# Patient Record
Sex: MALE | Race: WHITE | NOT HISPANIC OR LATINO | Employment: OTHER | URBAN - METROPOLITAN AREA
[De-identification: names, ages, dates, MRNs, and addresses within clinical notes are randomized per-mention and may not be internally consistent; named-entity substitution may affect disease eponyms.]

---

## 2017-02-22 ENCOUNTER — GENERIC CONVERSION - ENCOUNTER (OUTPATIENT)
Dept: OTHER | Facility: OTHER | Age: 67
End: 2017-02-22

## 2017-03-28 ENCOUNTER — HOSPITAL ENCOUNTER (OUTPATIENT)
Dept: RADIOLOGY | Facility: CLINIC | Age: 67
Discharge: HOME | End: 2017-03-28
Payer: COMMERCIAL

## 2017-03-28 ENCOUNTER — TRANSCRIBE ORDERS (OUTPATIENT)
Dept: RADIOLOGY | Facility: CLINIC | Age: 67
End: 2017-03-28

## 2017-03-28 DIAGNOSIS — C43.9 MALIGNANT MELANOMA OF SKIN (HCC): ICD-10-CM

## 2017-03-28 PROCEDURE — 71020 HB CHEST X-RAY 2VW FRONTAL&LATL: CPT

## 2017-04-06 ENCOUNTER — ALLSCRIPTS OFFICE VISIT (OUTPATIENT)
Dept: OTHER | Facility: OTHER | Age: 67
End: 2017-04-06

## 2017-08-02 ENCOUNTER — GENERIC CONVERSION - ENCOUNTER (OUTPATIENT)
Dept: OTHER | Facility: OTHER | Age: 67
End: 2017-08-02

## 2017-08-10 LAB
A/G RATIO (HISTORICAL): 1.7 (ref 1.2–2.2)
ALBUMIN SERPL BCP-MCNC: 4.2 G/DL (ref 3.6–4.8)
ALP SERPL-CCNC: 67 IU/L (ref 39–117)
ALT SERPL W P-5'-P-CCNC: 25 IU/L (ref 0–44)
AST SERPL W P-5'-P-CCNC: 22 IU/L (ref 0–40)
BILIRUB SERPL-MCNC: 0.8 MG/DL (ref 0–1.2)
BUN SERPL-MCNC: 20 MG/DL (ref 8–27)
BUN/CREA RATIO (HISTORICAL): 21 (ref 10–24)
CALCIUM SERPL-MCNC: 9.2 MG/DL (ref 8.6–10.2)
CHLORIDE SERPL-SCNC: 100 MMOL/L (ref 96–106)
CHOLEST SERPL-MCNC: 126 MG/DL (ref 100–199)
CO2 SERPL-SCNC: 24 MMOL/L (ref 18–29)
CREAT SERPL-MCNC: 0.97 MG/DL (ref 0.76–1.27)
EGFR AFRICAN AMERICAN (HISTORICAL): 92 ML/MIN/1.73
EGFR-AMERICAN CALC (HISTORICAL): 79 ML/MIN/1.73
GLUCOSE SERPL-MCNC: 89 MG/DL (ref 65–99)
HDLC SERPL-MCNC: 39 MG/DL
LDLC SERPL CALC-MCNC: 54 MG/DL (ref 0–99)
POTASSIUM SERPL-SCNC: 4.5 MMOL/L (ref 3.5–5.2)
PROSTATE SPECIFIC ANTIGEN (HISTORICAL): 1.9 NG/ML (ref 0–4)
SODIUM SERPL-SCNC: 140 MMOL/L (ref 134–144)
TOT. GLOBULIN, SERUM (HISTORICAL): 2.5 G/DL (ref 1.5–4.5)
TOTAL PROTEIN (HISTORICAL): 6.7 G/DL (ref 6–8.5)
TRIGL SERPL-MCNC: 163 MG/DL (ref 0–149)

## 2017-08-11 ENCOUNTER — GENERIC CONVERSION - ENCOUNTER (OUTPATIENT)
Dept: OTHER | Facility: OTHER | Age: 67
End: 2017-08-11

## 2017-08-16 ENCOUNTER — ALLSCRIPTS OFFICE VISIT (OUTPATIENT)
Dept: OTHER | Facility: OTHER | Age: 67
End: 2017-08-16

## 2017-09-06 ENCOUNTER — ALLSCRIPTS OFFICE VISIT (OUTPATIENT)
Dept: OTHER | Facility: OTHER | Age: 67
End: 2017-09-06

## 2017-10-13 NOTE — H&P
Assessment  1  Basal cell carcinoma of skin of face (173 31) (C44 310)     Discussion/Summary  Discussion Summary:   Please see HPI  We discussed excision of the basal cell carcinoma, frozen section, and reconstruction  We discussed the procedure, how it is performed, as well as potential risks, complications and limitations  His questions have been answered to his satisfaction  Consent obtained, and we will work on scheduling a date for the procedure  The final pathology will need to be forwarded to Dr Esteban Ramos  Counseling Documentation With Imm: The patient was counseled regarding diagnostic results,-prognosis,-patient and family education,-impressions,-risks and benefits of treatment options  total time of encounter was 25 [id number] minutes was spent counseling  Chief Complaint  Chief Complaint Free Text Note Form: Basal cell carcinoma left cheek      History of Present Illness  HPI: Alyssa Blair has been referred back by Dr Esteban Ramos, recent left cheek biopsy revealed basal cell carcinoma  Review of Systems  Complete-Male:   Constitutional: no fever-and-no chills  Eyes: eyesight problems-and-Wears reading glasses  ENT: no hearing loss  Cardiovascular: no chest pain-and-no extremity edema  Respiratory: no shortness of breath  Gastrointestinal: no nausea,-no vomiting,-no constipation,-no diarrhea-and-no blood in stools  Genitourinary: no dysuria-and-no incontinence  Musculoskeletal: no limb swelling  Integumentary: no rashes-and-no skin wound  Neurological: no headache,-no confusion-and-no convulsions  Psychiatric: no anxiety-and-no depression  Endocrine: no proptosis  Hematologic/Lymphatic: no tendency for easy bleeding-and-no tendency for easy bruising  Active Problems  1  Acute UTI (599 0) (N39 0)   2  Afib (427 31) (I48 91)   3  Benign neoplasm of skin of right lower extremity (216 7) (D23 71)   4  Encounter for prostate cancer screening (V76 44) (Z12 5)   5   History of malignant melanoma of skin (V10 82) (Z85 820)   6  Hypertension (401 9) (I10)   7  Insomnia (780 52) (G47 00)   8  Malignant melanoma of skin (172 9) (C43 9)   9  Mixed hyperlipidemia (272 2) (E78 2)   10  Subcutaneous mass (782 2) (R22 9)     Past Medical History  1  History of Cataract of right eye (366 9) (H26 9)   2  History of colonic polyps (V12 72) (Z86 010)   3  History of hypertension (V12 59) (Z86 79)   4  History of malignant melanoma of skin (V10 82) (Z85 820)   5  History of mitral valve disorder (V12 59) (Z86 79)   6  History of retinal detachment (V12 49) (Z86 69)   7  History of Reported A Previous Heart Murmur     Surgical History  1  History of Appendectomy   2  History of Biopsy Lymph Node   3  History of Cataract Extraction   4  History of Dermal Autograft Scalp   5  History of Excision Of Lesion Scalp Malignant Over 4cm   6  History of Mitral Valve Repair   7  History of Repair Of Retinal Detachment   8  History of Tonsillectomy  Surgical History Reviewed: The surgical history was reviewed and updated today  Family History  Mother    1  Family history of Basal Cell Carcinoma Of The Skin   2  Family history of Benign Polyps Of The Large Intestine (V18 51)   3  Family history of Colon Cancer (V16 0)   4  Family history of Hypertension (V17 49)  Family History    5  Family history of Colon carcinoma  Family History Reviewed: The family history was reviewed and updated today  Social History   · Being A Social Drinker   · Marital History - Currently    · Never A Smoker   · Retired From Work   · Denied: History of Tobacco Use  Social History Reviewed: The social history was reviewed and updated today  The social history was reviewed and is unchanged  Current Meds   1  Aspirin  MG Oral Tablet Delayed Release; Therapy: (Recorded:18Jun2012) to Recorded   2  Atenolol 50 MG Oral Tablet; Therapy: [id number] to Recorded   3   Chlorthalidone 25 MG Oral Tablet; TAKE 1/2 TABLET DAILY; Therapy: 85DXB4347 to Recorded   4  Multivital Platinum Silver CHEW;   Therapy: [id number] to (Last Rx:12Mar2012)  Requested for: 12Mar2012 Ordered   5  Pravastatin Sodium 40 MG Oral Tablet; Take 1 tablet daily; Therapy: 96GFF1447 to  Requested for: 16Aug2017 Recorded   6  Zolpidem Tartrate 5 MG Oral Tablet; TAKE 1 TABLET AT BEDTIME AS NEEDED FOR   SLEEP; Therapy: 11Aug2016 to (Evaluate:03Pnr7187); Last Rx:[id number] Ordered  Medication List Reviewed: The medication list was reviewed and updated today  Allergies  1  No Known Drug Allergies     Vitals  Vital Signs     Recorded: 08NGL3264 10:16AM   BP Comments unobtainable   Height 5 ft 11 5 in   Weight 211 lb 6 oz   BMI Calculated 29 07   BSA Calculated 2 17      Physical Exam     Constitutional   General appearance: No acute distress, well appearing and well nourished  Eyes   Conjunctiva and lids: No swelling, erythema, or discharge  Pupils and irises: Equal, round and reactive to light  Ears, Nose, Mouth, and Throat   External inspection of ears and nose: Normal     Pulmonary   Respiratory effort: No increased work of breathing or signs of respiratory distress  Auscultation of lungs: Clear to auscultation, equal breath sounds bilaterally, no wheezes, no rales, no rhonci  Cardiovascular   Palpation of heart: Normal PMI, no thrills  Auscultation of heart: Normal rate and rhythm, normal S1 and S2, without murmurs  Musculoskeletal   Gait and station: Normal     Skin   Skin and subcutaneous tissue: Abnormal  -Ulcerated lesion left cheek, just lateral to the midportion nasolabial fold, consistent with basal cell carcinoma     Psychiatric   Orientation to person, place and time: Normal     Mood and affect: Normal

## 2017-10-17 ENCOUNTER — HOSPITAL ENCOUNTER (OUTPATIENT)
Dept: NON INVASIVE DIAGNOSTICS | Facility: HOSPITAL | Age: 67
Discharge: HOME | End: 2017-10-17
Attending: SURGERY
Payer: COMMERCIAL

## 2017-10-17 ENCOUNTER — TRANSCRIBE ORDERS (OUTPATIENT)
Dept: ADMINISTRATIVE | Facility: HOSPITAL | Age: 67
End: 2017-10-17

## 2017-10-17 DIAGNOSIS — I10 ESSENTIAL HYPERTENSION, MALIGNANT: Primary | ICD-10-CM

## 2017-10-17 DIAGNOSIS — I10 ESSENTIAL HYPERTENSION, MALIGNANT: ICD-10-CM

## 2017-10-17 LAB
ATRIAL RATE: 61 BPM
P AXIS: 63 DEGREES
PR INTERVAL: 164 MS
QRS AXIS: -19 DEGREES
QRSD INTERVAL: 90 MS
QT INTERVAL: 418 MS
QTC INTERVAL: 420 MS
T WAVE AXIS: 36 DEGREES
VENTRICULAR RATE: 61 BPM

## 2017-10-17 PROCEDURE — 93005 ELECTROCARDIOGRAM TRACING: CPT

## 2017-10-31 RX ORDER — HYDROCHLOROTHIAZIDE 12.5 MG/1
12.5 TABLET ORAL DAILY
COMMUNITY
End: 2020-12-03 | Stop reason: SDUPTHER

## 2017-10-31 RX ORDER — ATENOLOL 50 MG/1
50 TABLET ORAL DAILY
Status: ON HOLD | COMMUNITY
End: 2017-11-02 | Stop reason: ALTCHOICE

## 2017-10-31 NOTE — PRE-PROCEDURE INSTRUCTIONS
Pre-Surgery Instructions:   Medication Instructions    aspirin (ECOTRIN) 325 mg EC tablet Patient was instructed by Physician and understands   atenolol (TENORMIN) 50 mg tablet Patient was instructed by Physician and understands   hydrochlorothiazide (HYDRODIURIL) 12 5 mg tablet Patient was instructed by Physician and understands   Multiple Vitamin (MULTIVITAMIN) tablet Patient was instructed by Physician and understands   Multiple Vitamins-Minerals (OCUVITE PO) Patient was instructed by Physician and understands   Omega-3 Fatty Acids (FISH OIL) 1,000 mg Patient was instructed by Physician and understands   pravastatin (PRAVACHOL) 40 mg tablet Patient was instructed by Physician and understands   zolpidem (AMBIEN) 5 mg tablet Patient was instructed by Physician and understands  Before your operation, you play an important role in decreasing your risk for infection by washing with special antiseptic soap  This is an effective way to reduce bacteria on the skin which may help to prevent infections at the surgical site  Please read the following directions in advance  1  In the week before your operation purchase a 4 ounce bottle of antiseptic soap containing chlorhexidine gluconate 4%  Some brand names include: Aplicare, Endure, and Hibiclens  The cost is usually less than $5 00  · For your convenience, the [address] [place] [address] carries the soap  · It may also be available at your doctor's office or pre-admission testing center, and at most retail pharmacies  · If you are allergic or sensitive to soaps containing chlorhexidine gluconate (CHG), please let your doctor know so another antiseptic soap can be suggested  · CHG antiseptic soap is for external use only  2      The day before your operation follow these directions carefully to get ready  · Place clean lines (sheets) on your bed; you should sleep on clean sheets after your evening shower    · Get clean towels and washcloths ready - you need enough for 2 showers  · Set aside clean underwear, pajamas, and clothing to wear after the shower  Reminders:  · DO NOT use any other soap or body rinse on your skin during or after the antiseptic showers  · DO NOT use lotion , powder, deodorant, or perfume/aftershave of any kind on your skin after your antiseptic shower  · DO NOT shave any body parts in the 24 hours/the day before your operation  · DO NOT get the antiseptic soap in your eyes, ears, nose, mouth, or vaginal area  3      You will need to shower the night before AND the morning of your Surgery  Shower 1:  · The evening before your operation, take the fist shower  · First, shampoo your hair with regular shampoo and rinse it completely before you use the anitseptic soap  After washing and rinsing your hair, rinse your body  · Next, use a clean wash cloth to apply the antiseptic soap and wash your body from the neck down to your toes using 1/2 bottle of the antiseptic soap  You will use the other 1/2 bottle for the second shower  · Clean the area where your incision will be; later this area well for about 2 minutes  · If you ar having head or neck surgery, wash areas with the antiseptic soap  · Rinse yourself completely with running water  · Use a clean towel to dry off  · Wear clean underwear and clothing/pajamas  Shower 2:  · The Morning of your operation, take the second shower following the same steps as Shower 1 using the second 1/2 of the bottle of antiseptic soap  · Use clean cloths and towels to was and dry yourself off  · Wear clean underwear and clothing

## 2017-11-02 ENCOUNTER — ANESTHESIA EVENT (OUTPATIENT)
Dept: PERIOP | Facility: HOSPITAL | Age: 67
End: 2017-11-02
Payer: COMMERCIAL

## 2017-11-02 ENCOUNTER — ANESTHESIA (OUTPATIENT)
Dept: PERIOP | Facility: HOSPITAL | Age: 67
End: 2017-11-02
Payer: COMMERCIAL

## 2017-11-02 ENCOUNTER — HOSPITAL ENCOUNTER (OUTPATIENT)
Facility: HOSPITAL | Age: 67
Setting detail: OUTPATIENT SURGERY
Discharge: HOME | End: 2017-11-02
Attending: SURGERY | Admitting: SURGERY
Payer: COMMERCIAL

## 2017-11-02 VITALS
TEMPERATURE: 98.6 F | SYSTOLIC BLOOD PRESSURE: 121 MMHG | OXYGEN SATURATION: 94 % | HEIGHT: 73 IN | RESPIRATION RATE: 16 BRPM | DIASTOLIC BLOOD PRESSURE: 72 MMHG | BODY MASS INDEX: 27.17 KG/M2 | HEART RATE: 60 BPM | WEIGHT: 205 LBS

## 2017-11-02 DIAGNOSIS — C44.310 BASAL CELL CARCINOMA OF SKIN OF FACE: ICD-10-CM

## 2017-11-02 PROCEDURE — 88331 PATH CONSLTJ SURG 1 BLK 1SPC: CPT | Performed by: SURGERY

## 2017-11-02 PROCEDURE — 88305 TISSUE EXAM BY PATHOLOGIST: CPT | Performed by: SURGERY

## 2017-11-02 PROCEDURE — 88332 PATH CONSLTJ SURG EA ADD BLK: CPT | Performed by: SURGERY

## 2017-11-02 RX ORDER — METOCLOPRAMIDE HYDROCHLORIDE 5 MG/ML
10 INJECTION INTRAMUSCULAR; INTRAVENOUS ONCE AS NEEDED
Status: DISCONTINUED | OUTPATIENT
Start: 2017-11-02 | End: 2017-11-02 | Stop reason: HOSPADM

## 2017-11-02 RX ORDER — SODIUM CHLORIDE, SODIUM LACTATE, POTASSIUM CHLORIDE, CALCIUM CHLORIDE 600; 310; 30; 20 MG/100ML; MG/100ML; MG/100ML; MG/100ML
100 INJECTION, SOLUTION INTRAVENOUS CONTINUOUS
Status: DISCONTINUED | OUTPATIENT
Start: 2017-11-02 | End: 2017-11-02 | Stop reason: HOSPADM

## 2017-11-02 RX ORDER — ONDANSETRON 2 MG/ML
INJECTION INTRAMUSCULAR; INTRAVENOUS AS NEEDED
Status: DISCONTINUED | OUTPATIENT
Start: 2017-11-02 | End: 2017-11-02 | Stop reason: SURG

## 2017-11-02 RX ORDER — MIDAZOLAM HYDROCHLORIDE 1 MG/ML
INJECTION INTRAMUSCULAR; INTRAVENOUS AS NEEDED
Status: DISCONTINUED | OUTPATIENT
Start: 2017-11-02 | End: 2017-11-02 | Stop reason: SURG

## 2017-11-02 RX ORDER — PROPOFOL 10 MG/ML
INJECTION, EMULSION INTRAVENOUS AS NEEDED
Status: DISCONTINUED | OUTPATIENT
Start: 2017-11-02 | End: 2017-11-02 | Stop reason: SURG

## 2017-11-02 RX ORDER — FENTANYL CITRATE 50 UG/ML
INJECTION, SOLUTION INTRAMUSCULAR; INTRAVENOUS AS NEEDED
Status: DISCONTINUED | OUTPATIENT
Start: 2017-11-02 | End: 2017-11-02 | Stop reason: SURG

## 2017-11-02 RX ORDER — FENTANYL CITRATE/PF 50 MCG/ML
50 SYRINGE (ML) INJECTION
Status: DISCONTINUED | OUTPATIENT
Start: 2017-11-02 | End: 2017-11-02 | Stop reason: HOSPADM

## 2017-11-02 RX ORDER — EPHEDRINE SULFATE 50 MG/ML
INJECTION, SOLUTION INTRAVENOUS AS NEEDED
Status: DISCONTINUED | OUTPATIENT
Start: 2017-11-02 | End: 2017-11-02 | Stop reason: SURG

## 2017-11-02 RX ORDER — SCOLOPAMINE TRANSDERMAL SYSTEM 1 MG/1
1 PATCH, EXTENDED RELEASE TRANSDERMAL
Status: DISCONTINUED | OUTPATIENT
Start: 2017-11-02 | End: 2017-11-02

## 2017-11-02 RX ORDER — METOPROLOL TARTRATE 50 MG/1
25 TABLET, FILM COATED ORAL DAILY
COMMUNITY
End: 2019-08-20

## 2017-11-02 RX ADMIN — SODIUM CHLORIDE, SODIUM LACTATE, POTASSIUM CHLORIDE, AND CALCIUM CHLORIDE 100 ML/HR: .6; .31; .03; .02 INJECTION, SOLUTION INTRAVENOUS at 11:17

## 2017-11-02 RX ADMIN — MIDAZOLAM HYDROCHLORIDE 2 MG: 1 INJECTION, SOLUTION INTRAMUSCULAR; INTRAVENOUS at 12:27

## 2017-11-02 RX ADMIN — EPHEDRINE SULFATE 10 MG: 50 INJECTION, SOLUTION INTRAMUSCULAR; INTRAVENOUS; SUBCUTANEOUS at 13:13

## 2017-11-02 RX ADMIN — FENTANYL CITRATE 25 MCG: 50 INJECTION, SOLUTION INTRAMUSCULAR; INTRAVENOUS at 12:29

## 2017-11-02 RX ADMIN — EPHEDRINE SULFATE 10 MG: 50 INJECTION, SOLUTION INTRAMUSCULAR; INTRAVENOUS; SUBCUTANEOUS at 12:59

## 2017-11-02 RX ADMIN — CEFAZOLIN SODIUM 2000 MG: 2 SOLUTION INTRAVENOUS at 12:32

## 2017-11-02 RX ADMIN — PROPOFOL 200 MG: 10 INJECTION, EMULSION INTRAVENOUS at 12:29

## 2017-11-02 RX ADMIN — ONDANSETRON 4 MG: 2 INJECTION INTRAMUSCULAR; INTRAVENOUS at 13:04

## 2017-11-02 NOTE — ANESTHESIA PREPROCEDURE EVALUATION
Review of Systems/Medical History  Patient summary reviewed  Chart reviewed  History of anesthetic complications PONV    Cardiovascular  EKG reviewed, Exercise tolerance: good,  Hyperlipidemia, Hypertension controlled, Dysrhythmias, atrial fibrillation,    Pulmonary  Negative pulmonary ROS ,        GI/Hepatic  Negative GI/hepatic ROS          Negative  ROS        Endo/Other  Negative endo/other ROS      GYN       Hematology  Negative hematology ROS      Musculoskeletal  Negative musculoskeletal ROS        Neurology  Negative neurology ROS      Psychology   Negative psychology ROS            Physical Exam    Airway    Mallampati score: III  TM Distance: <3 FB  Neck ROM: limited     Dental   No notable dental hx     Cardiovascular  Cardiovascular exam normal    Pulmonary  Pulmonary exam normal     Other Findings        Anesthesia Plan  ASA Score- 2       Anesthesia Type- general with ASA Monitors  Additional Monitors:   Airway Plan: LMA  Induction- intravenous  Informed Consent- Anesthetic plan and risks discussed with patient

## 2017-11-02 NOTE — INTERIM OP NOTE
CHEEK BASAL CELL CARCINOMA EXCISION; RECONSTRUCTION;  FROZEN SECTION  Postoperative Note  PATIENT NAME: Cale Castañeda  : 1950  MRN: [id number]  [facility]    Surgery Date: 2017    Preop Diagnosis:  Basal cell carcinoma of skin of face [C44 310]    Post-Op Diagnosis Codes:      * Basal cell carcinoma of skin of face [C44 310]    Procedure(s) (LRB):  CHEEK BASAL CELL CARCINOMA EXCISION; RECONSTRUCTION;  FROZEN SECTION (Left)    Surgeon(s) and Role:     * Alec Larsen MD - Primary     * Elaine Stout PA-C - Assisting    Specimens:  ID Type Source Tests Collected by Time Destination   1 : 1200 suture long superior margin; 6:00 short inferior margin Tissue Cheek TISSUE Esperanza Joe MD 2017 1250        Estimated Blood Loss:   Minimal    Anesthesia Type:   Choice     Findings:    None   Complications:   None    SIGNATURE: Elaine Stout PA-C   DATE: 2017   TIME: 1:29 PM

## 2017-11-02 NOTE — DISCHARGE INSTRUCTIONS
[facility]  Dr Randhawa [address], [address]  Phone: [phone]     Postoperative Instructions for Outpatient Surgery     These instructions are being provided by your doctor to give you basic guidelines during your post-op recovery  Please let our office know if your contact information has changed       Please call the office today for an appointment in about 7-10 days      Dressings: Steri strips, replace if they fall off       Activity Restrictions: Nothing strenuous for 24-48 hrs       Bathing:  May shower tomorrow afternoon and pat dressing dry       Medications:    Resume pre-op medications  You may take tylenol, aleve, or ibuprofen for pain control                 Other: Elevate head of bed

## 2017-11-02 NOTE — OP NOTE
OPERATIVE REPORT  PATIENT NAME: Michaela Mccullough    :  1950  MRN: [id number]  Pt Location: [facility]    SURGERY DATE: 2017    Surgeon(s) and Role:     * Clement Dey MD - Primary     * Jen Ngo PA-C - Assisting    Preop Diagnosis:  Basal cell carcinoma of skin of face [C44 310]    Post-Op Diagnosis Codes: * Basal cell carcinoma of skin of face [C44 310]     Procedure 1  Excision basal cell carcinoma left cheek 1 2 cm excised diameter  Frozen section  2   Reconstruction left cheek defect with adjacent tissue transfer 4 0 by 2 8 cm  Specimen(s):  ID Type Source Tests Collected by Time Destination   1 : 1200 suture long superior margin; 6:00 short inferior margin Tissue Cheek TISSUE EXAM Clement Dey MD 2017 1250        Estimated Blood Loss:   Minimal    Drains:       Anesthesia Type:   Choice    Operative Indications:  Basal cell carcinoma of skin of face [C44 310]      Operative Findings:  As above    Complications:   None    Procedure and Technique:  The patient was seen in the holding area, the surgical site was marked with his participation, and we reviewed the plan  He was taken to the operating room and underwent induction of general anesthesia by the anesthesia personnel  The operative field was prepped and draped in sterile fashion and a proper time-out was performed  2 5 magnification was used aid visualization  The area was infiltrated with xylocaine with epinephrine, 15 blade used to create skin incision this carried down through the dermis and lesion was excised deep in the plane of subcutaneous fat with curved iris scissors  It was marked with suture and sent to pathology  Pathologist later called back to the room that the margins were negative  The reconstruction was then planned with advancement of flaps medial and lateral to the defect  Accessory triangles were excised superior and inferior to the defect utilizing a 15 blade    The flaps were then undermined deep to the dermis with approximately 1 5 cm medial and 1 5 cm laterally  Hemostasis was assured the Bovie cautery  Flaps were then advanced to fill the defect and closure was undertaken with 5 O Monocryl sutures  At the level of the deep dermis, this was followed by running subcuticular 5 O Monocryl  Benzoin and Steri-Strips were applied and patient was transferred to the recovery room stable condition     I was present for the entire procedure and A qualified resident physician was not available    Patient Disposition:  PACU     SIGNATURE: Abigail Fernando MD  DATE: November 2, 2017  TIME: 3:05 PM

## 2017-11-13 ENCOUNTER — ALLSCRIPTS OFFICE VISIT (OUTPATIENT)
Dept: OTHER | Facility: OTHER | Age: 67
End: 2017-11-13

## 2018-01-10 NOTE — RESULT NOTES
Verified Results  (1) COMPREHENSIVE METABOLIC PANEL [id number] 01:43CP [employer] [facility] [employer]     Test Name Result Flag Reference   Glucose, Serum 89 mg/dL  65-99   BUN 20 mg/dL  8-27   Creatinine, Serum 0 97 mg/dL  0 76-1 27   BUN/Creatinine Ratio 21  10-24   Sodium, Serum 140 mmol/L  134-144   Potassium, Serum 4 5 mmol/L  3 5-5 2   Chloride, Serum 100 mmol/L     Carbon Dioxide, Total 24 mmol/L  18-29   Calcium, Serum 9 2 mg/dL  8 6-10 2   Protein, Total, Serum 6 7 g/dL  6 0-8 5   Albumin, Serum 4 2 g/dL  3 6-4 8   Globulin, Total 2 5 g/dL  1 5-4 5   A/G Ratio 1 7  1 2-2 2   Bilirubin, Total 0 8 mg/dL  0 0-1 2   Alkaline Phosphatase, S 67 IU/L     AST (SGOT) 22 IU/L  0-40   ALT (SGPT) 25 IU/L  0-44   eGFR If NonAfricn Am 79 mL/min/1 73  >59   eGFR If Africn Am 92 mL/min/1 73  >59   Glucose, Serum 89 mg/dL  65-99   BUN 20 mg/dL  8-27   Creatinine, Serum 0 97 mg/dL  0 76-1 27   BUN/Creatinine Ratio 21  10-24   Sodium, Serum 140 mmol/L  134-144   Potassium, Serum 4 5 mmol/L  3 5-5 2   Chloride, Serum 100 mmol/L     Carbon Dioxide, Total 24 mmol/L  18-29   Calcium, Serum 9 2 mg/dL  8 6-10 2   Protein, Total, Serum 6 7 g/dL  6 0-8 5   Albumin, Serum 4 2 g/dL  3 6-4 8   Globulin, Total 2 5 g/dL  1 5-4 5   A/G Ratio 1 7  1 2-2 2   Bilirubin, Total 0 8 mg/dL  0 0-1 2   Alkaline Phosphatase, S 67 IU/L     AST (SGOT) 22 IU/L  0-40   ALT (SGPT) 25 IU/L  0-44   eGFR If NonAfricn Am 79 mL/min/1 73  >59   eGFR If Africn Am 92 mL/min/1 73  >59           (1) LIPID PANEL FASTING W DIRECT LDL REFLEX [id number] 06:46AM [employer] [facility] [employer]     Test Name Result Flag Reference   Cholesterol, Total 126 mg/dL  100-199   Triglycerides 163 mg/dL H 0-149   HDL Cholesterol 39 mg/dL L >39   LDL Cholesterol Calc 54 mg/dL  0-99   Cholesterol, Total 126 mg/dL  100-199   Triglycerides 163 mg/dL H 0-149   HDL Cholesterol 39 mg/dL L >39   LDL Cholesterol Calc 54 mg/dL  0-99           (1) PSA (SCREEN) (Dx V76 44 Screen for Prostate Cancer) [id number] 06:46AM [facility]     Test Name Result Flag Reference   Prostate Specific Ag, Serum 1 9 ng/mL  0 0-4 0   Roche ECLIA methodology  According to the American Urological Association, Serum PSA should  decrease and remain at undetectable levels after radical  prostatectomy  The AUA defines biochemical recurrence as an initial  PSA value 0 2 ng/mL or greater followed by a subsequent confirmatory  PSA value 0 2 ng/mL or greater  Values obtained with different assay methods or kits cannot be used  interchangeably  Results cannot be interpreted as absolute evidence  of the presence or absence of malignant disease

## 2018-01-10 NOTE — RESULT NOTES
Verified Results  (1) PSA (SCREEN) (Dx V76 44 Screen for Prostate Cancer) [id number] 07:14AM [facility] [employer]     Test Name Result Flag Reference   Prostate Specific Ag, Serum 2 0 ng/mL  0 0-4 0   Roche ECLIA methodology  According to the American Urological Association, Serum PSA should  decrease and remain at undetectable levels after radical  prostatectomy  The AUA defines biochemical recurrence as an initial  PSA value 0 2 ng/mL or greater followed by a subsequent confirmatory  PSA value 0 2 ng/mL or greater  Values obtained with different assay methods or kits cannot be used  interchangeably  Results cannot be interpreted as absolute evidence  of the presence or absence of malignant disease  (1) LIPID PANEL FASTING W DIRECT LDL REFLEX [id number] 07:14AM [employer]     Test Name Result Flag Reference   Cholesterol, Total 136 mg/dL  100-199   Triglycerides 138 mg/dL  0-149   HDL Cholesterol 42 mg/dL  >39   According to ATP-III Guidelines, HDL-C >59 mg/dL is considered a  negative risk factor for CHD     LDL Cholesterol Calc 66 mg/dL  0-99     (1) COMPREHENSIVE METABOLIC PANEL [id number] 24:63EP [employer]     Test Name Result Flag Reference   Glucose, Serum 91 mg/dL  65-99   BUN 25 mg/dL  8-27   Creatinine, Serum 1 15 mg/dL  0 76-1 27   eGFR If NonAfricn Am 65 mL/min/1 73  >59   eGFR If Africn Am 76 mL/min/1 73  >59   BUN/Creatinine Ratio 22  10-22   Sodium, Serum 143 mmol/L  134-144   Potassium, Serum 4 1 mmol/L  3 5-5 2   Chloride, Serum 98 mmol/L     Carbon Dioxide, Total 26 mmol/L  18-29   Calcium, Serum 9 8 mg/dL  8 6-10 2   Protein, Total, Serum 6 9 g/dL  6 0-8 5   Albumin, Serum 4 3 g/dL  3 6-4 8   Globulin, Total 2 6 g/dL  1 5-4 5   A/G Ratio 1 7  1 1-2 5   Bilirubin, Total 0 4 mg/dL  0 0-1 2   Alkaline Phosphatase, S 75 IU/L     AST (SGOT) 22 IU/L  0-40   ALT (SGPT) 19 IU/L  0-44

## 2018-01-11 NOTE — PROGRESS NOTES
Active Problems    1  Acute UTI (599 0) (N39 0)   2  Afib (427 31) (I48 91)   3  Basal cell carcinoma of skin of face (173 31) (C44 310)   4  Benign neoplasm of skin of right lower extremity (216 7) (D23 71)   5  Encounter for prostate cancer screening (V76 44) (Z12 5)   6  History of malignant melanoma of skin (V10 82) (Z85 820)   7  Hypertension (401 9) (I10)   8  Insomnia (780 52) (G47 00)   9  Malignant melanoma of skin (172 9) (C43 9)   10  Mixed hyperlipidemia (272 2) (E78 2)   11  Subcutaneous mass (782 2) (R22 9)    Current Meds   1  Aspirin  MG Oral Tablet Delayed Release; Therapy: (Recorded:18Jun2012) to Recorded   2  Atenolol 50 MG Oral Tablet; Therapy: [id number] to Recorded   3  Chlorthalidone 25 MG Oral Tablet; TAKE 1/2 TABLET DAILY; Therapy: 62DRG6130 to Recorded   4  Multivital Platinum Silver CHEW;   Therapy: [id number] to (Last Rx:12Mar2012)  Requested for: 12Mar2012 Ordered   5  Pravastatin Sodium 40 MG Oral Tablet; Take 1 tablet daily; Therapy: 53TYL7122 to  Requested for: [id number] Recorded   6  Zolpidem Tartrate 5 MG Oral Tablet; TAKE 1 TABLET AT BEDTIME AS NEEDED FOR   SLEEP; Therapy: 11Aug2016 to (Evaluate:98Koi2472); Last Rx:[id number] Ordered    Allergies    1  No Known Drug Allergies    Procedure  Procedure: suture removal  The wound was located on the left cheek  Wound Exam: well healed with no sign of infection  Procedure Note: sutures were removed  Dressing: an antibiotic ointment was applied  Patient Status:  the patient tolerated the procedure well  Complications:  there were no complications  Discussion/Summary  Patient presents for suture removal s/p left cheeks BCC excision 11/2/17  Incision well healed  Post op scar care instructions reviewed and given  Reviewed with patient that pathology showed all margins were negative  Patient verbalized understanding of all and will follow up in 3 months with Dr Tatianna Heck as needed          Future Appointments    Date/Time Provider Specialty Site   04/09/2018 09:00 AM MANISH Lebron   Medical [facility]     Signatures   Electronically signed by : Alan Zelaya RN; Nov 13 2017  2:12PM EST                       (Author)    Electronically signed by : MANISH Griffin ; Nov 28 2017 12:58PM EST

## 2018-01-13 VITALS
DIASTOLIC BLOOD PRESSURE: 78 MMHG | HEIGHT: 72 IN | RESPIRATION RATE: 16 BRPM | TEMPERATURE: 96.5 F | BODY MASS INDEX: 27.5 KG/M2 | OXYGEN SATURATION: 98 % | HEART RATE: 66 BPM | WEIGHT: 203 LBS | SYSTOLIC BLOOD PRESSURE: 126 MMHG

## 2018-01-14 VITALS
SYSTOLIC BLOOD PRESSURE: 126 MMHG | WEIGHT: 207 LBS | BODY MASS INDEX: 28.04 KG/M2 | DIASTOLIC BLOOD PRESSURE: 84 MMHG | HEIGHT: 72 IN

## 2018-01-14 VITALS — WEIGHT: 211.38 LBS | HEIGHT: 72 IN | BODY MASS INDEX: 28.63 KG/M2

## 2018-02-13 LAB
ALBUMIN SERPL-MCNC: 4.4 G/DL (ref 3.6–4.8)
ALBUMIN/GLOB SERPL: 1.6 {RATIO} (ref 1.2–2.2)
ALP SERPL-CCNC: 81 IU/L (ref 39–117)
ALT SERPL-CCNC: 23 IU/L (ref 0–44)
AMBIG ABBREV DEFAULT: NORMAL
AST SERPL-CCNC: 24 IU/L (ref 0–40)
BASOPHILS # BLD AUTO: 0 X10E3/UL (ref 0–0.2)
BASOPHILS NFR BLD AUTO: 0 %
BILIRUB SERPL-MCNC: 0.7 MG/DL (ref 0–1.2)
BUN SERPL-MCNC: 23 MG/DL (ref 8–27)
BUN/CREAT SERPL: 21 (ref 10–24)
CALCIUM SERPL-MCNC: 9.3 MG/DL (ref 8.6–10.2)
CHLORIDE SERPL-SCNC: 98 MMOL/L (ref 96–106)
CO2 SERPL-SCNC: 24 MMOL/L (ref 18–29)
CREAT SERPL-MCNC: 1.1 MG/DL (ref 0.76–1.27)
EOSINOPHIL # BLD AUTO: 0.1 X10E3/UL (ref 0–0.4)
EOSINOPHIL NFR BLD AUTO: 2 %
ERYTHROCYTE [DISTWIDTH] IN BLOOD BY AUTOMATED COUNT: 13.6 % (ref 12.3–15.4)
GLOBULIN SER-MCNC: 2.8 G/DL (ref 1.5–4.5)
GLUCOSE SERPL-MCNC: 93 MG/DL (ref 65–99)
HCT VFR BLD AUTO: 45.5 % (ref 37.5–51)
HGB BLD-MCNC: 15.4 G/DL (ref 13–17.7)
IMM GRANULOCYTES # BLD: 0 X10E3/UL (ref 0–0.1)
IMM GRANULOCYTES NFR BLD: 1 %
LDH SERPL-CCNC: 173 IU/L (ref 121–224)
LYMPHOCYTES # BLD AUTO: 2 X10E3/UL (ref 0.7–3.1)
LYMPHOCYTES NFR BLD AUTO: 34 %
MCH RBC QN AUTO: 31.5 PG (ref 26.6–33)
MCHC RBC AUTO-ENTMCNC: 33.8 G/DL (ref 31.5–35.7)
MCV RBC AUTO: 93 FL (ref 79–97)
MONOCYTES # BLD AUTO: 0.3 X10E3/UL (ref 0.1–0.9)
MONOCYTES NFR BLD AUTO: 6 %
NEUTROPHILS # BLD AUTO: 3.4 X10E3/UL (ref 1.4–7)
NEUTROPHILS NFR BLD AUTO: 57 %
PLATELET # BLD AUTO: 251 X10E3/UL (ref 150–379)
POTASSIUM SERPL-SCNC: 4.3 MMOL/L (ref 3.5–5.2)
PROT SERPL-MCNC: 7.2 G/DL (ref 6–8.5)
RBC # BLD AUTO: 4.89 X10E6/UL (ref 4.14–5.8)
SL AMB EGFR AFRICAN AMERICAN: 79 ML/MIN/1.73
SL AMB EGFR NON AFRICAN AMERICAN: 68 ML/MIN/1.73
SODIUM SERPL-SCNC: 139 MMOL/L (ref 134–144)
WBC # BLD AUTO: 5.9 X10E3/UL (ref 3.4–10.8)

## 2018-03-20 ENCOUNTER — APPOINTMENT (OUTPATIENT)
Dept: RADIOLOGY | Facility: CLINIC | Age: 68
End: 2018-03-20
Payer: COMMERCIAL

## 2018-03-20 DIAGNOSIS — C43.9 MALIGNANT MELANOMA OF SKIN (HCC): ICD-10-CM

## 2018-03-20 PROCEDURE — 71046 X-RAY EXAM CHEST 2 VIEWS: CPT

## 2018-04-01 DIAGNOSIS — C43.9 MALIGNANT MELANOMA OF SKIN (HCC): ICD-10-CM

## 2018-04-06 DIAGNOSIS — C43.9 MALIGNANT MELANOMA OF SKIN (HCC): ICD-10-CM

## 2018-04-06 NOTE — PROGRESS NOTES
Hematology/Oncology Outpatient Follow- up Note  Courtney Campos 69 y o  male MRN: @ Encounter: [id number]        Date:  4/6/2018        Assessment / Plan:    1  Malignant melanoma status post wide excision, AJCC stage IIIC ([id number]) who is with no evidence of disease  He had a chest x-ray on 3/20, which was unremarkable  Laboratory values from February were also reviewed  He continues to be disease free  He will follow up with us in one year with a chest x-ray, CBC, CMP, and LDH  He will call us in the interim with any questions  He will continue to follow up with Dr Caroline Sanchez, his next visit with him will be in August         Subjective:   CC: follow up regarding malignant melanoma      HPI:    Mr Dave Esqueda is a 25-year-old white male with a history of malignant melanoma, AJCC stage IIIc, status post wide 2/7/11  See previous history for further details  Interval History:    Overall the patient is doing well  HE has a good energy level with an ECOG performance status of zero  He has a good appetite and his weight has been stable  He did have a basal cell removed in November on his face  He otherwise denies fevers, chills, CP, SOb, N/V, diarrhea, all other ROS are unremarkable  PFSH was reviewed  Cancer Staging:  IIIC, [id number] melanoma of the scalp  BRAF:  Not tested    Previous Hematologic/ Oncologic History: Wide excision with a sentinel lymph node biopsy with Dr Gokul Alfaro 2/7/11  Current Hematologic/ Oncologic Treatment:    observation          Test Results:    Imaging: Xr Chest Pa & Lateral    Result Date: 3/22/2018  Narrative: CHEST INDICATION:   C43 9: Malignant melanoma of skin, unspecified  COMPARISON:  Chest x-ray dated March 28, 2017  EXAM PERFORMED/VIEWS:  XR CHEST PA & LATERAL Images: 4 FINDINGS: The cardiomediastinal silhouette is unchanged from the prior exam and demonstrates postoperative changes of prior mitral valve repair  The lungs are clear    Chronic obstructive airway changes are present  No pneumothorax or pleural effusion  Postoperative changes of prior median sternotomy are again noted  Impression: No acute cardiopulmonary disease  Workstation performed: [id number]       Labs:   Lab Results   Component Value Date    WBC 5 6 03/28/2017    HGB 15 4 02/13/2018    HCT 45 5 02/13/2018    MCV 93 02/13/2018     02/13/2018     Lab Results   Component Value Date     08/10/2017    K 4 5 08/10/2017     08/10/2017    CO2 24 08/10/2017    BUN 23 02/13/2018    CREATININE 1 10 02/13/2018    GLUCOSE 89 08/10/2017    CALCIUM 9 2 08/10/2017    AST 22 08/10/2017    ALT 25 08/10/2017    ALKPHOS 67 08/10/2017    PROT 6 7 08/10/2017    BILITOT 0 8 08/10/2017         No results found for: SPEP, UPEP    Lab Results   Component Value Date    PSA 1 9 08/10/2017    PSA 2 0 08/04/2016       No results found for: CEA    No results found for:     No results found for: AFP    No results found for: IRON, TIBC, FERRITIN    No results found for: [id number]      ROS: Review of Systems   Constitutional: Negative  HENT: Negative  Eyes: Negative  Respiratory: Negative  Cardiovascular: Negative  Gastrointestinal: Negative  Endocrine: Negative  Genitourinary: Negative  Musculoskeletal: Negative  Skin: Negative  Allergic/Immunologic: Negative  Neurological: Negative  Hematological: Negative  Psychiatric/Behavioral: Negative  Current Medications: Reviewed  Allergies: Reviewed  PMH/FH/SH:  Reviewed      Physical Exam:    There is no height or weight on file to calculate BSA      Wt Readings from Last 3 Encounters:   11/02/17 93 kg (205 lb)   09/06/17 95 9 kg (211 lb 6 1 oz)   08/16/17 93 9 kg (207 lb)        Temp Readings from Last 3 Encounters:   11/02/17 98 6 °F (37 °C) (Oral)   04/06/17 (!) 96 5 °F (35 8 °C)   12/15/16 97 8 °F (36 6 °C) (Oral)        BP Readings from Last 3 Encounters:   11/02/17 121/72   08/16/17 126/84   04/06/17 126/78         Pulse Readings from Last 3 Encounters:   11/02/17 60   04/06/17 66   12/15/16 56     @LASTSAO2(3)@      Physical Exam   Constitutional: He is oriented to person, place, and time  He appears well-developed and well-nourished  HENT:   Head: Normocephalic and atraumatic  Mouth/Throat: Oropharynx is clear and moist    Eyes: Conjunctivae and EOM are normal  Pupils are equal, round, and reactive to light  Neck: Normal range of motion  Neck supple  No thyromegaly present  Cardiovascular: Normal rate, regular rhythm and normal heart sounds  Pulmonary/Chest: Effort normal and breath sounds normal    Abdominal: Soft  Bowel sounds are normal  He exhibits no distension and no mass  There is no tenderness  Musculoskeletal: Normal range of motion  He exhibits no edema  Lymphadenopathy:     He has no cervical adenopathy  Neurological: He is alert and oriented to person, place, and time  He has normal reflexes  Skin: Skin is warm and dry  No erythema  Psychiatric: He has a normal mood and affect  Vitals reviewed  Goals and Barriers:  Current Goal: Curative intent  Barriers: None  Patient's Capacity to Self Care:  Patient fully able to self care      Code Status: [unfilled]  Advance Directive and Living Will:      Power of :

## 2018-04-09 ENCOUNTER — OFFICE VISIT (OUTPATIENT)
Dept: HEMATOLOGY ONCOLOGY | Facility: CLINIC | Age: 68
End: 2018-04-09
Payer: COMMERCIAL

## 2018-04-09 VITALS
TEMPERATURE: 97.6 F | OXYGEN SATURATION: 99 % | HEIGHT: 73 IN | WEIGHT: 208.5 LBS | HEART RATE: 82 BPM | BODY MASS INDEX: 27.63 KG/M2 | RESPIRATION RATE: 16 BRPM | DIASTOLIC BLOOD PRESSURE: 80 MMHG | SYSTOLIC BLOOD PRESSURE: 122 MMHG

## 2018-04-09 DIAGNOSIS — C43.4 MALIGNANT MELANOMA OF SCALP (HCC): Primary | ICD-10-CM

## 2018-04-09 PROCEDURE — 99213 OFFICE O/P EST LOW 20 MIN: CPT | Performed by: SPECIALIST

## 2018-04-09 RX ORDER — AMOXICILLIN 500 MG/1
CAPSULE ORAL
COMMUNITY
Start: 2018-04-03 | End: 2020-01-08

## 2018-04-09 NOTE — LETTER
April 9, 2018     Je Henry MD  [address] [address]    Patient: Georgina Lugo   YOB: 1950   Date of Visit: 4/9/2018       Dear Dr Ronda Hines: Thank you for referring Luciana Mariellekita to me for evaluation  Below are my notes for this consultation  If you have questions, please do not hesitate to call me  I look forward to following your patient along with you  Sincerely,        Kianna Forte MD        CC: MD Kianna Levin MD  4/9/2018  9:06 AM  Sign at close encounter  Hematology/Oncology Outpatient Follow- up Note  Georgina Lugo 69 y o  male MRN: @ Encounter: [id number]        Date:  4/6/2018        Assessment / Plan:    1  Malignant melanoma status post wide excision, AJCC stage IIIC ([id number]) who is with no evidence of disease  He had a chest x-ray on 3/20, which was unremarkable  Laboratory values from February were also reviewed  He continues to be disease free  He will follow up with us in one year with a chest x-ray, CBC, CMP, and LDH  He will call us in the interim with any questions  He will continue to follow up with Dr Cottie Riedel, his next visit with him will be in August         Subjective:   CC: follow up regarding malignant melanoma      HPI:    Mr Alaina Diaz is a 68-year-old white male with a history of malignant melanoma, AJCC stage IIIc, status post wide 2/7/11  See previous history for further details  Interval History:    Overall the patient is doing well  HE has a good energy level with an ECOG performance status of zero  He has a good appetite and his weight has been stable  He did have a basal cell removed in November on his face  He otherwise denies fevers, chills, CP, SOb, N/V, diarrhea, all other ROS are unremarkable  PFSH was reviewed  Cancer Staging:  IIIC, [id number] melanoma of the scalp  BRAF:  Not tested    Previous Hematologic/ Oncologic History:     Wide excision with a sentinel lymph node biopsy with Dr Xiao China 2/7/11  Current Hematologic/ Oncologic Treatment:    observation          Test Results:    Imaging: Xr Chest Pa & Lateral    Result Date: 3/22/2018  Narrative: CHEST INDICATION:   C43 9: Malignant melanoma of skin, unspecified  COMPARISON:  Chest x-ray dated March 28, 2017  EXAM PERFORMED/VIEWS:  XR CHEST PA & LATERAL Images: 4 FINDINGS: The cardiomediastinal silhouette is unchanged from the prior exam and demonstrates postoperative changes of prior mitral valve repair  The lungs are clear  Chronic obstructive airway changes are present  No pneumothorax or pleural effusion  Postoperative changes of prior median sternotomy are again noted  Impression: No acute cardiopulmonary disease  Workstation performed: [id number]       Labs:   Lab Results   Component Value Date    WBC 5 6 03/28/2017    HGB 15 4 02/13/2018    HCT 45 5 02/13/2018    MCV 93 02/13/2018     02/13/2018     Lab Results   Component Value Date     08/10/2017    K 4 5 08/10/2017     08/10/2017    CO2 24 08/10/2017    BUN 23 02/13/2018    CREATININE 1 10 02/13/2018    GLUCOSE 89 08/10/2017    CALCIUM 9 2 08/10/2017    AST 22 08/10/2017    ALT 25 08/10/2017    ALKPHOS 67 08/10/2017    PROT 6 7 08/10/2017    BILITOT 0 8 08/10/2017         No results found for: SPEP, UPEP    Lab Results   Component Value Date    PSA 1 9 08/10/2017    PSA 2 0 08/04/2016       No results found for: CEA    No results found for:     No results found for: AFP    No results found for: IRON, TIBC, FERRITIN    No results found for: [id number]      ROS: Review of Systems   Constitutional: Negative  HENT: Negative  Eyes: Negative  Respiratory: Negative  Cardiovascular: Negative  Gastrointestinal: Negative  Endocrine: Negative  Genitourinary: Negative  Musculoskeletal: Negative  Skin: Negative  Allergic/Immunologic: Negative  Neurological: Negative  Hematological: Negative      Psychiatric/Behavioral: Negative  Current Medications: Reviewed  Allergies: Reviewed  PMH/FH/SH:  Reviewed      Physical Exam:    There is no height or weight on file to calculate BSA  Wt Readings from Last 3 Encounters:   11/02/17 93 kg (205 lb)   09/06/17 95 9 kg (211 lb 6 1 oz)   08/16/17 93 9 kg (207 lb)        Temp Readings from Last 3 Encounters:   11/02/17 98 6 °F (37 °C) (Oral)   04/06/17 (!) 96 5 °F (35 8 °C)   12/15/16 97 8 °F (36 6 °C) (Oral)        BP Readings from Last 3 Encounters:   11/02/17 121/72   08/16/17 126/84   04/06/17 126/78         Pulse Readings from Last 3 Encounters:   11/02/17 60   04/06/17 66   12/15/16 56     @LASTSAO2(3)@      Physical Exam   Constitutional: He is oriented to person, place, and time  He appears well-developed and well-nourished  HENT:   Head: Normocephalic and atraumatic  Mouth/Throat: Oropharynx is clear and moist    Eyes: Conjunctivae and EOM are normal  Pupils are equal, round, and reactive to light  Neck: Normal range of motion  Neck supple  No thyromegaly present  Cardiovascular: Normal rate, regular rhythm and normal heart sounds  Pulmonary/Chest: Effort normal and breath sounds normal    Abdominal: Soft  Bowel sounds are normal  He exhibits no distension and no mass  There is no tenderness  Musculoskeletal: Normal range of motion  He exhibits no edema  Lymphadenopathy:     He has no cervical adenopathy  Neurological: He is alert and oriented to person, place, and time  He has normal reflexes  Skin: Skin is warm and dry  No erythema  Psychiatric: He has a normal mood and affect  Vitals reviewed  Goals and Barriers:  Current Goal: Curative intent  Barriers: None  Patient's Capacity to Self Care:  Patient fully able to self care      Code Status: [unfilled]  Advance Directive and Living Will:      Power of :

## 2018-07-18 DIAGNOSIS — E78.2 MIXED HYPERLIPIDEMIA: Primary | ICD-10-CM

## 2018-07-18 DIAGNOSIS — G47.00 INSOMNIA, UNSPECIFIED TYPE: Primary | ICD-10-CM

## 2018-07-18 DIAGNOSIS — Z12.5 SCREENING PSA (PROSTATE SPECIFIC ANTIGEN): ICD-10-CM

## 2018-07-18 RX ORDER — ZOLPIDEM TARTRATE 5 MG/1
5 TABLET ORAL
Qty: 30 TABLET | Refills: 0 | Status: SHIPPED | OUTPATIENT
Start: 2018-07-18 | End: 2018-08-16 | Stop reason: SDUPTHER

## 2018-07-18 NOTE — TELEPHONE ENCOUNTER
MSG LEFT WITH WHOEVER ANSWERED FREDDY PHONE FOR PT TO CB--NEEDS MM/LABS 8/2018--LABS TO BE DONE AFTER 8/10/18 (PSA)--VERIFY INS--BEFORE RX CAN BE FILLED

## 2018-08-14 ENCOUNTER — TELEPHONE (OUTPATIENT)
Dept: FAMILY MEDICINE CLINIC | Facility: CLINIC | Age: 68
End: 2018-08-14

## 2018-08-14 LAB
CHOLEST SERPL-MCNC: 128 MG/DL (ref 100–199)
HDLC SERPL-MCNC: 37 MG/DL
LDLC SERPL CALC-MCNC: 53 MG/DL (ref 0–99)
LDLC/HDLC SERPL: 1.4 RATIO (ref 0–3.6)
PSA FREE MFR SERPL: 14.4 %
PSA FREE SERPL-MCNC: 0.26 NG/ML
PSA SERPL-MCNC: 1.8 NG/ML (ref 0–4)
SL AMB VLDL CHOLESTEROL CALC: 38 MG/DL (ref 5–40)
TRIGL SERPL-MCNC: 189 MG/DL (ref 0–149)

## 2018-08-16 ENCOUNTER — OFFICE VISIT (OUTPATIENT)
Dept: FAMILY MEDICINE CLINIC | Facility: CLINIC | Age: 68
End: 2018-08-16
Payer: COMMERCIAL

## 2018-08-16 VITALS
SYSTOLIC BLOOD PRESSURE: 128 MMHG | BODY MASS INDEX: 26.51 KG/M2 | DIASTOLIC BLOOD PRESSURE: 78 MMHG | HEIGHT: 73 IN | WEIGHT: 200 LBS | RESPIRATION RATE: 16 BRPM | HEART RATE: 76 BPM

## 2018-08-16 DIAGNOSIS — G47.00 INSOMNIA, UNSPECIFIED TYPE: ICD-10-CM

## 2018-08-16 DIAGNOSIS — Z00.00 WELLNESS EXAMINATION: Primary | ICD-10-CM

## 2018-08-16 DIAGNOSIS — E78.01 FAMILIAL HYPERCHOLESTEROLEMIA: ICD-10-CM

## 2018-08-16 DIAGNOSIS — C43.9 MALIGNANT MELANOMA OF SKIN (HCC): ICD-10-CM

## 2018-08-16 DIAGNOSIS — I48.91 ATRIAL FIBRILLATION, UNSPECIFIED TYPE (HCC): ICD-10-CM

## 2018-08-16 PROCEDURE — 99213 OFFICE O/P EST LOW 20 MIN: CPT | Performed by: FAMILY MEDICINE

## 2018-08-16 PROCEDURE — 4040F PNEUMOC VAC/ADMIN/RCVD: CPT | Performed by: FAMILY MEDICINE

## 2018-08-16 PROCEDURE — 99397 PER PM REEVAL EST PAT 65+ YR: CPT | Performed by: FAMILY MEDICINE

## 2018-08-16 PROCEDURE — 1036F TOBACCO NON-USER: CPT | Performed by: FAMILY MEDICINE

## 2018-08-16 PROCEDURE — 3008F BODY MASS INDEX DOCD: CPT | Performed by: FAMILY MEDICINE

## 2018-08-16 PROCEDURE — 1160F RVW MEDS BY RX/DR IN RCRD: CPT | Performed by: FAMILY MEDICINE

## 2018-08-16 RX ORDER — ZOLPIDEM TARTRATE 5 MG/1
5 TABLET ORAL
Qty: 30 TABLET | Refills: 5 | Status: SHIPPED | OUTPATIENT
Start: 2018-08-16 | End: 2019-08-20 | Stop reason: SDUPTHER

## 2018-08-16 NOTE — PROGRESS NOTES
[facility]        NAME: Kalen Dasilva is a 77 y o  male  : 1950    MRN: [id number]  DATE: 2018  TIME: 8:17 AM    Assessment and Plan   Wellness examination [Z00 00]  1  Wellness examination     2  Insomnia, unspecified type  zolpidem (AMBIEN) 5 mg tablet   3  Familial hypercholesterolemia     4  Malignant melanoma of skin ([facility] [address] )     5  Atrial fibrillation, unspecified type [facility])           Patient Instructions     Patient Instructions   Same meds          Chief Complaint     Chief Complaint   Patient presents with    Follow-up     MM/LABS         History of Present Illness       F/u for assessed meds--stable        Review of Systems   Review of Systems   Constitutional: Negative for appetite change, chills, diaphoresis and fever  HENT: Negative for ear pain, rhinorrhea, sinus pressure and sore throat  Eyes: Negative for discharge, redness and itching  Respiratory: Negative for cough, shortness of breath and wheezing  Cardiovascular: Negative for chest pain and palpitations  Gastrointestinal: Negative for abdominal pain, diarrhea, nausea and vomiting           Current Medications       Current Outpatient Prescriptions:     aspirin (ECOTRIN) 325 mg EC tablet, Take 325 mg by mouth daily, Disp: , Rfl:     hydrochlorothiazide (HYDRODIURIL) 12 5 mg tablet, Take 12 5 mg by mouth daily, Disp: , Rfl:     metoprolol tartrate (LOPRESSOR) 50 mg tablet, Take 25 mg by mouth daily, Disp: , Rfl:     Multiple Vitamin (MULTIVITAMIN) tablet, Take 1 tablet by mouth daily, Disp: , Rfl:     pravastatin (PRAVACHOL) 40 mg tablet, Take 40 mg by mouth daily, Disp: , Rfl:     zolpidem (AMBIEN) 5 mg tablet, Take 1 tablet (5 mg total) by mouth daily at bedtime as needed for sleep, Disp: 30 tablet, Rfl: 5    amoxicillin (AMOXIL) 500 mg capsule, , Disp: , Rfl:     Current Allergies     Allergies as of 2018    (No Known Allergies)            The following portions of the patient's history were reviewed and updated as appropriate: allergies, current medications, past family history, past medical history, past social history, past surgical history and problem list      Past Medical History:   Diagnosis Date    Atrial fibrillation ([facility] [address] )     Cancer ([facility] [address] )     malignant melanoma of skin    Cataract of right eye     removal cataract R eye    History of colonic polyps     Hypertension     Insomnia     Mitral valve disorder     history of mitral valve repair    PONV (postoperative nausea and vomiting)        Past Surgical History:   Procedure Laterality Date    APPENDECTOMY      COLONOSCOPY      EYE SURGERY  2009    cataract, detatched retina    LIPOMA RESECTION      right ankle    LYMPH NODE BIOPSY      2/7/11 Dr Gokul Alfaro, Dr Dana Lopez, sentinel lymph node biopsy x2 left posterior neck, lymphoscintigraphy, 12/28/10 Klaudia, Dr Guerline Schultz sclap excision-melanoma, resolved: 2/7/11      MITRAL VALVE REPAIR      IA EXC SKIN MALIG >4 CM FACE,FACIAL Left 11/2/2017    Procedure: CHEEK BASAL CELL CARCINOMA EXCISION; RECONSTRUCTION;  FROZEN SECTION;  Surgeon: Savanah Rome MD;  [facility];  Service: Plastics    IA EXC TUMOR SOFT TISSUE LEG/ANKLE SUBFASCIAL <3CM Right 12/15/2016    Procedure: EXCISION/RESECTION LOWER EXTREMITY MASS;  Surgeon: Savanah Rome MD;  [facility];  Service: Plastics    RETINAL DETACHMENT SURGERY      resolved: 8/2006    SCALP EXCISION      Excision Of Lesion Scalp Malignant Over 4cm, resolved: 2/7/11    SKIN BIOPSY      SKIN GRAFT      autograft,scalp    TONSILLECTOMY         Family History   Problem Relation Age of Onset    Basal cell carcinoma Mother     Colon cancer Mother     Hypertension Mother     Other Mother         Benign Polyps of the Large Intestine    Colon cancer Family          Medications have been verified          Objective   /78   Pulse 76   Resp 16   Ht 6' 1" (1 854 m)   Wt 90 7 kg (200 lb)   BMI 26 39 kg/m²        Physical Exam     Physical Exam   Constitutional: He appears well-developed and well-nourished  No distress  HENT:   Right Ear: Tympanic membrane, external ear and ear canal normal  Tympanic membrane is not injected  Left Ear: Tympanic membrane, external ear and ear canal normal  Tympanic membrane is not injected  Nose: Nose normal    Mouth/Throat: Uvula is midline, oropharynx is clear and moist and mucous membranes are normal    Eyes: Conjunctivae are normal  Pupils are equal, round, and reactive to light  Neck: Normal range of motion  Neck supple  No thyromegaly present  Cardiovascular: Normal rate, regular rhythm and normal heart sounds  No murmur heard  Pulmonary/Chest: Effort normal and breath sounds normal  No respiratory distress  He has no wheezes  Lymphadenopathy:     He has no cervical adenopathy  Skin: He is not diaphoretic

## 2018-08-16 NOTE — PROGRESS NOTES
HPI:  Veronica Lagunas is a 77 y o  male here for his yearly health maintenance exam    Patient Active Problem List   Diagnosis    [facility] fibrillation [facility] [address] [facility]    [facility]    Familial hypercholesterolemia    Insomnia     Past Medical History:   Diagnosis Date    [facility]     [facility]     malignant melanoma of skin    Cataract of right eye     removal cataract R eye    History of colonic polyps     Hypertension     Insomnia     Mitral valve disorder     history of mitral valve repair    PONV (postoperative nausea and vomiting)        1  Advanced Directive: n     2  Durable Power of  for Healthcare: n     3  Social History:           Drug and alcohol History: n                  4  Immunizations up to date: y                 Lifestyle:                           Healthy Diet:ynn                          Alcohol Use:n                          Tobacco Use:n                          Regular exercise:y                          Weight concerns:n                               5  Over the past 2 weeks, how often have you been bothered by the following:              Little interest or pleasure in doing things:nn              Felling down, depressed or hopeless:n       Current Outpatient Prescriptions   Medication Sig Dispense Refill    aspirin (ECOTRIN) 325 mg EC tablet Take 325 mg by mouth daily      hydrochlorothiazide (HYDRODIURIL) 12 5 mg tablet Take 12 5 mg by mouth daily      metoprolol tartrate (LOPRESSOR) 50 mg tablet Take 25 mg by mouth daily      Multiple Vitamin (MULTIVITAMIN) tablet Take 1 tablet by mouth daily      pravastatin (PRAVACHOL) 40 mg tablet Take 40 mg by mouth daily      zolpidem (AMBIEN) 5 mg tablet Take 1 tablet (5 mg total) by mouth daily at bedtime as needed for sleep 30 tablet 5    amoxicillin (AMOXIL) 500 mg capsule        No current facility-administered medications for this visit        No Known Allergies  Immunization History Administered Date(s) Administered    Influenza Split High Dose Preservative Free IM 09/30/2015, 09/16/2016    Influenza TIV (IM) 09/16/2014    Pneumococcal Polysaccharide PPV23 09/18/2013    Zoster 10/22/2014       Patient Care Team:  Pretty Navarrete MD as PCP - MD Edvin Peoples MD      Physical Exam :  Physical Exam     Reviewed Updated [facility] Prior Wellness Visits:   Last Health Maintenance visit information was reviewed, patient interviewed , no change since last HM visit yes  Last HM visit information was reviewed, patient interviewed and updates made to the record today yes    Assessment and Plan:  1  Wellness examination     2  Insomnia, unspecified type  zolpidem (AMBIEN) 5 mg tablet   3  Familial hypercholesterolemia     4  Malignant melanoma of skin ([facility] [address] )     5   Atrial fibrillation, unspecified type [facility])         Health Maintenance Due   Topic Date Due    Hepatitis C Screening  1950    Depression Screening PHQ-9  1950    CRC Screening: Colonoscopy  1950    DTaP,Tdap,and Td Vaccines (1 - Tdap) 08/07/1969    Fall Risk  08/07/2013    GLAUCOMA SCREENING 65 + YR  08/07/2015

## 2018-12-26 ENCOUNTER — TELEPHONE (OUTPATIENT)
Dept: FAMILY MEDICINE CLINIC | Facility: CLINIC | Age: 68
End: 2018-12-26

## 2019-01-03 ENCOUNTER — OFFICE VISIT (OUTPATIENT)
Dept: FAMILY MEDICINE CLINIC | Facility: CLINIC | Age: 69
End: 2019-01-03
Payer: COMMERCIAL

## 2019-01-03 VITALS
RESPIRATION RATE: 16 BRPM | BODY MASS INDEX: 25.98 KG/M2 | SYSTOLIC BLOOD PRESSURE: 120 MMHG | HEART RATE: 86 BPM | HEIGHT: 73 IN | DIASTOLIC BLOOD PRESSURE: 80 MMHG | OXYGEN SATURATION: 94 % | WEIGHT: 196 LBS

## 2019-01-03 DIAGNOSIS — H53.9 VISUAL DISTURBANCE: Primary | ICD-10-CM

## 2019-01-03 PROCEDURE — 99213 OFFICE O/P EST LOW 20 MIN: CPT | Performed by: FAMILY MEDICINE

## 2019-01-03 PROCEDURE — 1036F TOBACCO NON-USER: CPT | Performed by: FAMILY MEDICINE

## 2019-01-03 PROCEDURE — 3725F SCREEN DEPRESSION PERFORMED: CPT | Performed by: FAMILY MEDICINE

## 2019-01-03 PROCEDURE — 3008F BODY MASS INDEX DOCD: CPT | Performed by: FAMILY MEDICINE

## 2019-01-03 PROCEDURE — 1160F RVW MEDS BY RX/DR IN RCRD: CPT | Performed by: FAMILY MEDICINE

## 2019-01-03 NOTE — PROGRESS NOTES
[address]         NAME: Melanie Bryant is a 77 y o  male  : 1950    MRN: [id number]  DATE: January 3, 2019  TIME: 3:25 PM    Assessment and Plan   Visual disturbance [H53 9]  1  Visual disturbance         No problem-specific Assessment & Plan notes found for this encounter  Patient Instructions     There are no Patient Instructions on file for this visit  Chief Complaint     Chief Complaint   Patient presents with    Eye Problem     foggy right eye         History of Present Illness       Patient here for evaluation of right eye blurred vision  This is an present over the last 2 days  There is no discharge from the eye  There is no actual pain in the eye  He does have an acute extensive history in his eye as far as retinal detachment and multiple surgeries  He does see ophthalmologist for these  There has been no trauma  No foreign body sensations present  Review of Systems   Review of Systems   Constitutional: Negative for chills, diaphoresis and fever  HENT: Negative for congestion, postnasal drip, sinus pressure and sore throat  Eyes: Positive for visual disturbance  Negative for photophobia, pain, discharge, redness and itching           Current Medications       Current Outpatient Prescriptions:     amoxicillin (AMOXIL) 500 mg capsule, , Disp: , Rfl:     aspirin (ECOTRIN) 325 mg EC tablet, Take 325 mg by mouth daily, Disp: , Rfl:     hydrochlorothiazide (HYDRODIURIL) 12 5 mg tablet, Take 12 5 mg by mouth daily, Disp: , Rfl:     metoprolol tartrate (LOPRESSOR) 50 mg tablet, Take 25 mg by mouth daily, Disp: , Rfl:     Multiple Vitamin (MULTIVITAMIN) tablet, Take 1 tablet by mouth daily, Disp: , Rfl:     pravastatin (PRAVACHOL) 40 mg tablet, Take 40 mg by mouth daily, Disp: , Rfl:     zolpidem (AMBIEN) 5 mg tablet, Take 1 tablet (5 mg total) by mouth daily at bedtime as needed for sleep, Disp: 30 tablet, Rfl: 5    Current Allergies Allergies as of 01/03/2019    (No Known Allergies)            The following portions of the patient's history were reviewed and updated as appropriate: allergies, current medications, past family history, past medical history, past social history, past surgical history and problem list      Past Medical History:   Diagnosis Date    Atrial fibrillation ([facility] [address] )     Cancer ([facility] [address] )     malignant melanoma of skin    Cataract of right eye     removal cataract R eye    History of colonic polyps     Hypertension     Insomnia     Mitral valve disorder     history of mitral valve repair    PONV (postoperative nausea and vomiting)        Past Surgical History:   Procedure Laterality Date    APPENDECTOMY      COLONOSCOPY      EYE SURGERY  2009    cataract, detatched retina    LIPOMA RESECTION      right ankle    LYMPH NODE BIOPSY      2/7/11 Dr Roma Pastor, Dr Yobany Mireles, sentinel lymph node biopsy x2 left posterior neck, lymphoscintigraphy, 12/28/10 Klaudia, Dr Jing Bateman sclap excision-melanoma, resolved: 2/7/11      MITRAL VALVE REPAIR      AL EXC SKIN MALIG >4 CM FACE,FACIAL Left 11/2/2017    Procedure: CHEEK BASAL CELL CARCINOMA EXCISION; RECONSTRUCTION;  FROZEN SECTION;  Surgeon: Vonnie Bravo MD;  [facility];  Service: Plastics    AL EXC TUMOR SOFT TISSUE LEG/ANKLE SUBFASCIAL <3CM Right 12/15/2016    Procedure: EXCISION/RESECTION LOWER EXTREMITY MASS;  Surgeon: Vonnie Bravo MD;  [facility];  Service: Plastics    RETINAL DETACHMENT SURGERY      resolved: 8/2006    SCALP EXCISION      Excision Of Lesion Scalp Malignant Over 4cm, resolved: 2/7/11    SKIN BIOPSY      SKIN GRAFT      autograft,scalp    TONSILLECTOMY         Family History   Problem Relation Age of Onset    Basal cell carcinoma Mother     Colon cancer Mother     Hypertension Mother     Other Mother         Benign Polyps of the Large Intestine    Colon cancer Family     Substance Abuse Neg Hx     Mental illness Neg Hx          Medications have been verified  Objective   /80 (BP Location: Left arm, Patient Position: Sitting, Cuff Size: Standard)   Pulse 86   Resp 16   Ht 6' 1" (1 854 m)   Wt 88 9 kg (196 lb)   SpO2 94%   BMI 25 86 kg/m²        Physical Exam     Physical Exam   Constitutional: He appears well-developed and well-nourished  He appears distressed  HENT:   Head: Normocephalic and atraumatic  Eyes: EOM are normal  Right eye exhibits no discharge  Left eye exhibits no discharge  Right eye-there is mild swelling of the lower lid conjunctiva, however not markedly different any other side  No discharge is seen  There is no scleral injection

## 2019-01-03 NOTE — PATIENT INSTRUCTIONS
Cool compresses 5-10 minutes 3 or 4 times a day  I would advise should have your ophthalmologist evaluate the eye due to previous problems you had with that eye  If he develops significant vision change or pain around the eye you should go to the emergency room for evaluation

## 2019-03-28 NOTE — TELEPHONE ENCOUNTER
HAS OV 8/16/18 WITH TW TO DISCUSS      ----- Message from Vidal Faulkner MD sent at 8/14/2018  6:21 AM EDT -----  MM/LABS 12MOS Letter mailed to pt with labs and message from provider.  Yocasta Hicks RN

## 2019-04-01 ENCOUNTER — APPOINTMENT (OUTPATIENT)
Dept: RADIOLOGY | Facility: CLINIC | Age: 69
End: 2019-04-01
Payer: COMMERCIAL

## 2019-04-01 DIAGNOSIS — C43.4 MALIGNANT MELANOMA OF SCALP (HCC): ICD-10-CM

## 2019-04-01 PROCEDURE — 71046 X-RAY EXAM CHEST 2 VIEWS: CPT

## 2019-04-05 LAB
ALBUMIN SERPL-MCNC: 4 G/DL (ref 3.5–4.8)
ALBUMIN/GLOB SERPL: 1.6 {RATIO} (ref 1.2–2.2)
ALP SERPL-CCNC: 70 IU/L (ref 39–117)
ALT SERPL-CCNC: 17 IU/L (ref 0–44)
AST SERPL-CCNC: 18 IU/L (ref 0–40)
BASOPHILS # BLD AUTO: 0 X10E3/UL (ref 0–0.2)
BASOPHILS NFR BLD AUTO: 0 %
BILIRUB SERPL-MCNC: 0.6 MG/DL (ref 0–1.2)
BUN SERPL-MCNC: 22 MG/DL (ref 8–27)
BUN/CREAT SERPL: 20 (ref 10–24)
CALCIUM SERPL-MCNC: 9 MG/DL (ref 8.6–10.2)
CHLORIDE SERPL-SCNC: 103 MMOL/L (ref 96–106)
CO2 SERPL-SCNC: 28 MMOL/L (ref 20–29)
CREAT SERPL-MCNC: 1.08 MG/DL (ref 0.76–1.27)
EOSINOPHIL # BLD AUTO: 0.1 X10E3/UL (ref 0–0.4)
EOSINOPHIL NFR BLD AUTO: 2 %
ERYTHROCYTE [DISTWIDTH] IN BLOOD BY AUTOMATED COUNT: 14.1 % (ref 12.3–15.4)
GLOBULIN SER-MCNC: 2.5 G/DL (ref 1.5–4.5)
GLUCOSE SERPL-MCNC: 83 MG/DL (ref 65–99)
HCT VFR BLD AUTO: 44.9 % (ref 37.5–51)
HGB BLD-MCNC: 15.8 G/DL (ref 13–17.7)
IMM GRANULOCYTES # BLD: 0 X10E3/UL (ref 0–0.1)
IMM GRANULOCYTES NFR BLD: 0 %
LABCORP COMMENT: NORMAL
LDH SERPL-CCNC: 149 IU/L (ref 121–224)
LYMPHOCYTES # BLD AUTO: 1.9 X10E3/UL (ref 0.7–3.1)
LYMPHOCYTES NFR BLD AUTO: 30 %
MCH RBC QN AUTO: 32.4 PG (ref 26.6–33)
MCHC RBC AUTO-ENTMCNC: 35.2 G/DL (ref 31.5–35.7)
MCV RBC AUTO: 92 FL (ref 79–97)
MONOCYTES # BLD AUTO: 0.5 X10E3/UL (ref 0.1–0.9)
MONOCYTES NFR BLD AUTO: 7 %
NEUTROPHILS # BLD AUTO: 3.9 X10E3/UL (ref 1.4–7)
NEUTROPHILS NFR BLD AUTO: 61 %
PLATELET # BLD AUTO: 286 X10E3/UL (ref 150–379)
POTASSIUM SERPL-SCNC: 4 MMOL/L (ref 3.5–5.2)
PROT SERPL-MCNC: 6.5 G/DL (ref 6–8.5)
RBC # BLD AUTO: 4.87 X10E6/UL (ref 4.14–5.8)
SL AMB EGFR AFRICAN AMERICAN: 80 ML/MIN/1.73
SL AMB EGFR NON AFRICAN AMERICAN: 69 ML/MIN/1.73
SODIUM SERPL-SCNC: 142 MMOL/L (ref 134–144)
WBC # BLD AUTO: 6.4 X10E3/UL (ref 3.4–10.8)

## 2019-04-16 ENCOUNTER — OFFICE VISIT (OUTPATIENT)
Dept: HEMATOLOGY ONCOLOGY | Facility: CLINIC | Age: 69
End: 2019-04-16
Payer: COMMERCIAL

## 2019-04-16 VITALS
SYSTOLIC BLOOD PRESSURE: 118 MMHG | DIASTOLIC BLOOD PRESSURE: 76 MMHG | HEART RATE: 93 BPM | RESPIRATION RATE: 14 BRPM | HEIGHT: 73 IN | WEIGHT: 196 LBS | BODY MASS INDEX: 25.98 KG/M2 | TEMPERATURE: 98.7 F

## 2019-04-16 DIAGNOSIS — C43.9 MALIGNANT MELANOMA OF SKIN (HCC): Primary | ICD-10-CM

## 2019-04-16 PROCEDURE — 99213 OFFICE O/P EST LOW 20 MIN: CPT | Performed by: PHYSICIAN ASSISTANT

## 2019-04-16 RX ORDER — RIVAROXABAN 20 MG/1
TABLET, FILM COATED ORAL
COMMUNITY
Start: 2019-03-21 | End: 2020-01-14 | Stop reason: HOSPADM

## 2019-07-22 DIAGNOSIS — E78.01 FAMILIAL HYPERCHOLESTEROLEMIA: ICD-10-CM

## 2019-07-22 DIAGNOSIS — Z12.5 SCREENING PSA (PROSTATE SPECIFIC ANTIGEN): Primary | ICD-10-CM

## 2019-07-22 DIAGNOSIS — E78.2 MIXED HYPERLIPIDEMIA: ICD-10-CM

## 2019-08-16 LAB
ALBUMIN SERPL-MCNC: 4.1 G/DL (ref 3.5–4.8)
ALBUMIN/GLOB SERPL: 1.6 {RATIO} (ref 1.2–2.2)
ALP SERPL-CCNC: 73 IU/L (ref 39–117)
ALT SERPL-CCNC: 13 IU/L (ref 0–44)
AST SERPL-CCNC: 18 IU/L (ref 0–40)
BILIRUB SERPL-MCNC: 0.7 MG/DL (ref 0–1.2)
BUN SERPL-MCNC: 25 MG/DL (ref 8–27)
BUN/CREAT SERPL: 25 (ref 10–24)
CALCIUM SERPL-MCNC: 9.2 MG/DL (ref 8.6–10.2)
CHLORIDE SERPL-SCNC: 102 MMOL/L (ref 96–106)
CHOLEST SERPL-MCNC: 127 MG/DL (ref 100–199)
CHOLEST/HDLC SERPL: 3.5 RATIO (ref 0–5)
CO2 SERPL-SCNC: 24 MMOL/L (ref 20–29)
CREAT SERPL-MCNC: 1.02 MG/DL (ref 0.76–1.27)
GLOBULIN SER-MCNC: 2.5 G/DL (ref 1.5–4.5)
GLUCOSE SERPL-MCNC: 84 MG/DL (ref 65–99)
HDLC SERPL-MCNC: 36 MG/DL
LDLC SERPL CALC-MCNC: 59 MG/DL (ref 0–99)
POTASSIUM SERPL-SCNC: 4.5 MMOL/L (ref 3.5–5.2)
PROT SERPL-MCNC: 6.6 G/DL (ref 6–8.5)
PSA SERPL-MCNC: 1.8 NG/ML (ref 0–4)
SL AMB EGFR AFRICAN AMERICAN: 85 ML/MIN/1.73
SL AMB EGFR NON AFRICAN AMERICAN: 74 ML/MIN/1.73
SL AMB REFLEX CRITERIA: NORMAL
SL AMB VLDL CHOLESTEROL CALC: 32 MG/DL (ref 5–40)
SODIUM SERPL-SCNC: 140 MMOL/L (ref 134–144)
TRIGL SERPL-MCNC: 158 MG/DL (ref 0–149)

## 2019-08-20 ENCOUNTER — OFFICE VISIT (OUTPATIENT)
Dept: FAMILY MEDICINE CLINIC | Facility: CLINIC | Age: 69
End: 2019-08-20
Payer: COMMERCIAL

## 2019-08-20 VITALS
BODY MASS INDEX: 25.98 KG/M2 | SYSTOLIC BLOOD PRESSURE: 118 MMHG | HEART RATE: 88 BPM | RESPIRATION RATE: 16 BRPM | WEIGHT: 196 LBS | DIASTOLIC BLOOD PRESSURE: 74 MMHG | HEIGHT: 73 IN

## 2019-08-20 DIAGNOSIS — E78.01 FAMILIAL HYPERCHOLESTEROLEMIA: ICD-10-CM

## 2019-08-20 DIAGNOSIS — C43.9 MALIGNANT MELANOMA OF SKIN (HCC): ICD-10-CM

## 2019-08-20 DIAGNOSIS — I05.9 MITRAL VALVE DISORDER: ICD-10-CM

## 2019-08-20 DIAGNOSIS — Z00.00 WELLNESS EXAMINATION: ICD-10-CM

## 2019-08-20 DIAGNOSIS — I48.91 ATRIAL FIBRILLATION, UNSPECIFIED TYPE (HCC): Primary | ICD-10-CM

## 2019-08-20 DIAGNOSIS — G47.00 INSOMNIA, UNSPECIFIED TYPE: ICD-10-CM

## 2019-08-20 PROCEDURE — 99214 OFFICE O/P EST MOD 30 MIN: CPT | Performed by: FAMILY MEDICINE

## 2019-08-20 PROCEDURE — 3725F SCREEN DEPRESSION PERFORMED: CPT | Performed by: FAMILY MEDICINE

## 2019-08-20 PROCEDURE — 1125F AMNT PAIN NOTED PAIN PRSNT: CPT | Performed by: FAMILY MEDICINE

## 2019-08-20 PROCEDURE — 3008F BODY MASS INDEX DOCD: CPT | Performed by: FAMILY MEDICINE

## 2019-08-20 PROCEDURE — G0439 PPPS, SUBSEQ VISIT: HCPCS | Performed by: FAMILY MEDICINE

## 2019-08-20 PROCEDURE — 1160F RVW MEDS BY RX/DR IN RCRD: CPT | Performed by: FAMILY MEDICINE

## 2019-08-20 PROCEDURE — 1170F FXNL STATUS ASSESSED: CPT | Performed by: FAMILY MEDICINE

## 2019-08-20 RX ORDER — ZOLPIDEM TARTRATE 5 MG/1
5 TABLET ORAL
Qty: 30 TABLET | Refills: 5 | Status: SHIPPED | OUTPATIENT
Start: 2019-08-20 | End: 2020-12-03 | Stop reason: SDUPTHER

## 2019-08-20 RX ORDER — HYDROCHLOROTHIAZIDE 25 MG/1
TABLET ORAL
Refills: 3 | COMMUNITY
Start: 2019-08-09 | End: 2019-08-20

## 2019-08-20 RX ORDER — METOPROLOL SUCCINATE 50 MG/1
50 TABLET, EXTENDED RELEASE ORAL DAILY
Refills: 3 | COMMUNITY
Start: 2019-08-09

## 2019-08-20 NOTE — PROGRESS NOTES
[facility]        NAME: Radha Garcias is a 68 y o  male  : 1950    MRN: [id number]  DATE: 2019  TIME: 7:43 AM    Assessment and Plan   Atrial fibrillation, unspecified type ([facility] ) [I48 91]  1  Atrial fibrillation, unspecified type ([address] )     2  Malignant melanoma of skin ([facility] [address] )     3  Mitral valve disorder     4  Familial hypercholesterolemia     5  Wellness examination     6  Insomnia, unspecified type  zolpidem (AMBIEN) 5 mg tablet         Patient Instructions     Patient Instructions   Same meds          Chief Complaint     Chief Complaint   Patient presents with    Medicare Wellness Visit     LABS         History of Present Illness       F/u for assessed med cond--stable      Review of Systems   Review of Systems   Constitutional: Negative for fatigue, fever and unexpected weight change  HENT: Negative for congestion, sinus pain and sore throat  Eyes: Negative for visual disturbance  Respiratory: Negative for shortness of breath and wheezing  Cardiovascular: Negative for chest pain and palpitations  Gastrointestinal: Negative for abdominal pain, nausea and vomiting  Musculoskeletal: Negative  Negative for arthralgias and myalgias  Neurological: Negative for syncope, weakness and numbness  Psychiatric/Behavioral: Negative  Negative for confusion, dysphoric mood and suicidal ideas           Current Medications       Current Outpatient Medications:     amoxicillin (AMOXIL) 500 mg capsule, , Disp: , Rfl:     hydrochlorothiazide (HYDRODIURIL) 12 5 mg tablet, Take 12 5 mg by mouth daily, Disp: , Rfl:     metoprolol succinate (TOPROL-XL) 50 mg 24 hr tablet, Take 50 mg by mouth daily, Disp: , Rfl: 3    pravastatin (PRAVACHOL) 40 mg tablet, Take 40 mg by mouth daily, Disp: , Rfl:     XARELTO 20 MG tablet, , Disp: , Rfl:     zolpidem (AMBIEN) 5 mg tablet, Take 1 tablet (5 mg total) by mouth daily at bedtime as needed for sleep, Disp: 30 tablet, Rfl: 5    aspirin (ECOTRIN) 325 mg EC tablet, Take 325 mg by mouth daily, Disp: , Rfl:     Current Allergies     Allergies as of 08/20/2019    (No Known Allergies)            The following portions of the patient's history were reviewed and updated as appropriate: allergies, current medications, past family history, past medical history, past social history, past surgical history and problem list      Past Medical History:   Diagnosis Date    Atrial fibrillation ([facility] [address] )     Cancer ([facility] [address] )     malignant melanoma of skin    Cataract of right eye     removal cataract R eye    History of colonic polyps     Hypertension     Insomnia     Mitral valve disorder     history of mitral valve repair    PONV (postoperative nausea and vomiting)        Past Surgical History:   Procedure Laterality Date    APPENDECTOMY      COLONOSCOPY      EYE SURGERY  2009    cataract, detatched retina    LIPOMA RESECTION      right ankle    LYMPH NODE BIOPSY      2/7/11 Dr Dameon Seth, Dr Jenelle Covarrubias, sentinel lymph node biopsy x2 left posterior neck, lymphoscintigraphy, 12/28/10 Dr Keshav Rudolph Bank sclap excision-melanoma, resolved: 2/7/11      MITRAL VALVE REPAIR      LA EXC SKIN MALIG >4 CM FACE,FACIAL Left 11/2/2017    Procedure: CHEEK BASAL CELL CARCINOMA EXCISION; RECONSTRUCTION;  FROZEN SECTION;  Surgeon: Clement Dey MD;  [facility];  Service: Plastics    LA EXC TUMOR SOFT TISSUE LEG/ANKLE SUBFASCIAL <3CM Right 12/15/2016    Procedure: EXCISION/RESECTION LOWER EXTREMITY MASS;  Surgeon: Clement Dey MD;  [facility];  Service: Plastics    RETINAL DETACHMENT SURGERY      resolved: 8/2006    SCALP EXCISION      Excision Of Lesion Scalp Malignant Over 4cm, resolved: 2/7/11    SKIN BIOPSY      SKIN GRAFT      autograft,scalp    TONSILLECTOMY         Family History   Problem Relation Age of Onset    Basal cell carcinoma Mother     Colon cancer Mother     Hypertension Mother    [facility] Other Mother         Benign Polyps of the Large Intestine    Colon cancer Family     Substance Abuse Neg Hx     Mental illness Neg Hx          Medications have been verified  Objective   /74   Pulse 88   Resp 16   Ht 6' 1" (1 854 m)   Wt 88 9 kg (196 lb)   BMI 25 86 kg/m²        Physical Exam     Physical Exam   Constitutional: He is oriented to person, place, and time  Vital signs are normal  He appears well-developed and well-nourished  HENT:   Right Ear: Ear canal normal  Tympanic membrane is not injected  Left Ear: Ear canal normal  Tympanic membrane is not injected  Nose: Nose normal    Mouth/Throat: Oropharynx is clear and moist    Eyes: Pupils are equal, round, and reactive to light  Conjunctivae and EOM are normal  Right eye exhibits no discharge  Left eye exhibits no discharge  Neck: Normal range of motion  Neck supple  No thyromegaly present  Cardiovascular: Normal rate, regular rhythm and normal heart sounds  No murmur heard  Pulmonary/Chest: Effort normal and breath sounds normal  No respiratory distress  He has no wheezes  Abdominal: Soft  Bowel sounds are normal  He exhibits no distension  There is no tenderness  Musculoskeletal: Normal range of motion  Lymphadenopathy:     He has no cervical adenopathy  Neurological: He is alert and oriented to person, place, and time  He has normal strength and normal reflexes  He is not disoriented  No sensory deficit  Gait normal    Skin: Skin is warm and dry  Psychiatric: He has a normal mood and affect  His speech is normal and behavior is normal  Judgment and thought content normal  Cognition and memory are normal        BMI Counseling: Body mass index is 25 86 kg/m²  Discussed the patient's BMI with him  The BMI is above average  BMI counseling and education was provided to the patient  Nutrition recommendations include reducing portion sizes

## 2019-08-20 NOTE — PROGRESS NOTES
Assessment and Plan:     Problem List Items Addressed This Visit        Cardiovascular and Mediastinum    Atrial fibrillation ([address] ) - Primary    Relevant Medications    metoprolol succinate (TOPROL-XL) 50 mg 24 hr tablet    Mitral valve disorder    Relevant Medications    metoprolol succinate (TOPROL-XL) 50 mg 24 hr tablet       Musculoskeletal and Integument    Malignant melanoma of skin ([address] )       Other    Familial hypercholesterolemia    Insomnia    Relevant Medications    zolpidem (AMBIEN) 5 mg tablet      Other Visit Diagnoses     Wellness examination             History of Present Illness:     Patient presents for Medicare Annual Wellness visit    Patient Care Team:  Deidra Elias MD as PCP - MD Fidel Nath MD     Problem List:     Patient Active Problem List   Diagnosis    Atrial fibrillation ([facility] [address] )    Malignant melanoma of skin ([facility] [address] )    Familial hypercholesterolemia    Insomnia    Mitral valve disorder      Past Medical and Surgical History:     Past Medical History:   Diagnosis Date    Atrial fibrillation ([address] )     Cancer ([address] )     malignant melanoma of skin    Cataract of right eye     removal cataract R eye    History of colonic polyps     Hypertension     Insomnia     Mitral valve disorder     history of mitral valve repair    PONV (postoperative nausea and vomiting)      Past Surgical History:   Procedure Laterality Date    APPENDECTOMY      COLONOSCOPY      EYE SURGERY  2009    cataract, detatched retina    LIPOMA RESECTION      right ankle    LYMPH NODE BIOPSY      2/7/11 Dr Bhupinder Morris, Dr Rowland Grayslake, sentinel lymph node biopsy x2 left posterior neck, lymphoscintigraphy, 12/28/10 Dr Deidra Rudolph sclap excision-melanoma, resolved: 2/7/11      MITRAL VALVE REPAIR      FL EXC SKIN MALIG >4 CM FACE,FACIAL Left 11/2/2017    Procedure: CHEEK BASAL CELL CARCINOMA EXCISION; RECONSTRUCTION;  FROZEN SECTION;  Surgeon: Jennifer Freire Reagan Osuna MD;  [facility];  Service: Plastics    VT EXC TUMOR SOFT TISSUE LEG/ANKLE SUBFASCIAL <3CM Right 12/15/2016    Procedure: EXCISION/RESECTION LOWER EXTREMITY MASS;  Surgeon: Savanah Rome MD;  [facility];  Service: Plastics    RETINAL DETACHMENT SURGERY      resolved: 8/2006    SCALP EXCISION      Excision Of Lesion Scalp Malignant Over 4cm, resolved: 2/7/11    SKIN BIOPSY      SKIN GRAFT      autograft,scalp    TONSILLECTOMY        Family History:     Family History   Problem Relation Age of Onset    Basal cell carcinoma Mother     Colon cancer Mother     Hypertension Mother     Other Mother         Benign Polyps of the Large Intestine    Colon cancer Family     Substance Abuse Neg Hx     Mental illness Neg Hx       Social History:     Social History     Tobacco Use   Smoking Status Never Smoker   Smokeless Tobacco Never Used     Social History     Substance and Sexual Activity   Alcohol Use No    Comment: social     Social History     Substance and Sexual Activity   Drug Use No      Medications and Allergies:     Current Outpatient Medications   Medication Sig Dispense Refill    amoxicillin (AMOXIL) 500 mg capsule       hydrochlorothiazide (HYDRODIURIL) 12 5 mg tablet Take 12 5 mg by mouth daily      metoprolol succinate (TOPROL-XL) 50 mg 24 hr tablet Take 50 mg by mouth daily  3    pravastatin (PRAVACHOL) 40 mg tablet Take 40 mg by mouth daily      XARELTO 20 MG tablet       zolpidem (AMBIEN) 5 mg tablet Take 1 tablet (5 mg total) by mouth daily at bedtime as needed for sleep 30 tablet 5    aspirin (ECOTRIN) 325 mg EC tablet Take 325 mg by mouth daily       No current facility-administered medications for this visit        No Known Allergies   Immunizations:     Immunization History   Administered Date(s) Administered    INFLUENZA 10/03/2017, 09/04/2018    Influenza Split High Dose Preservative Free IM 09/30/2015, 09/16/2016    Influenza TIV (IM) 09/16/2014  Pneumococcal Polysaccharide PPV23 09/18/2013    Zoster 10/22/2014      Medicare Screening Tests and Risk Assessments:     Maria Esther Katz is here for his Welcome to [employer] and Initial Wellness visit  Health Risk Assessment:  Patient rates overall health as good  Patient feels that their physical health rating is Same  Eyesight was rated as Slightly worse  Hearing was rated as Same  Patient feels that their emotional and mental health rating is Same  Pain experienced by patient in the last 7 days has been None  Patient states that he has experienced no weight loss or gain in last 6 months  Emotional/Mental Health:  Patient has not been feeling nervous/anxious  PHQ-9 Depression Screening:    Frequency of the following problems over the past two weeks:      1  Little interest or pleasure in doing things: 0 - not at all      2  Feeling down, depressed, or hopeless: 0 - not at all  PHQ-2 Score: 0          Broken Bones/Falls: Fall Risk Assessment:    In the past year, patient has experienced: No history of falling in past year          Bladder/Bowel:  Patient has not leaked urine accidently in the last six months  Patient reports no loss of bowel control  Immunizations:  Patient has had a flu vaccination within the last year  Patient has received a pneumonia shot  Patient has received a shingles shot  Patient has received tetanus/diphtheria shot  Home Safety:  Patient does not have trouble with stairs inside or outside of their home  Patient currently reports that there are no safety hazards present in home, working smoke alarms, no working carbon monoxide detectors  Preventative Screenings:   prostate cancer screen performed, colon cancer screen completed, cholesterol screen completed, glaucoma eye exam completed,     Nutrition:  Current diet: Regular with servings of the following:    Medications:  Patient is currently taking over-the-counter supplements  Patient is able to manage medications  Lifestyle Choices:  Patient reports no tobacco use  Patient has not smoked or used tobacco in the past   Patient reports no alcohol use  Patient drives a vehicle  Patient wears seat belt  Current level of exercise of physical activity described by patient as: MODERATE   (Additional Comments: GYM 3 X WEEKLY)    Activities of Daily Living:  Can get out of bed by his or her self, able to dress self, able to make own meals, able to do own shopping, able to bathe self, can do own laundry/housekeeping, can manage own money, pay bills and track expenses    Previous Hospitalizations:  No hospitalization or ED visit in past 12 months        Advanced Directives:  Patient has decided on a power of   Patient has spoken to designated power of   Patient has completed advanced directive  Preventative Screening/Counseling:      Cardiovascular:      General: Screening Current          Diabetes:      General: Screening Current          Colorectal Cancer:      General: Screening Not Indicated          Prostate Cancer:      General: Screening Current          Osteoporosis:      General: Screening Not Indicated          AAA:      General: Screening Not Indicated          Glaucoma:      General: Screening Current          HIV:      General: Screening Not Indicated          Hepatitis C:      General: Screening Not Indicated        Advanced Directives:   Patient has living will for healthcare, has durable POA for healthcare, patient has an advanced directive  Information on ACP and/or AD not provided  No 5 wishes given  End of life assessment reviewed with patient  Provider agrees with end of life decisions        Immunizations:  Patient reviewed and up to date

## 2019-12-12 ENCOUNTER — OFFICE VISIT (OUTPATIENT)
Dept: FAMILY MEDICINE CLINIC | Facility: CLINIC | Age: 69
End: 2019-12-12
Payer: COMMERCIAL

## 2019-12-12 VITALS
SYSTOLIC BLOOD PRESSURE: 126 MMHG | BODY MASS INDEX: 25.98 KG/M2 | OXYGEN SATURATION: 97 % | WEIGHT: 196 LBS | DIASTOLIC BLOOD PRESSURE: 84 MMHG | HEIGHT: 73 IN | HEART RATE: 67 BPM

## 2019-12-12 DIAGNOSIS — K40.90 UNILATERAL INGUINAL HERNIA WITHOUT OBSTRUCTION OR GANGRENE, RECURRENCE NOT SPECIFIED: Primary | ICD-10-CM

## 2019-12-12 PROCEDURE — 3725F SCREEN DEPRESSION PERFORMED: CPT | Performed by: FAMILY MEDICINE

## 2019-12-12 PROCEDURE — 1036F TOBACCO NON-USER: CPT | Performed by: FAMILY MEDICINE

## 2019-12-12 PROCEDURE — 99213 OFFICE O/P EST LOW 20 MIN: CPT | Performed by: FAMILY MEDICINE

## 2019-12-12 PROCEDURE — 3008F BODY MASS INDEX DOCD: CPT | Performed by: FAMILY MEDICINE

## 2019-12-12 PROCEDURE — 1160F RVW MEDS BY RX/DR IN RCRD: CPT | Performed by: FAMILY MEDICINE

## 2019-12-12 NOTE — PROGRESS NOTES
[facility] Medical        NAME: Brandee Ulrich is a 68 y o  male  : 1950    MRN: [id number]  DATE: 2019  TIME: 11:14 AM    Assessment and Plan   Unilateral inguinal hernia without obstruction or gangrene, recurrence not specified [K40 90]  1  Unilateral inguinal hernia without obstruction or gangrene, recurrence not specified  Ambulatory referral to General Surgery         Patient Instructions     Patient Instructions   Refer surg          Chief Complaint     Chief Complaint   Patient presents with    Mass     right side groin         History of Present Illness       C/o R groin pain      Review of Systems   Review of Systems   Constitutional: Negative for fatigue, fever and unexpected weight change  HENT: Negative for congestion, sinus pressure and sore throat  Eyes: Negative for visual disturbance  Respiratory: Negative for shortness of breath and wheezing  Cardiovascular: Negative for chest pain and palpitations  Gastrointestinal: Negative for abdominal pain, diarrhea, nausea and vomiting           Current Medications       Current Outpatient Medications:     amoxicillin (AMOXIL) 500 mg capsule, , Disp: , Rfl:     hydrochlorothiazide (HYDRODIURIL) 12 5 mg tablet, Take 12 5 mg by mouth daily, Disp: , Rfl:     metoprolol succinate (TOPROL-XL) 50 mg 24 hr tablet, Take 50 mg by mouth daily, Disp: , Rfl: 3    pravastatin (PRAVACHOL) 40 mg tablet, Take 40 mg by mouth daily, Disp: , Rfl:     XARELTO 20 MG tablet, , Disp: , Rfl:     zolpidem (AMBIEN) 5 mg tablet, Take 1 tablet (5 mg total) by mouth daily at bedtime as needed for sleep, Disp: 30 tablet, Rfl: 5    Current Allergies     Allergies as of 2019    (No Known Allergies)            The following portions of the patient's history were reviewed and updated as appropriate: allergies, current medications, past family history, past medical history, past social history, past surgical history and problem list      Past Medical History:   Diagnosis Date    Atrial fibrillation ([facility] [address] )     Cancer ([facility] [address] )     malignant melanoma of skin    Cataract of right eye     removal cataract R eye    History of colonic polyps     Hypertension     Insomnia     Mitral valve disorder     history of mitral valve repair    PONV (postoperative nausea and vomiting)        Past Surgical History:   Procedure Laterality Date    APPENDECTOMY      COLONOSCOPY      EYE SURGERY  2009    cataract, detatched retina    LIPOMA RESECTION      right ankle    LYMPH NODE BIOPSY      2/7/11 Dr Samira Crowder, Dr Chelsy Coyle, sentinel lymph node biopsy x2 left posterior neck, lymphoscintigraphy, 12/28/10 Klaudia, Dr Barbara Schmidt sclap excision-melanoma, resolved: 2/7/11      MITRAL VALVE REPAIR      MN EXC SKIN MALIG >4 CM FACE,FACIAL Left 11/2/2017    Procedure: CHEEK BASAL CELL CARCINOMA EXCISION; RECONSTRUCTION;  FROZEN SECTION;  Surgeon: Mavis Chadwick MD;  [facility];  Service: Plastics    MN EXC TUMOR SOFT TISSUE LEG/ANKLE SUBFASCIAL <3CM Right 12/15/2016    Procedure: EXCISION/RESECTION LOWER EXTREMITY MASS;  Surgeon: Mavis Chadwick MD;  [facility];  Service: Plastics    RETINAL DETACHMENT SURGERY      resolved: 8/2006    SCALP EXCISION      Excision Of Lesion Scalp Malignant Over 4cm, resolved: 2/7/11    SKIN BIOPSY      SKIN GRAFT      autograft,scalp    TONSILLECTOMY         Family History   Problem Relation Age of Onset    Basal cell carcinoma Mother     Colon cancer Mother     Hypertension Mother     Other Mother         Benign Polyps of the Large Intestine    Colon cancer Family     Substance Abuse Neg Hx     Mental illness Neg Hx          Medications have been verified  Objective   /84   Pulse 67   Ht 6' 1" (1 854 m)   Wt 88 9 kg (196 lb)   SpO2 97%   BMI 25 86 kg/m²        Physical Exam     Physical Exam   Cardiovascular: Normal heart sounds     Pulmonary/Chest: Breath sounds normal    Genitourinary:   Genitourinary Comments: lg R inguinal hernia     BMI Counseling: Body mass index is 25 86 kg/m²  The BMI is above normal  Nutrition recommendations include reducing portion sizes

## 2019-12-23 ENCOUNTER — CONSULT (OUTPATIENT)
Dept: SURGERY | Facility: HOSPITAL | Age: 69
End: 2019-12-23
Payer: COMMERCIAL

## 2019-12-23 ENCOUNTER — OFFICE VISIT (OUTPATIENT)
Dept: LAB | Facility: HOSPITAL | Age: 69
End: 2019-12-23
Attending: SURGERY
Payer: COMMERCIAL

## 2019-12-23 ENCOUNTER — HOSPITAL ENCOUNTER (OUTPATIENT)
Dept: RADIOLOGY | Facility: HOSPITAL | Age: 69
Discharge: HOME | End: 2019-12-23
Attending: SURGERY
Payer: COMMERCIAL

## 2019-12-23 VITALS
SYSTOLIC BLOOD PRESSURE: 132 MMHG | WEIGHT: 193.4 LBS | HEART RATE: 76 BPM | TEMPERATURE: 98.1 F | BODY MASS INDEX: 25.63 KG/M2 | HEIGHT: 73 IN | DIASTOLIC BLOOD PRESSURE: 89 MMHG | RESPIRATION RATE: 16 BRPM

## 2019-12-23 DIAGNOSIS — D22.9 MULTIPLE PIGMENTED NEVI: ICD-10-CM

## 2019-12-23 DIAGNOSIS — K40.20 NON-RECURRENT BILATERAL INGUINAL HERNIA WITHOUT OBSTRUCTION OR GANGRENE: ICD-10-CM

## 2019-12-23 DIAGNOSIS — M62.08 RECTUS DIASTASIS: ICD-10-CM

## 2019-12-23 DIAGNOSIS — D18.01 HEMANGIOMA OF SKIN: ICD-10-CM

## 2019-12-23 DIAGNOSIS — L72.3 SEBACEOUS CYST: ICD-10-CM

## 2019-12-23 DIAGNOSIS — K40.20 NON-RECURRENT BILATERAL INGUINAL HERNIA WITHOUT OBSTRUCTION OR GANGRENE: Primary | ICD-10-CM

## 2019-12-23 DIAGNOSIS — L21.9 SEBORRHEA: ICD-10-CM

## 2019-12-23 DIAGNOSIS — K40.90 UNILATERAL INGUINAL HERNIA WITHOUT OBSTRUCTION OR GANGRENE, RECURRENCE NOT SPECIFIED: ICD-10-CM

## 2019-12-23 LAB
ATRIAL RATE: 83 BPM
QRS AXIS: 9 DEGREES
QRSD INTERVAL: 84 MS
QT INTERVAL: 388 MS
QTC INTERVAL: 433 MS
T WAVE AXIS: 27 DEGREES
VENTRICULAR RATE: 75 BPM

## 2019-12-23 PROCEDURE — 71046 X-RAY EXAM CHEST 2 VIEWS: CPT

## 2019-12-23 PROCEDURE — 93005 ELECTROCARDIOGRAM TRACING: CPT

## 2019-12-23 PROCEDURE — 99204 OFFICE O/P NEW MOD 45 MIN: CPT | Performed by: SURGERY

## 2019-12-23 PROCEDURE — 93010 ELECTROCARDIOGRAM REPORT: CPT

## 2019-12-23 NOTE — H&P (VIEW-ONLY)
Assessment/Plan:  Kassandra Ybarra is a 68year old male who presents today per referral by Dr Rodolfo Chandra regarding possible right inguinal hernia  Physical exam revealed bilateral inguinal hernias  Explained the etiology of these hernias  Discussed the risks, benefits and alternatives to a laparoscopic bilateral inguinal hernias repair  Explained the procedure as well as pre- and post- procedural protocols  Lifting restrictions of no greater than 15 pounds and no strenuous activity for the first two weeks after the surgery  Lifting restrictions of no greater than 25 pounds for weeks 3-4  Encourage light cardiovascular activity like walking after the procedure  He should not drive or return to work while he is taking narcotics  The office is going to schedule him for the procedure  He knows to contact the office if any questions or concerns arise  Skin- Physical exam revealed multiple pigmented nevi, seborrheas, hemangiomas of the back  Small cyst of the back  Explained the etiology of these skin lesions and that they are benign at this time  His PCP should monitor them annually  Rectus diastasis- Physical exam revealed rectus diastasis  Explained the etiology of rectus diastasis and that this is not a hernia however, the risks of getting a hernia on this area are higher  No problem-specific Assessment & Plan notes found for this encounter  Diagnoses and all orders for this visit:    Unilateral inguinal hernia without obstruction or gangrene, recurrence not specified  -     Ambulatory referral to General Surgery          Subjective:      Patient ID: Kassandra Ybarra is a 68 y o  male  Kassandra Ybarra is a 68year old male who presents today per referral by Dr Rodolfo Chandra regarding possible right inguinal hernia  Patient reports that he noticed some bulging on his left groin  Patient states that beside the bulging he does not have any other symptoms  Patient is currently taking Xarelto   He CLINICAL NUTRITION SERVICES - BRIEF NOTE    Received provider consult for nutrition education with comments post op cardiovascular surgery (automatic consult on post-op order set). S/p AAA repair and CABG on 6/5. Offered nutrition education, but pt very sleepy this AM and would like RD to attempt another time. Pt's  states Marilia follows a fairly healthy diet at home and declined handouts at this time (but may want them at a later date), Further nutrition education to be completed as able/appropriate.    RD will follow per LOS protocol or if re-consulted.     Shameka Parra, DUSTIN, LD, [facility]  CVICU Dietitian  Pager: [phone]     also mentioned that he is up to date with screening colonoscopy, he does have history of colonic polyps  Patient states that he had appendectomy when he was in college  Patient has a history of atrial fibrillation, mitral valve disorder and malignant melanoma  The following portions of the patient's history were reviewed and updated as appropriate: allergies, current medications, past family history, past medical history, past social history, past surgical history and problem list     Review of Systems   Constitutional: Negative  HENT: Negative  Eyes: Negative  Respiratory: Negative  Cardiovascular: Negative  Gastrointestinal:        Bulge on the right groin    Endocrine: Negative  Genitourinary: Negative  Musculoskeletal: Negative  Skin: Negative  Allergic/Immunologic: Negative  Neurological: Negative  Hematological: Negative  Psychiatric/Behavioral: Negative  Objective:      /89   Pulse 76   Temp 98 1 °F (36 7 °C) (Tympanic)   Resp 16   Ht 6' 1" (1 854 m)   Wt 87 7 kg (193 lb 6 4 oz)   BMI 25 52 kg/m²          Physical Exam   Constitutional: He is oriented to person, place, and time  He appears well-developed and well-nourished  No distress  HENT:   Head: Normocephalic and atraumatic  Right Ear: External ear normal    Left Ear: External ear normal    Nose: Nose normal    Mouth/Throat: Oropharynx is clear and moist    Eyes: Pupils are equal, round, and reactive to light  Conjunctivae and EOM are normal    Neck: Normal range of motion  Neck supple  No JVD present  No tracheal deviation present  No thyromegaly present  Cardiovascular: Normal rate, regular rhythm, normal heart sounds and intact distal pulses  Exam reveals no gallop and no friction rub  No murmur heard  Pulmonary/Chest: Effort normal and breath sounds normal  No stridor  No respiratory distress  He has no wheezes  He has no rales  He exhibits no tenderness  Abdominal: Soft  Bowel sounds are normal  He exhibits no distension and no mass  There is no tenderness  There is no rebound and no guarding  A hernia (Bilateral ) is present  Rectus diastasis    Musculoskeletal: Normal range of motion  He exhibits no edema, tenderness or deformity  Lymphadenopathy:     He has no cervical adenopathy  Neurological: He is alert and oriented to person, place, and time  No cranial nerve deficit  Coordination normal    Skin: Skin is warm and dry  No rash noted  He is not diaphoretic  No erythema  No pallor  Multiple pigmented nevi, seborrheas, hemangiomas of the back  Small cyst of the back  Psychiatric: He has a normal mood and affect  His behavior is normal  Judgment and thought content normal    Nursing note and vitals reviewed  By signing my name below, Munira Trujillothers, attest that this documentation has been prepared under the direction and in the presence of Mika Bowden MD  Electronically Signed: Rosanna Lua  12/23/19  Paul Barron, personally performed the services described in this documentation  All medical record entries made by the scribe were at my direction and in my presence  I have reviewed the chart and discharge instructions and agree that the record reflects my personal performance and is accurate and complete  Mika Bowden MD  12/23/19

## 2019-12-23 NOTE — PROGRESS NOTES
Assessment/Plan:  Melvi Smith is a 68year old male who presents today per referral by Dr Sofia Lorenz regarding possible right inguinal hernia  Physical exam revealed bilateral inguinal hernias  Explained the etiology of these hernias  Discussed the risks, benefits and alternatives to a laparoscopic bilateral inguinal hernias repair  Explained the procedure as well as pre- and post- procedural protocols  Lifting restrictions of no greater than 15 pounds and no strenuous activity for the first two weeks after the surgery  Lifting restrictions of no greater than 25 pounds for weeks 3-4  Encourage light cardiovascular activity like walking after the procedure  He should not drive or return to work while he is taking narcotics  The office is going to schedule him for the procedure  He knows to contact the office if any questions or concerns arise  Skin- Physical exam revealed multiple pigmented nevi, seborrheas, hemangiomas of the back  Small cyst of the back  Explained the etiology of these skin lesions and that they are benign at this time  His PCP should monitor them annually  Rectus diastasis- Physical exam revealed rectus diastasis  Explained the etiology of rectus diastasis and that this is not a hernia however, the risks of getting a hernia on this area are higher  No problem-specific Assessment & Plan notes found for this encounter  Diagnoses and all orders for this visit:    Unilateral inguinal hernia without obstruction or gangrene, recurrence not specified  -     Ambulatory referral to General Surgery          Subjective:      Patient ID: Melvi Smith is a 68 y o  male  Melvi Smith is a 68year old male who presents today per referral by Dr Sofia Lorenz regarding possible right inguinal hernia  Patient reports that he noticed some bulging on his left groin  Patient states that beside the bulging he does not have any other symptoms  Patient is currently taking Xarelto   He also mentioned that he is up to date with screening colonoscopy, he does have history of colonic polyps  Patient states that he had appendectomy when he was in college  Patient has a history of atrial fibrillation, mitral valve disorder and malignant melanoma  The following portions of the patient's history were reviewed and updated as appropriate: allergies, current medications, past family history, past medical history, past social history, past surgical history and problem list     Review of Systems   Constitutional: Negative  HENT: Negative  Eyes: Negative  Respiratory: Negative  Cardiovascular: Negative  Gastrointestinal:        Bulge on the right groin    Endocrine: Negative  Genitourinary: Negative  Musculoskeletal: Negative  Skin: Negative  Allergic/Immunologic: Negative  Neurological: Negative  Hematological: Negative  Psychiatric/Behavioral: Negative  Objective:      /89   Pulse 76   Temp 98 1 °F (36 7 °C) (Tympanic)   Resp 16   Ht 6' 1" (1 854 m)   Wt 87 7 kg (193 lb 6 4 oz)   BMI 25 52 kg/m²          Physical Exam   Constitutional: He is oriented to person, place, and time  He appears well-developed and well-nourished  No distress  HENT:   Head: Normocephalic and atraumatic  Right Ear: External ear normal    Left Ear: External ear normal    Nose: Nose normal    Mouth/Throat: Oropharynx is clear and moist    Eyes: Pupils are equal, round, and reactive to light  Conjunctivae and EOM are normal    Neck: Normal range of motion  Neck supple  No JVD present  No tracheal deviation present  No thyromegaly present  Cardiovascular: Normal rate, regular rhythm, normal heart sounds and intact distal pulses  Exam reveals no gallop and no friction rub  No murmur heard  Pulmonary/Chest: Effort normal and breath sounds normal  No stridor  No respiratory distress  He has no wheezes  He has no rales  He exhibits no tenderness  Abdominal: Soft  Bowel sounds are normal  He exhibits no distension and no mass  There is no tenderness  There is no rebound and no guarding  A hernia (Bilateral ) is present  Rectus diastasis    Musculoskeletal: Normal range of motion  He exhibits no edema, tenderness or deformity  Lymphadenopathy:     He has no cervical adenopathy  Neurological: He is alert and oriented to person, place, and time  No cranial nerve deficit  Coordination normal    Skin: Skin is warm and dry  No rash noted  He is not diaphoretic  No erythema  No pallor  Multiple pigmented nevi, seborrheas, hemangiomas of the back  Small cyst of the back  Psychiatric: He has a normal mood and affect  His behavior is normal  Judgment and thought content normal    Nursing note and vitals reviewed  By signing my name below, Ashly Side, attest that this documentation has been prepared under the direction and in the presence of Erick Allan MD  Electronically Signed: Rosanna Arreola  12/23/19  Bernice Holman, personally performed the services described in this documentation  All medical record entries made by the finesseibadia were at my direction and in my presence  I have reviewed the chart and discharge instructions and agree that the record reflects my personal performance and is accurate and complete  Erick Allan MD  12/23/19

## 2019-12-31 LAB
APPEARANCE UR: CLEAR
BACTERIA URNS QL MICRO: ABNORMAL
BASOPHILS # BLD AUTO: 0 X10E3/UL (ref 0–0.2)
BASOPHILS NFR BLD AUTO: 0 %
BILIRUB UR QL STRIP: NEGATIVE
BUN SERPL-MCNC: 28 MG/DL (ref 8–27)
BUN/CREAT SERPL: 27 (ref 10–24)
CALCIUM SERPL-MCNC: 9.1 MG/DL (ref 8.6–10.2)
CHLORIDE SERPL-SCNC: 103 MMOL/L (ref 96–106)
CO2 SERPL-SCNC: 25 MMOL/L (ref 20–29)
COLOR UR: YELLOW
CREAT SERPL-MCNC: 1.03 MG/DL (ref 0.76–1.27)
EOSINOPHIL # BLD AUTO: 0.1 X10E3/UL (ref 0–0.4)
EOSINOPHIL NFR BLD AUTO: 2 %
EPI CELLS #/AREA URNS HPF: ABNORMAL /HPF (ref 0–10)
ERYTHROCYTE [DISTWIDTH] IN BLOOD BY AUTOMATED COUNT: 13.8 % (ref 12.3–15.4)
EST. AVERAGE GLUCOSE BLD GHB EST-MCNC: 111 MG/DL
GLUCOSE SERPL-MCNC: 87 MG/DL (ref 65–99)
GLUCOSE UR QL: NEGATIVE
HBA1C MFR BLD: 5.5 % (ref 4.8–5.6)
HCT VFR BLD AUTO: 43.6 % (ref 37.5–51)
HGB BLD-MCNC: 15.2 G/DL (ref 13–17.7)
HGB UR QL STRIP: NEGATIVE
IMM GRANULOCYTES # BLD: 0 X10E3/UL (ref 0–0.1)
IMM GRANULOCYTES NFR BLD: 0 %
KETONES UR QL STRIP: NEGATIVE
LEUKOCYTE ESTERASE UR QL STRIP: ABNORMAL
LYMPHOCYTES # BLD AUTO: 1.8 X10E3/UL (ref 0.7–3.1)
LYMPHOCYTES NFR BLD AUTO: 33 %
MCH RBC QN AUTO: 32.2 PG (ref 26.6–33)
MCHC RBC AUTO-ENTMCNC: 34.9 G/DL (ref 31.5–35.7)
MCV RBC AUTO: 92 FL (ref 79–97)
MICRO URNS: ABNORMAL
MONOCYTES # BLD AUTO: 0.4 X10E3/UL (ref 0.1–0.9)
MONOCYTES NFR BLD AUTO: 8 %
MUCOUS THREADS URNS QL MICRO: PRESENT
NEUTROPHILS # BLD AUTO: 3.1 X10E3/UL (ref 1.4–7)
NEUTROPHILS NFR BLD AUTO: 57 %
NITRITE UR QL STRIP: POSITIVE
PH UR STRIP: 8 [PH] (ref 5–7.5)
PLATELET # BLD AUTO: 284 X10E3/UL (ref 150–450)
POTASSIUM SERPL-SCNC: 4.4 MMOL/L (ref 3.5–5.2)
PROT UR QL STRIP: ABNORMAL
RBC # BLD AUTO: 4.72 X10E6/UL (ref 4.14–5.8)
RBC #/AREA URNS HPF: ABNORMAL /HPF (ref 0–2)
SL AMB EGFR AFRICAN AMERICAN: 84 ML/MIN/1.73
SL AMB EGFR NON AFRICAN AMERICAN: 73 ML/MIN/1.73
SODIUM SERPL-SCNC: 141 MMOL/L (ref 134–144)
SP GR UR: 1.02 (ref 1–1.03)
UROBILINOGEN UR STRIP-ACNC: 1 MG/DL (ref 0.2–1)
WBC # BLD AUTO: 5.3 X10E3/UL (ref 3.4–10.8)
WBC #/AREA URNS HPF: ABNORMAL /HPF (ref 0–5)

## 2020-01-03 RX ORDER — CEFAZOLIN SODIUM 2 G/50ML
2000 SOLUTION INTRAVENOUS ONCE
Status: CANCELLED | OUTPATIENT
Start: 2020-01-14 | End: 2020-01-14

## 2020-01-03 RX ORDER — SODIUM CHLORIDE, SODIUM LACTATE, POTASSIUM CHLORIDE, CALCIUM CHLORIDE 600; 310; 30; 20 MG/100ML; MG/100ML; MG/100ML; MG/100ML
125 INJECTION, SOLUTION INTRAVENOUS CONTINUOUS
Status: CANCELLED | OUTPATIENT
Start: 2020-01-14

## 2020-01-08 NOTE — PRE-PROCEDURE INSTRUCTIONS
Pre-Surgery Instructions:   Medication Instructions    hydrochlorothiazide (HYDRODIURIL) 12 5 mg tablet Instructed patient per Anesthesia Guidelines   metoprolol succinate (TOPROL-XL) 50 mg 24 hr tablet Instructed patient per Anesthesia Guidelines   pravastatin (PRAVACHOL) 40 mg tablet Instructed patient per Anesthesia Guidelines   XARELTO 20 MG tablet Patient was instructed by Physician and understands   zolpidem (AMBIEN) 5 mg tablet Instructed patient per Anesthesia Guidelines  Before your operation, you play an important role in decreasing your risk for infection by washing with special antiseptic soap  This is an effective way to reduce bacteria on the skin which may help to prevent infections at the surgical site  Please read the following directions in advance  1  In the week before your operation purchase a 4 ounce bottle of antiseptic soap containing chlorhexidine gluconate 4%  Some brand names include: Aplicare, Endure, and Hibiclens  The cost is usually less than $5 00  · For your convenience, the [employer] carries the soap  · It may also be available at your doctor's office or pre-admission testing center, and at most retail pharmacies  · If you are allergic or sensitive to soaps containing chlorhexidine gluconate (CHG), please let your doctor know so another antiseptic soap can be suggested  · CHG antiseptic soap is for external use only  2      The day before your operation follow these directions carefully to get ready  · Place clean lines (sheets) on your bed; you should sleep on clean sheets after your evening shower  · Get clean towels and washcloths ready - you need enough for 2 showers  · Set aside clean underwear, pajamas, and clothing to wear after the shower  Reminders:  · DO NOT use any other soap or body rinse on your skin during or after the antiseptic showers    · DO NOT use lotion , powder, deodorant, or perfume/aftershave of any kind on your skin after your antiseptic shower  · DO NOT shave any body parts in the 24 hours/the day before your operation  · DO NOT get the antiseptic soap in your eyes, ears, nose, mouth, or vaginal area  3      You will need to shower the night before AND the morning of your Surgery  Shower 1:  · The evening before your operation, take the fist shower  · First, shampoo your hair with regular shampoo and rinse it completely before you use the anitseptic soap  After washing and rinsing your hair, rinse your body  · Next, use a clean wash cloth to apply the antiseptic soap and wash your body from the neck down to your toes using 1/2 bottle of the antiseptic soap  You will use the other 1/2 bottle for the second shower  · Clean the area where your incision will be; later this area well for about 2 minutes  · If you ar having head or neck surgery, wash areas with the antiseptic soap  · Rinse yourself completely with running water  · Use a clean towel to dry off  · Wear clean underwear and clothing/pajamas  Shower 2:  · The Morning of your operation, take the second shower following the same steps as Shower 1 using the second 1/2 of the bottle of antiseptic soap  · Use clean cloths and towels to was and dry yourself off  · Wear clean underwear and clothing

## 2020-01-14 ENCOUNTER — ANESTHESIA EVENT (OUTPATIENT)
Dept: PERIOP | Facility: HOSPITAL | Age: 70
End: 2020-01-14
Payer: COMMERCIAL

## 2020-01-14 ENCOUNTER — HOSPITAL ENCOUNTER (OUTPATIENT)
Facility: HOSPITAL | Age: 70
Setting detail: OUTPATIENT SURGERY
Discharge: HOME | End: 2020-01-14
Attending: SURGERY | Admitting: SURGERY
Payer: COMMERCIAL

## 2020-01-14 ENCOUNTER — ANESTHESIA (OUTPATIENT)
Dept: PERIOP | Facility: HOSPITAL | Age: 70
End: 2020-01-14
Payer: COMMERCIAL

## 2020-01-14 VITALS
WEIGHT: 190.8 LBS | OXYGEN SATURATION: 94 % | RESPIRATION RATE: 16 BRPM | HEIGHT: 73 IN | BODY MASS INDEX: 25.29 KG/M2 | DIASTOLIC BLOOD PRESSURE: 79 MMHG | SYSTOLIC BLOOD PRESSURE: 123 MMHG | TEMPERATURE: 97.9 F | HEART RATE: 73 BPM

## 2020-01-14 DIAGNOSIS — Z98.890 S/P HERNIA REPAIR: Primary | ICD-10-CM

## 2020-01-14 DIAGNOSIS — Z87.19 S/P HERNIA REPAIR: Primary | ICD-10-CM

## 2020-01-14 PROBLEM — K40.20 NON-RECURRENT BILATERAL INGUINAL HERNIA WITHOUT OBSTRUCTION OR GANGRENE: Status: RESOLVED | Noted: 2019-12-23 | Resolved: 2020-01-14

## 2020-01-14 PROCEDURE — 49650 LAP ING HERNIA REPAIR INIT: CPT | Performed by: PHYSICIAN ASSISTANT

## 2020-01-14 PROCEDURE — C1781 MESH (IMPLANTABLE): HCPCS | Performed by: SURGERY

## 2020-01-14 PROCEDURE — 49650 LAP ING HERNIA REPAIR INIT: CPT | Performed by: SURGERY

## 2020-01-14 DEVICE — BARD 3DMAX MESH LEFT LARGE
Type: IMPLANTABLE DEVICE | Site: INGUINAL | Status: FUNCTIONAL
Brand: BARD 3DMAX MESH

## 2020-01-14 DEVICE — BARD 3DMAX MESH RIGHT LARGE
Type: IMPLANTABLE DEVICE | Site: INGUINAL | Status: FUNCTIONAL
Brand: BARD 3DMAX MESH

## 2020-01-14 RX ORDER — PROPOFOL 10 MG/ML
INJECTION, EMULSION INTRAVENOUS AS NEEDED
Status: DISCONTINUED | OUTPATIENT
Start: 2020-01-14 | End: 2020-01-14 | Stop reason: SURG

## 2020-01-14 RX ORDER — SODIUM CHLORIDE, SODIUM LACTATE, POTASSIUM CHLORIDE, CALCIUM CHLORIDE 600; 310; 30; 20 MG/100ML; MG/100ML; MG/100ML; MG/100ML
125 INJECTION, SOLUTION INTRAVENOUS CONTINUOUS
Status: DISCONTINUED | OUTPATIENT
Start: 2020-01-14 | End: 2020-01-14 | Stop reason: HOSPADM

## 2020-01-14 RX ORDER — METOCLOPRAMIDE HYDROCHLORIDE 5 MG/ML
10 INJECTION INTRAMUSCULAR; INTRAVENOUS ONCE
Status: DISCONTINUED | OUTPATIENT
Start: 2020-01-14 | End: 2020-01-14 | Stop reason: HOSPADM

## 2020-01-14 RX ORDER — HYDROCODONE BITARTRATE AND ACETAMINOPHEN 5; 325 MG/1; MG/1
2 TABLET ORAL EVERY 4 HOURS PRN
Status: DISCONTINUED | OUTPATIENT
Start: 2020-01-14 | End: 2020-01-14 | Stop reason: HOSPADM

## 2020-01-14 RX ORDER — HYDROMORPHONE HCL/PF 1 MG/ML
0.5 SYRINGE (ML) INJECTION
Status: DISCONTINUED | OUTPATIENT
Start: 2020-01-14 | End: 2020-01-14 | Stop reason: HOSPADM

## 2020-01-14 RX ORDER — GLYCOPYRROLATE 0.2 MG/ML
INJECTION INTRAMUSCULAR; INTRAVENOUS AS NEEDED
Status: DISCONTINUED | OUTPATIENT
Start: 2020-01-14 | End: 2020-01-14 | Stop reason: SURG

## 2020-01-14 RX ORDER — ONDANSETRON 2 MG/ML
4 INJECTION INTRAMUSCULAR; INTRAVENOUS EVERY 6 HOURS PRN
Status: DISCONTINUED | OUTPATIENT
Start: 2020-01-14 | End: 2020-01-14 | Stop reason: HOSPADM

## 2020-01-14 RX ORDER — ONDANSETRON 2 MG/ML
INJECTION INTRAMUSCULAR; INTRAVENOUS AS NEEDED
Status: DISCONTINUED | OUTPATIENT
Start: 2020-01-14 | End: 2020-01-14 | Stop reason: SURG

## 2020-01-14 RX ORDER — FENTANYL CITRATE 50 UG/ML
INJECTION, SOLUTION INTRAMUSCULAR; INTRAVENOUS AS NEEDED
Status: DISCONTINUED | OUTPATIENT
Start: 2020-01-14 | End: 2020-01-14 | Stop reason: SURG

## 2020-01-14 RX ORDER — MIDAZOLAM HYDROCHLORIDE 2 MG/2ML
INJECTION, SOLUTION INTRAMUSCULAR; INTRAVENOUS AS NEEDED
Status: DISCONTINUED | OUTPATIENT
Start: 2020-01-14 | End: 2020-01-14 | Stop reason: SURG

## 2020-01-14 RX ORDER — KETOROLAC TROMETHAMINE 30 MG/ML
INJECTION, SOLUTION INTRAMUSCULAR; INTRAVENOUS AS NEEDED
Status: DISCONTINUED | OUTPATIENT
Start: 2020-01-14 | End: 2020-01-14 | Stop reason: SURG

## 2020-01-14 RX ORDER — CEFAZOLIN SODIUM 2 G/50ML
2000 SOLUTION INTRAVENOUS ONCE
Status: COMPLETED | OUTPATIENT
Start: 2020-01-14 | End: 2020-01-14

## 2020-01-14 RX ORDER — NEOSTIGMINE METHYLSULFATE 1 MG/ML
INJECTION INTRAVENOUS AS NEEDED
Status: DISCONTINUED | OUTPATIENT
Start: 2020-01-14 | End: 2020-01-14 | Stop reason: SURG

## 2020-01-14 RX ORDER — ROCURONIUM BROMIDE 10 MG/ML
INJECTION, SOLUTION INTRAVENOUS AS NEEDED
Status: DISCONTINUED | OUTPATIENT
Start: 2020-01-14 | End: 2020-01-14 | Stop reason: SURG

## 2020-01-14 RX ORDER — ACETAMINOPHEN 325 MG/1
650 TABLET ORAL EVERY 6 HOURS PRN
Status: DISCONTINUED | OUTPATIENT
Start: 2020-01-14 | End: 2020-01-14 | Stop reason: HOSPADM

## 2020-01-14 RX ORDER — HYDROCODONE BITARTRATE AND ACETAMINOPHEN 5; 325 MG/1; MG/1
1-2 TABLET ORAL EVERY 6 HOURS PRN
Qty: 24 TABLET | Refills: 0 | Status: SHIPPED | OUTPATIENT
Start: 2020-01-14 | End: 2020-01-17

## 2020-01-14 RX ORDER — FENTANYL CITRATE/PF 50 MCG/ML
25 SYRINGE (ML) INJECTION
Status: DISCONTINUED | OUTPATIENT
Start: 2020-01-14 | End: 2020-01-14 | Stop reason: HOSPADM

## 2020-01-14 RX ORDER — ONDANSETRON 2 MG/ML
4 INJECTION INTRAMUSCULAR; INTRAVENOUS ONCE
Status: DISCONTINUED | OUTPATIENT
Start: 2020-01-14 | End: 2020-01-14 | Stop reason: HOSPADM

## 2020-01-14 RX ORDER — DEXAMETHASONE SODIUM PHOSPHATE 10 MG/ML
INJECTION, SOLUTION INTRAMUSCULAR; INTRAVENOUS AS NEEDED
Status: DISCONTINUED | OUTPATIENT
Start: 2020-01-14 | End: 2020-01-14 | Stop reason: SURG

## 2020-01-14 RX ADMIN — DEXAMETHASONE SODIUM PHOSPHATE 4 MG: 10 INJECTION, SOLUTION INTRAMUSCULAR; INTRAVENOUS at 10:43

## 2020-01-14 RX ADMIN — ROCURONIUM BROMIDE 10 MG: 10 INJECTION, SOLUTION INTRAVENOUS at 11:18

## 2020-01-14 RX ADMIN — NEOSTIGMINE METHYLSULFATE 3 MG: 1 INJECTION INTRAVENOUS at 11:37

## 2020-01-14 RX ADMIN — MIDAZOLAM 2 MG: 1 INJECTION INTRAMUSCULAR; INTRAVENOUS at 10:27

## 2020-01-14 RX ADMIN — PROPOFOL 200 MG: 10 INJECTION, EMULSION INTRAVENOUS at 10:32

## 2020-01-14 RX ADMIN — FENTANYL CITRATE 100 MCG: 50 INJECTION, SOLUTION INTRAMUSCULAR; INTRAVENOUS at 10:29

## 2020-01-14 RX ADMIN — ROCURONIUM BROMIDE 50 MG: 10 INJECTION, SOLUTION INTRAVENOUS at 10:32

## 2020-01-14 RX ADMIN — ONDANSETRON 4 MG: 2 INJECTION INTRAMUSCULAR; INTRAVENOUS at 11:33

## 2020-01-14 RX ADMIN — GLYCOPYRROLATE 0.6 MG: 0.2 INJECTION, SOLUTION INTRAMUSCULAR; INTRAVENOUS at 11:37

## 2020-01-14 RX ADMIN — KETOROLAC TROMETHAMINE 30 MG: 30 INJECTION, SOLUTION INTRAMUSCULAR; INTRAVENOUS at 11:31

## 2020-01-14 RX ADMIN — CEFAZOLIN SODIUM 2000 MG: 2 SOLUTION INTRAVENOUS at 10:28

## 2020-01-14 RX ADMIN — SODIUM CHLORIDE, SODIUM LACTATE, POTASSIUM CHLORIDE, AND CALCIUM CHLORIDE 125 ML/HR: .6; .31; .03; .02 INJECTION, SOLUTION INTRAVENOUS at 09:50

## 2020-01-14 NOTE — ANESTHESIA POSTPROCEDURE EVALUATION
Post-Op Assessment Note    CV Status:  Stable  Pain Score: 0    Pain management: adequate     Mental Status:  Sleepy   Hydration Status:  Stable   PONV Controlled:  Controlled   Airway Patency:  Patent   Post Op Vitals Reviewed: Yes      Staff: CRNA           BP      Temp      Pulse     Resp      SpO2

## 2020-01-14 NOTE — INTERIM OP NOTE
REPAIR HERNIA INGUINAL, LAPAROSCOPIC  Postoperative Note  PATIENT NAME: Ivonne Harrison  : 1950  MRN: [id number]  [facility] OR ROOM 02    Surgery Date: 2020    Preop Diagnosis:  Non-recurrent bilateral inguinal hernia without obstruction or gangrene [K40 20]    Post-Op Diagnosis Codes:     * Non-recurrent bilateral inguinal hernia without obstruction or gangrene [K40 20]    Procedure(s) (LRB):  REPAIR HERNIA INGUINAL, LAPAROSCOPIC (Bilateral)    Surgeon(s) and Role:     * Ayo Bland MD - Primary     * Roger Newman PA-C - Assisting    Specimens:  * No specimens in log *    Estimated Blood Loss:   5 mL    Anesthesia Type:   General ETA    Findings:    as above  Complications:   None      Hernia Surgery Operative Note    Name: Ivonne Harrison    Gender: male    Age: 68 y o  Race:     BMI: Body mass index is 25 17 kg/m²  DIAGNOSIS:   1   S/P hernia repair  HYDROcodone-acetaminophen (NORCO) 5-325 mg per tablet       Diabetes Mellitis: No    Coronary Heart Disease: No    Cancer: No    Steroid Use: No    Tobacco use: No   Last used: never smoked       Alcohol use: Yes Minimal     Location of Hernia: right indirect  Length:1 5 cm  Width:1 5 cm  Primary: Yes  Recurrent: No   Number of recurrences:    Access: Laparoscope    Component Separation: No    Mesh:   Yes -  Type: Synthetic   large right 3D MAX    Location of Hernia: left indirect  Length:1 5 cm  Width:1 5 cm  Primary: Yes  Recurrent: No   Number of recurrences:    Access: Laparoscope    Component Separation: No    Mesh:   Yes -  Type: Synthetic   Large left 3D MAX    Operative Time: 43 min         SIGNATURE: Roger Newman PA-C   DATE: 2020   TIME: 11:48 AM

## 2020-01-14 NOTE — ANESTHESIA PREPROCEDURE EVALUATION
Review of Systems/Medical History  Patient summary reviewed  Chart reviewed  History of anesthetic complications PONV    Cardiovascular  Hyperlipidemia, Hypertension , Valve repair mitral valve  repair, Dysrhythmias , atrial fibrillation,    Pulmonary  Negative pulmonary ROS        GI/Hepatic  Negative GI/hepatic ROS          Negative  ROS        Endo/Other  Negative endo/other ROS      GYN  Negative gynecology ROS          Hematology  Negative hematology ROS      Musculoskeletal  Negative musculoskeletal ROS        Neurology  Negative neurology ROS      Psychology   Negative psychology ROS              Physical Exam    Airway    Mallampati score: III  TM Distance: <3 FB  Neck ROM: full     Dental   No notable dental hx     Cardiovascular  Rhythm: regular, Rate: normal, Cardiovascular exam normal    Pulmonary  Pulmonary exam normal Breath sounds clear to auscultation,     Other Findings        Anesthesia Plan  ASA Score- 3     Anesthesia Type- general with ASA Monitors  Additional Monitors:   Airway Plan: ETT  Plan Factors-    Induction- intravenous  Postoperative Plan- Plan for postoperative opioid use  Informed Consent- Anesthetic plan and risks discussed with patient  I personally reviewed this patient with the CRNA  Discussed and agreed on the Anesthesia Plan with the CRNA  Cinda Holland

## 2020-01-14 NOTE — DISCHARGE INSTRUCTIONS
Jennifer Quispe Instructions                  Dr Ayo GARCÍA     1  General: Pink Kelp will feel pulling sensations around the wound or funny aches and pains around the incisions  This is normal  Even minor surgery is a change in your body and this is your bodys way of reaction to it  If you have had abdominal surgery, it may help to support the incision with a small pillow or blanket for comfort when moving or coughing  2  Wound care:      Glue - Leave glue alone, it will fall off on its own, no need for an additional dressings    3  Water: You may shower over the wounds  Do not bathe or use a pool or hot tub until cleared by the physician  You may shower right over the incisions and rinse wound with soapy water but do not scrub incisions  Pat dry when you are done  4  Activity: You may go up and down stairs, walk as much as you are comfortable, but walk at least 3 times each day  If you have had abdominal surgery, do not lift anything heavier than 15 pounds for at least 2 weeks, unless cleared by the doctor  5  Diet: You may resume a regular diet  If you had a same-day surgery or overnight stay surgery, you may wish to eat lightly for a few days: soups, crackers, and sandwiches  You may resume a regular diet when ready  6  Medications: Resume all of your previous medications, unless told otherwise by the doctor  Avoid aspirin products for 2-3 days after the date of surgery  You may resume your Xarelto on Friday January 17th  You may, at that time, began to take them again  Tylenol and Ibuprofen are always fine, unless you are taking any narcotic pain medication containing Tylenol (such as Percocet, Darvocet, Vicodin, or anything containing acetaminophen)  Do not take Tylenol if you're taking these medications  You do not need to take the narcotic pain medications unless you are having significant pain and discomfort  7  Driving:  You will need someone to drive you home on the day of surgery  Do not drive or make any important decisions while on narcotic pain medication or 24 hours and after anesthesia or sedation for surgery  Generally, you may drive when your off all narcotic pain medications  8  Upset Stomach: You may take Maalox, Tums, or similar items for an upset stomach  If your narcotic pain medication causes an upset stomach, do not take it on an empty stomach  Try taking it with at least some crackers or toast      9  Constipation: Patients often experienced constipation after surgery  You may take over-the-counter medication for this, such as Metamucil, Senokot, Dulcolax, milk of magnesia, etc  You may take a suppository unless you have had anorectal surgery such as a procedure on your hemorrhoids  If you experience significant nausea or vomiting after abdominal surgery, call the office before trying any of these medications  10  Call the office: If you are experiencing any of the following, fevers above 101 5°, significant nausea or vomiting, if the wound develops drainage and/or is excessive redness around the wound, or if you have significant diarrhea or other worsening symptoms  11  Pain: You may be given a prescription for pain  This will be given to the hospital, the day of surgery  12  Sexual Activity: You may resume sexual activity when you feel ready and comfortable and your incision is sealed and healed without apparent infection risk  13  Urination: If you haven't urinated in 6 hours, go directly to the ER for evaluation for urinary retention  14  Follow-up in 2 weeks          [facility]  Phone: [phone]

## 2020-01-15 NOTE — OP NOTE
Inguinal Hernia, Laparocopic, Procedure Note    Name: David Edwards   : 1950  MRN: [id number]  Date: 2020    Indications: The patient presented with a history of a bilateral, reducible inguinal hernia  Pre-operative Diagnosis: bilateral reducible inguinal hernia    Post-operative Diagnosis: bilateral reducible direct and/or indirect inguinal hernia    Procedure: Laparoscopic repair of bilateral inguinal hernia with mesh, Laparoscopic assisted bilateral TAP block    Surgeon: Dawit Wood MD  Assistants: Oscar Newman PA-C, no qualified resident available  PA was medically necessary for the surgical safety of the case, including suturing, retraction, hemostasis  Anesthesia: General endotracheal anesthesia    Procedure Details   The patient was seen again in the Holding Room  The risks, benefits, complications, treatment options, and expected outcomes were discussed with the patient  The possibilities of reaction to medication, pulmonary aspiration, perforation of viscus, bleeding, postoperative short or long term nerve entrapment causing pain,recurrent infection, the need for additional procedures, and development of a complication requiring transfusion or further operation were discussed with the patient and/or family  There was concurrence with the proposed plan, and informed consent was obtained  The site of surgery was properly noted/marked  The patient was taken to the Operating Room, identified as David Edwards and the procedure verified as hernia repair  A Time Out was held and the above information confirmed  The patient was prepped and draped in a sterile fashion  A timeout was again performed  Local anesthesia was used in the incision  An umbilical incision was made  Dissection carried out to the fascia which was grasped with Kocher's and elevated  The fascia was incised and 0-Vicryl stay sutures were placed   A luci trocar was inserted into the abdomen and the abdomen was insufflated to appropriate pressure  Two additional 5 mm trocars were placed lateral to rectus muscle approximately at the level of the umbilicus  At this point the patient was placed into Trendelenburg position and noted to have bilateral inguinal hernia   The peritoneum was incised from the medial umbilical fold out laterally past the internal ring on the right  Using peanut the superior flap was dissected  Next the direct space was mobilized by exposing Steve's ligament all the way along its length to the pubic tubercle  If a direct hernia defect was seen this was dissected and reduced  If there was indirect hernia sac this was carefully mobilized off the cord structures with care to avoid injury to the gonadal vessels or spermatic cord  The remainder of the inferior flap was created  At this point Bard 3-D max mesh was selected and placed into the preperitoneal space  The mesh was secured inferiorly at Steve's  Medially at the pubic tubercle, and laterally at the anterior abdominal wall  Of note no clips were placed lateral to the gonadal vessels or inferior to the iliopubic tract  The peritoneum was closed using a running V-lock suture  The peritoneum was incised from the medial umbilical fold out laterally past the internal ring on the left  Using peanut the superior flap was dissected  Next the direct space was mobilized by exposing Steve's ligament all the way along its length to the pubic tubercle  If a direct hernia defect was seen this was dissected and reduced  If there was indirect hernia sac this was carefully mobilized off the cord structures with care to avoid injury to the gonadal vessels or spermatic cord  The remainder of the inferior flap was created  At this point Bard 3-D max mesh was selected and placed into the preperitoneal space  The mesh was secured inferiorly at Steve's  Medially at the pubic tubercle, and laterally at the anterior abdominal wall   Of note no clips were placed lateral to the gonadal vessels or inferior to the iliopubic tract  The peritoneum was closed using a running V-lock suture  The umbilical trocor site was closed with an additional 0-vicryl and tying down the stay sutures   The wound was closed in multiple layers using 3-0 Vicryl sutures and the skin of all ports was closed using a 4-0 Monocryl subcuticular stitch  The wound was dressed with Histacryl  The patient was anatomically correct at the end of the procedure  The patient tolerated the procedure in good condition  Instrument, sponge, and needle counts were correct prior to closure and at the conclusion of the case  This text is generated with voice recognition software  There may be translation, syntax,  or grammatical errors  If you have any questions, please contact the dictating provider  Findings: bilateral reducible direct and/or indirect inguinal hernia    Estimated Blood Loss: Minimal       Specimens: All specimens were sent for pathology  * No orders in the log *         Complications: None; patient tolerated the procedure well             Disposition: PACU            Condition: stable    Signature:   Michael Rabago MD  Date: 1/15/2020 Time: 1:51 PM

## 2020-01-27 ENCOUNTER — OFFICE VISIT (OUTPATIENT)
Dept: SURGERY | Facility: HOSPITAL | Age: 70
End: 2020-01-27

## 2020-01-27 VITALS — HEIGHT: 73 IN | BODY MASS INDEX: 25.08 KG/M2 | WEIGHT: 189.2 LBS | TEMPERATURE: 98.2 F

## 2020-01-27 DIAGNOSIS — Z09 POSTOP CHECK: Primary | ICD-10-CM

## 2020-01-27 PROCEDURE — 4040F PNEUMOC VAC/ADMIN/RCVD: CPT | Performed by: SURGERY

## 2020-01-27 PROCEDURE — 3078F DIAST BP <80 MM HG: CPT | Performed by: SURGERY

## 2020-01-27 PROCEDURE — 3074F SYST BP LT 130 MM HG: CPT | Performed by: SURGERY

## 2020-01-27 PROCEDURE — 99024 POSTOP FOLLOW-UP VISIT: CPT | Performed by: SURGERY

## 2020-01-27 PROCEDURE — 1160F RVW MEDS BY RX/DR IN RCRD: CPT | Performed by: SURGERY

## 2020-01-27 NOTE — PROGRESS NOTES
Assessment/Plan: Abiel Roberts is a 68year old male who presents today in post operative state status post laparoscopic bilateral inguinal hernias repair performed on 1/14/20  Patient is doing well after the surgery  He still has lifting restrictions of no greater than 25 pounds for two more weeks  Light impact cardiovascular activities are permissible at this time  He should avoid core exercises and heavy weight lifting for 4-6 more weeks  He may follow up as needed  He knows to contact the office if any questions or concerns arise  No problem-specific Assessment & Plan notes found for this encounter  There are no diagnoses linked to this encounter  Subjective:      Patient ID: Kanu Collazo is a 68 y o  male  Abiel Roberts is a 68year old male who presents today in post operative state status post laparoscopic bilateral inguinal hernias repair performed on 1/14/20  Patient is doing well after the surgery  He has no questions or concerns at this time  The following portions of the patient's history were reviewed and updated as appropriate: allergies, current medications, past family history, past medical history, past social history, past surgical history and problem list     Review of Systems      Objective:      Temp 98 2 °F (36 8 °C) (Tympanic)   Ht 6' 1" (1 854 m)   Wt 85 8 kg (189 lb 3 2 oz)   BMI 24 96 kg/m²          Physical Exam   Skin:   Incisions are healing well          By signing my name below, I, Olvin Cary, attest that this documentation has been prepared under the direction and in the presence of Bria Martinez MD  Electronically Signed: Rosanna Grace  1/27/20  Beth Sands personally performed the services described in this documentation  All medical record entries made by the finesseibadia were at my direction and in my presence   I have reviewed the chart and discharge instructions and agree that the record reflects my personal performance and is accurate and complete   Deepti Negrete MD  1/27/20

## 2020-08-22 ENCOUNTER — HOSPITAL ENCOUNTER (EMERGENCY)
Facility: HOSPITAL | Age: 70
Discharge: HOME | End: 2020-08-22
Attending: EMERGENCY MEDICINE | Admitting: EMERGENCY MEDICINE
Payer: COMMERCIAL

## 2020-08-22 VITALS
DIASTOLIC BLOOD PRESSURE: 87 MMHG | OXYGEN SATURATION: 93 % | SYSTOLIC BLOOD PRESSURE: 162 MMHG | HEART RATE: 96 BPM | RESPIRATION RATE: 18 BRPM | TEMPERATURE: 98 F | BODY MASS INDEX: 25.73 KG/M2 | WEIGHT: 195 LBS

## 2020-08-22 DIAGNOSIS — Z23 NEED FOR IMMUNIZATION AGAINST RABIES: ICD-10-CM
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
## 2020-08-22 DIAGNOSIS — S51.852A DOG BITE OF LEFT FOREARM: Primary | ICD-10-CM
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
## 2020-08-22 DIAGNOSIS — S61.452A DOG BITE OF LEFT HAND: ICD-10-CM

## 2020-08-22 PROCEDURE — 99284 EMERGENCY DEPT VISIT MOD MDM: CPT | Performed by: PHYSICIAN ASSISTANT

## 2020-08-22 PROCEDURE — 99283 EMERGENCY DEPT VISIT LOW MDM: CPT

## 2020-08-22 PROCEDURE — 90715 TDAP VACCINE 7 YRS/> IM: CPT | Performed by: PHYSICIAN ASSISTANT

## 2020-08-22 PROCEDURE — 90675 RABIES VACCINE IM: CPT | Performed by: PHYSICIAN ASSISTANT

## 2020-08-22 PROCEDURE — 90471 IMMUNIZATION ADMIN: CPT

## 2020-08-22 PROCEDURE — 90375 RABIES IG IM/SC: CPT | Performed by: PHYSICIAN ASSISTANT

## 2020-08-22 PROCEDURE — 96372 THER/PROPH/DIAG INJ SC/IM: CPT

## 2020-08-22 PROCEDURE — 90472 IMMUNIZATION ADMIN EACH ADD: CPT

## 2020-08-22 RX ORDER — AMOXICILLIN AND CLAVULANATE POTASSIUM 875; 125 MG/1; MG/1
1 TABLET, FILM COATED ORAL EVERY 12 HOURS
Qty: 10 TABLET | Refills: 0 | Status: SHIPPED | OUTPATIENT
Start: 2020-08-22 | End: 2020-08-27

## 2020-08-22 RX ORDER — AMOXICILLIN AND CLAVULANATE POTASSIUM 875; 125 MG/1; MG/1
1 TABLET, FILM COATED ORAL ONCE
Status: COMPLETED | OUTPATIENT
Start: 2020-08-22 | End: 2020-08-22

## 2020-08-22 RX ADMIN — AMOXICILLIN AND CLAVULANATE POTASSIUM 1 TABLET: 875; 125 TABLET, FILM COATED ORAL at 20:43

## 2020-08-22 RX ADMIN — RABIES IMMUNE GLOBULIN (HUMAN) 1770 UNITS: 300 INJECTION, SOLUTION INFILTRATION; INTRAMUSCULAR at 21:06

## 2020-08-22 RX ADMIN — TETANUS TOXOID, REDUCED DIPHTHERIA TOXOID AND ACELLULAR PERTUSSIS VACCINE, ADSORBED 0.5 ML: 5; 2.5; 8; 8; 2.5 SUSPENSION INTRAMUSCULAR at 20:43

## 2020-08-22 RX ADMIN — Medication 1 ML: at 21:05

## 2020-08-23 NOTE — ED PROVIDER NOTES
History  Chief Complaint   Patient presents with    Dog Bite     bitten on left arm by dog today  Neighbor's dog  Police currently investigating vacination status of dog  Patient is a 64 y/o M that presents to the ED with dog bite to left forearm and dorsal left hand that occurred about 1 hour ago  He states the neighbors dog was attacking his dog and he tried to break up the fight and the neighbor's dog bit him  He is unsure of his last tetanus  He states the police are currently investigating the rabies status of the dog  Patient states this dog bit him a couple days ago too  History provided by:  Patient  Dog Bite   Contact animal:  Dog  Location:  Shoulder/arm  Shoulder/arm injury location:  L forearm and L hand  Time since incident:  1 hour  Pain details:     Quality:  Aching    Severity:  Moderate    Timing:  Constant    Progression:  Unchanged  Incident location:  Outside  Tetanus status:  Unknown  Relieved by:  Nothing  Worsened by:  Nothing  Ineffective treatments:  None tried  Associated symptoms: no fever, no numbness, no rash and no swelling        Prior to Admission Medications   Prescriptions Last Dose Informant Patient Reported? Taking?    Rivaroxaban (XARELTO PO)   Yes Yes   Sig: Take by mouth   hydrochlorothiazide (HYDRODIURIL) 12 5 mg tablet 8/22/2020 at Unknown time  Yes Yes   Sig: Take 12 5 mg by mouth daily   metoprolol succinate (TOPROL-XL) 50 mg 24 hr tablet 8/22/2020 at Unknown time  Yes Yes   Sig: Take 50 mg by mouth daily   pravastatin (PRAVACHOL) 40 mg tablet 8/22/2020 at Unknown time  Yes Yes   Sig: Take 40 mg by mouth daily   zolpidem (AMBIEN) 5 mg tablet Not Taking at Unknown time  No No   Sig: Take 1 tablet (5 mg total) by mouth daily at bedtime as needed for sleep   Patient not taking: Reported on 8/22/2020      Facility-Administered Medications: None       Past Medical History:   Diagnosis Date    Atrial fibrillation (HCC)     Cancer (HCC)     malignant melanoma of skin    Cataract of right eye     removal cataract R eye    History of colonic polyps     Hypertension     Insomnia     Mitral valve disorder     history of mitral valve repair    PONV (postoperative nausea and vomiting)     Retinal detachment        Past Surgical History:   Procedure Laterality Date    APPENDECTOMY      CATARACT EXTRACTION      COLONOSCOPY      EYE SURGERY  2009    cataract, detatched retina    LIPOMA RESECTION      right ankle    LYMPH NODE BIOPSY      2/7/11 Dr Mari Garcia, Dr Estefany Bonilla, sentinel lymph node biopsy x2 left posterior neck, lymphoscintigraphy, 12/28/10 Klaudia, Dr Carl Chaudhary sclap excision-melanoma, resolved: 2/7/11      MITRAL VALVE REPAIR  2006    MECHANICAL HEART VALVE    KS EXC SKIN MALIG >4 CM FACE,FACIAL Left 11/2/2017    Procedure: CHEEK BASAL CELL CARCINOMA EXCISION; RECONSTRUCTION;  FROZEN SECTION;  Surgeon: Ashlyn Guzman MD;  [facility];  Service: Plastics    KS EXC TUMOR SOFT TISSUE LEG/ANKLE SUBFASCIAL <3CM Right 12/15/2016    Procedure: EXCISION/RESECTION LOWER EXTREMITY MASS;  Surgeon: Ashlyn Guzman MD;  [facility];  Service: Plastics    KS Francoakin Hoeduardo 19 Bilateral 1/14/2020    Procedure: REPAIR HERNIA Ritta Shadow;  Surgeon: Andi Lundborg, MD;  Location: [facility];  Service: General    RETINAL DETACHMENT SURGERY      resolved: 8/2006    SCALP EXCISION      Excision Of Lesion Scalp Malignant Over 4cm, resolved: 2/7/11    SKIN BIOPSY      SKIN GRAFT      autograft,scalp    TONSILLECTOMY         Family History   Problem Relation Age of Onset    Basal cell carcinoma Mother     Colon cancer Mother     Hypertension Mother     Other Mother         Benign Polyps of the Large Intestine    Colon cancer Family     Substance Abuse Neg Hx     Mental illness Neg Hx      I have reviewed and agree with the history as documented      E-Cigarette/Vaping    E-Cigarette Use Never User      E-Cigarette/Vaping Substances    Nicotine No     THC No     CBD No     Flavoring No     Other No     Unknown No      Social History     Tobacco Use    Smoking status: Never Smoker    Smokeless tobacco: Never Used   Substance Use Topics    Alcohol use: Yes     Frequency: Monthly or less     Comment: 1 x year    Drug use: No       Review of Systems   Constitutional: Negative for chills and fever  Skin: Positive for wound  Negative for rash  Neurological: Negative for dizziness, weakness and numbness  Psychiatric/Behavioral: Negative for confusion  All other systems reviewed and are negative  Physical Exam  Physical Exam  Vitals signs and nursing note reviewed  Constitutional:       General: He is not in acute distress  Appearance: Normal appearance  He is well-developed and well-groomed  Eyes:      Conjunctiva/sclera: Conjunctivae normal       Pupils: Pupils are equal, round, and reactive to light  Neck:      Musculoskeletal: Normal range of motion  Cardiovascular:      Rate and Rhythm: Normal rate  Pulmonary:      Effort: Pulmonary effort is normal    Musculoskeletal: Normal range of motion  General: No swelling or deformity  Skin:     General: Skin is warm and dry  Neurological:      Mental Status: He is alert and oriented to person, place, and time  Sensory: Sensation is intact  Psychiatric:         Behavior: Behavior is cooperative           Vital Signs  ED Triage Vitals [08/22/20 2000]   Temperature Pulse Respirations Blood Pressure SpO2   98 °F (36 7 °C) 96 18 162/87 93 %      Temp Source Heart Rate Source Patient Position - Orthostatic VS BP Location FiO2 (%)   Temporal Monitor Lying Right arm --      Pain Score       No Pain           Vitals:    08/22/20 2000   BP: 162/87   Pulse: 96   Patient Position - Orthostatic VS: Lying         Visual Acuity      ED Medications  Medications   rabies vaccine, human diploid (IMOVAX RABIES) IM injection 1 mL (has no administration in time range)   rabies immune globulin, human (HyperRAB) injection 1,770 Units (has no administration in time range)   amoxicillin-clavulanate (AUGMENTIN) 875-125 mg per tablet 1 tablet (1 tablet Oral Given 8/22/20 2043)   tetanus-diphtheria-acellular pertussis (BOOSTRIX) IM injection 0 5 mL (0 5 mL Intramuscular Given 8/22/20 2043)       Diagnostic Studies  Results Reviewed     None                 No orders to display              Procedures  Procedures  2010: Wounds cleaned with saline, telfa and bandage applied  2056:  Left forearm cleaned with alcohol, a total of 4mL of rabies immuneglobulin infiltrated around wounds  ED Course  ED Course as of Aug 22 2054   Sat Aug 22, 2020   2046 Patient received update on rabies status of dog  The dog was bought on facebook and has not been immunized  US AUDIT      Most Recent Value   Initial Alcohol Screen: US AUDIT-C    1  How often do you have a drink containing alcohol?  0 Filed at: 08/22/2020 1958   2  How many drinks containing alcohol do you have on a typical day you are drinking? 0 Filed at: 08/22/2020 1958   3a  Male UNDER 65: How often do you have five or more drinks on one occasion? 0 Filed at: 08/22/2020 1958   3b  FEMALE Any Age, or MALE 65+: How often do you have 4 or more drinks on one occassion? 0 Filed at: 08/22/2020 1958   Audit-C Score  0 Filed at: 08/22/2020 1958                  JA/DAST-10      Most Recent Value   How many times in the past year have you    Used an illegal drug or used a prescription medication for non-medical reasons?   Never Filed at: 08/22/2020 1958                                MDM  Number of Diagnoses or Management Options  Dog bite of left forearm: new and requires workup  Dog bite of left hand: new and requires workup  Need for immunization against rabies: new and requires workup  Diagnosis management comments: Patient with dog bite, wound cleaned with saline, will start on abx and update tetanus  Dog is not UTD with immunizations, owners are unreliable, will start rabies vaccine  Patient Progress  Patient progress: stable        Disposition  Final diagnoses:   Dog bite of left forearm   Dog bite of left hand   Need for immunization against rabies     Time reflects when diagnosis was documented in both MDM as applicable and the Disposition within this note     Time User Action Codes Description Comment    8/22/2020  8:25 PM Birdie Boeck Add [C30 445B,  W54  0XXA] Dog bite of left forearm     8/22/2020  8:26 PM Birdie Boeck Add [O63 990D,  W54  0XXA] Dog bite of left hand     8/22/2020  8:47 PM Birdie Boeck Add [Z23] Need for immunization against rabies       ED Disposition     ED Disposition Condition Date/Time Comment    Discharge Stable Sat Aug 22, 2020  8:52 PM Ivis Lesser discharge to home/self care  Follow-up Information     Follow up With Specialties Details Why Contact Info Additional Information    Meir Brito MD Family Medicine Call in 3 days For recheck [address]  [place] [phone]        [address] Emergency Department Emergency Medicine Go in 3 days for rabies vaccine  [address] [address]  [phone]  ED, [address] [facility], [facility], [facility] [address]          Patient's Medications   Discharge Prescriptions    AMOXICILLIN-CLAVULANATE (AUGMENTIN) 875-125 MG PER TABLET    Take 1 tablet by mouth every 12 (twelve) hours for 5 days       Start Date: 8/22/2020 End Date: 8/27/2020       Order Dose: 1 tablet       Quantity: 10 tablet    Refills: 0     No discharge procedures on file      PDMP Review       Value Time User    PDMP Reviewed  Yes 1/14/2020 10:43 AM Samira Newman PA-C          ED Provider  Electronically Signed by           Goran Lopez PA-C  08/22/20 2054

## 2020-08-23 NOTE — DISCHARGE INSTRUCTIONS
Rest, elevate arm  Tylenol/motrin for discomfort  Clean twice a day with soap and water  Take augmentin twice a day for next 5 days  Follow up with family doctor in 2-3 days for recheck  Return to ER on:  Tuesday 8/25 Saturday 8/29 Saturday 9/5 for rabies vaccine

## 2020-08-25 ENCOUNTER — TELEPHONE (OUTPATIENT)
Dept: ADMINISTRATIVE | Facility: OTHER | Age: 70
End: 2020-08-25

## 2020-08-25 ENCOUNTER — HOSPITAL ENCOUNTER (EMERGENCY)
Facility: HOSPITAL | Age: 70
Discharge: HOME | End: 2020-08-25
Attending: EMERGENCY MEDICINE | Admitting: EMERGENCY MEDICINE
Payer: COMMERCIAL

## 2020-08-25 VITALS
HEART RATE: 87 BPM | BODY MASS INDEX: 25.84 KG/M2 | TEMPERATURE: 97.2 F | DIASTOLIC BLOOD PRESSURE: 89 MMHG | RESPIRATION RATE: 20 BRPM | WEIGHT: 195 LBS | OXYGEN SATURATION: 97 % | SYSTOLIC BLOOD PRESSURE: 141 MMHG | HEIGHT: 73 IN

## 2020-08-25 DIAGNOSIS — Z23 ENCOUNTER FOR REPEAT ADMINISTRATION OF RABIES VACCINATION: Primary | ICD-10-CM

## 2020-08-25 PROCEDURE — 90471 IMMUNIZATION ADMIN: CPT

## 2020-08-25 PROCEDURE — 99281 EMR DPT VST MAYX REQ PHY/QHP: CPT | Performed by: EMERGENCY MEDICINE

## 2020-08-25 PROCEDURE — 90675 RABIES VACCINE IM: CPT | Performed by: EMERGENCY MEDICINE

## 2020-08-25 RX ADMIN — RABIES VIRUS STRAIN PM-1503-3M ANTIGEN (PROPIOLACTONE INACTIVATED) AND WATER 1 ML: KIT at 10:29

## 2020-08-25 NOTE — ED PROVIDER NOTES
History  Chief Complaint   Patient presents with    Follow Up Rabies     patient presents to the ED for second rabies vaccine      Patient here for 2nd rabies vaccine after left arm dog bite 3 days ago  Wound still oozes occasionally but no purulence or erythema  Prior to Admission Medications   Prescriptions Last Dose Informant Patient Reported? Taking?    Rivaroxaban (XARELTO PO)   Yes No   Sig: Take 20 mg by mouth    amoxicillin-clavulanate (AUGMENTIN) 875-125 mg per tablet   No No   Sig: Take 1 tablet by mouth every 12 (twelve) hours for 5 days   hydrochlorothiazide (HYDRODIURIL) 12 5 mg tablet   Yes No   Sig: Take 12 5 mg by mouth daily   metoprolol succinate (TOPROL-XL) 50 mg 24 hr tablet   Yes No   Sig: Take 50 mg by mouth daily   pravastatin (PRAVACHOL) 40 mg tablet   Yes No   Sig: Take 40 mg by mouth daily   zolpidem (AMBIEN) 5 mg tablet   No No   Sig: Take 1 tablet (5 mg total) by mouth daily at bedtime as needed for sleep   Patient not taking: Reported on 8/22/2020      Facility-Administered Medications: None       Past Medical History:   Diagnosis Date    Atrial fibrillation (HCC)     Cancer (HCC)     malignant melanoma of skin    Cataract of right eye     removal cataract R eye    History of colonic polyps     Hypertension     Insomnia     Mitral valve disorder     history of mitral valve repair    PONV (postoperative nausea and vomiting)     Retinal detachment        Past Surgical History:   Procedure Laterality Date    APPENDECTOMY      CATARACT EXTRACTION      COLONOSCOPY      EYE SURGERY  2009    cataract, detatched retina    LIPOMA RESECTION      right ankle    LYMPH NODE BIOPSY      2/7/11 Dr Bunny Ziegler, Dr Dayne Velazquez, sentinel lymph node biopsy x2 left posterior neck, lymphoscintigraphy, 12/28/10 Klaud[place], Dr Tevin Lieberman sclap excision-melanoma, resolved: 2/7/11      MITRAL VALVE REPAIR  2006    MECHANICAL HEART VALVE    AL EXC SKIN MALIG >4 CM FACE,FACIAL Left 11/2/2017    Procedure: CHEEK BASAL CELL CARCINOMA EXCISION; RECONSTRUCTION;  FROZEN SECTION;  Surgeon: Priya Hernandez MD;  [facility];  Service: Plastics    WA EXC TUMOR SOFT TISSUE LEG/ANKLE SUBFASCIAL <3CM Right 12/15/2016    Procedure: EXCISION/RESECTION LOWER EXTREMITY MASS;  Surgeon: Priya Hernandez MD;  [facility];  Service: [address] Bilateral 1/14/2020    Procedure: Jasbire Marie;  Surgeon: Araceli Estrada MD;  [facility];  Service: General    RETINAL DETACHMENT SURGERY      resolved: 8/2006    SCALP EXCISION      Excision Of Lesion Scalp Malignant Over 4cm, resolved: 2/7/11    SKIN BIOPSY      SKIN GRAFT      autograft,scalp    TONSILLECTOMY         Family History   Problem Relation Age of Onset    Basal cell carcinoma Mother     Colon cancer Mother     Hypertension Mother     Other Mother         Benign Polyps of the Large Intestine    Colon cancer Family     Substance Abuse Neg Hx     Mental illness Neg Hx      I have reviewed and agree with the history as documented  E-Cigarette/Vaping    E-Cigarette Use Never User      E-Cigarette/Vaping Substances    Nicotine No     THC No     CBD No     Flavoring No     Other No     Unknown No      Social History     Tobacco Use    Smoking status: Never Smoker    Smokeless tobacco: Never Used   Substance Use Topics    Alcohol use: Yes     Frequency: Monthly or less     Comment: 1 x year    Drug use: No       Review of Systems   All other systems reviewed and are negative  Physical Exam  Physical Exam  Vitals signs and nursing note reviewed  Constitutional:       Appearance: He is well-developed  Eyes:      Conjunctiva/sclera: Conjunctivae normal       Pupils: Pupils are equal, round, and reactive to light  Neck:      Musculoskeletal: Normal range of motion and neck supple  No spinous process tenderness     Cardiovascular: Rate and Rhythm: Normal rate and regular rhythm  Heart sounds: Normal heart sounds  Pulmonary:      Effort: Pulmonary effort is normal  No respiratory distress  Breath sounds: Normal breath sounds  No wheezing  Abdominal:      General: Bowel sounds are normal  There is no distension  Palpations: Abdomen is soft  Tenderness: There is no abdominal tenderness  Musculoskeletal: Normal range of motion  Arms:       Right hand: He exhibits normal range of motion and no tenderness  Hands:    Skin:     General: Skin is warm and dry  Findings: No rash  Neurological:      Mental Status: He is alert  GCS: GCS eye subscore is 4  GCS verbal subscore is 5  GCS motor subscore is 6  Sensory: No sensory deficit  Vital Signs  ED Triage Vitals [08/25/20 0955]   Temperature Pulse Respirations Blood Pressure SpO2   (!) 97 2 °F (36 2 °C) 87 20 141/89 97 %      Temp Source Heart Rate Source Patient Position - Orthostatic VS BP Location FiO2 (%)   Tympanic Monitor Sitting Left arm --      Pain Score       --           Vitals:    08/25/20 0955   BP: 141/89   Pulse: 87   Patient Position - Orthostatic VS: Sitting         Visual Acuity      ED Medications  Medications   rabies vaccine, human diploid (IMOVAX RABIES) IM injection 1 mL (1 mL Intramuscular Given 8/25/20 1029)       Diagnostic Studies  Results Reviewed     None                 No orders to display              Procedures  Procedures         ED Course       US AUDIT      Most Recent Value   Initial Alcohol Screen: US AUDIT-C    1  How often do you have a drink containing alcohol?  0 Filed at: 08/25/2020 0955   2  How many drinks containing alcohol do you have on a typical day you are drinking? 0 Filed at: 08/25/2020 0955   3a  Male UNDER 65: How often do you have five or more drinks on one occasion? 0 Filed at: 08/25/2020 0955   3b  FEMALE Any Age, or MALE 65+:  How often do you have 4 or more drinks on one occassion? 0 Filed at: 08/25/2020 0955   Audit-C Score  0 Filed at: 08/25/2020 3361                  JA/DAST-10      Most Recent Value   How many times in the past year have you    Used an illegal drug or used a prescription medication for non-medical reasons? Never Filed at: 08/25/2020 8663                                [facility]  Number of Diagnoses or Management Options  Encounter for repeat administration of rabies vaccination: established and improving     Amount and/or Complexity of Data Reviewed  Obtain history from someone other than the patient: yes    Patient Progress  Patient progress: improved        Disposition  Final diagnoses:   Encounter for repeat administration of rabies vaccination     Time reflects when diagnosis was documented in both MDM as applicable and the Disposition within this note     Time User Action Codes Description Comment    8/25/2020  9:59 AM Con Maher Add [Z23] Encounter for repeat administration of rabies vaccination       ED Disposition     ED Disposition Condition Date/Time Comment    Discharge Stable Tue Aug 25, 2020  9:59 AM Az Nair discharge to home/self care              Follow-up Information    None         Discharge Medication List as of 8/25/2020 10:01 AM      CONTINUE these medications which have NOT CHANGED    Details   amoxicillin-clavulanate (AUGMENTIN) 875-125 mg per tablet Take 1 tablet by mouth every 12 (twelve) hours for 5 days, Starting Sat 8/22/2020, Until Thu 8/27/2020, Normal      hydrochlorothiazide (HYDRODIURIL) 12 5 mg tablet Take 12 5 mg by mouth daily, Historical Med      metoprolol succinate (TOPROL-XL) 50 mg 24 hr tablet Take 50 mg by mouth daily, Starting Fri 8/9/2019, Historical Med      pravastatin (PRAVACHOL) 40 mg tablet Take 40 mg by mouth daily, Until Discontinued, Historical Med      Rivaroxaban (XARELTO PO) Take 20 mg by mouth , Historical Med      zolpidem (AMBIEN) 5 mg tablet Take 1 tablet (5 mg total) by mouth daily at bedtime as needed for sleep, Starting Tue 8/20/2019, Normal           No discharge procedures on file      PDMP Review       Value Time User    PDMP Reviewed  Yes 1/14/2020 10:43 AM Kassi Newman PA-C          ED Provider  Electronically Signed by           Jennifer Ramirez DO  08/25/20 1721

## 2020-08-25 NOTE — TELEPHONE ENCOUNTER
[facility] [place]    ED Visit Information     Ed visit date: 8/22/20  Diagnosis Description: Dog Bite  In Network? [place]  Discharge status: Home  Discharged with meds ? Yes  Number of ED visits to date: 2  ED Severity:NA     Outreach Information    Outreach successful: N/A  Date letter mailed:na  Date Finalized:8/25/20    Care Coordination      Transportation issues ?  No    Value Consolidated [place]

## 2020-08-29 ENCOUNTER — HOSPITAL ENCOUNTER (EMERGENCY)
Facility: HOSPITAL | Age: 70
Discharge: HOME | End: 2020-08-29
Attending: EMERGENCY MEDICINE | Admitting: EMERGENCY MEDICINE
Payer: COMMERCIAL

## 2020-08-29 VITALS
WEIGHT: 195 LBS | BODY MASS INDEX: 25.84 KG/M2 | SYSTOLIC BLOOD PRESSURE: 131 MMHG | RESPIRATION RATE: 20 BRPM | DIASTOLIC BLOOD PRESSURE: 76 MMHG | OXYGEN SATURATION: 99 % | HEIGHT: 73 IN | HEART RATE: 80 BPM | TEMPERATURE: 97.7 F

## 2020-08-29 DIAGNOSIS — Z23 NEED FOR RABIES VACCINATION: Primary | ICD-10-CM

## 2020-08-29 PROCEDURE — 90675 RABIES VACCINE IM: CPT | Performed by: PHYSICIAN ASSISTANT

## 2020-08-29 PROCEDURE — 99282 EMERGENCY DEPT VISIT SF MDM: CPT | Performed by: PHYSICIAN ASSISTANT

## 2020-08-29 PROCEDURE — 90471 IMMUNIZATION ADMIN: CPT

## 2020-08-29 RX ADMIN — RABIES VIRUS STRAIN PM-1503-3M ANTIGEN (PROPIOLACTONE INACTIVATED) AND WATER 1 ML: KIT at 09:31

## 2020-08-29 NOTE — ED PROVIDER NOTES
History  Chief Complaint   Patient presents with    Follow Up Rabies     patient presents to the ED needing third rabies shot      Patient is a 64 y/o M that presents to the ED for his 3rd rabies vaccine  Patient was bitten by a dog, immunizations were not UTD  Wound on left hand and forearm are healing well  No other complaints  Prior to Admission Medications   Prescriptions Last Dose Informant Patient Reported? Taking?    Rivaroxaban (XARELTO PO)   Yes No   Sig: Take 20 mg by mouth    hydrochlorothiazide (HYDRODIURIL) 12 5 mg tablet   Yes No   Sig: Take 12 5 mg by mouth daily   metoprolol succinate (TOPROL-XL) 50 mg 24 hr tablet   Yes No   Sig: Take 50 mg by mouth daily   pravastatin (PRAVACHOL) 40 mg tablet   Yes No   Sig: Take 40 mg by mouth daily   zolpidem (AMBIEN) 5 mg tablet   No No   Sig: Take 1 tablet (5 mg total) by mouth daily at bedtime as needed for sleep   Patient not taking: Reported on 8/22/2020      Facility-Administered Medications: None       Past Medical History:   Diagnosis Date    Atrial fibrillation (HCC)     Cancer (HCC)     malignant melanoma of skin    Cataract of right eye     removal cataract R eye    History of colonic polyps     Hypertension     Insomnia     Mitral valve disorder     history of mitral valve repair    PONV (postoperative nausea and vomiting)     Retinal detachment        Past Surgical History:   Procedure Laterality Date    APPENDECTOMY      CATARACT EXTRACTION      COLONOSCOPY      EYE SURGERY  2009    cataract, detatched retina    LIPOMA RESECTION      right ankle    LYMPH NODE BIOPSY      2/7/11 Dr Bunny Ziegler, Dr Dayne Velazquez, sentinel lymph node biopsy x2 left posterior neck, lymphoscintigraphy, 12/28/10 Klaud[place], Dr Tevin Lieberman sclap excision-melanoma, resolved: 2/7/11      MITRAL VALVE REPAIR  2006    MECHANICAL HEART VALVE    LA EXC SKIN MALIG >4 CM FACE,FACIAL Left 11/2/2017    Procedure: CHEEK BASAL CELL CARCINOMA EXCISION; RECONSTRUCTION;  FROZEN SECTION;  Surgeon: Josiah Yates MD;  [facility];  Service: Plastics    CO EXC TUMOR SOFT TISSUE LEG/ANKLE SUBFASCIAL <3CM Right 12/15/2016    Procedure: EXCISION/RESECTION LOWER EXTREMITY MASS;  Surgeon: Josiah Yates MD;  [facility];  Service: [address] Bilateral 1/14/2020    Procedure: Broome Gravel;  Surgeon: Andrea Milligan MD;  [facility];  Service: General    RETINAL DETACHMENT SURGERY      resolved: 8/2006    SCALP EXCISION      Excision Of Lesion Scalp Malignant Over 4cm, resolved: 2/7/11    SKIN BIOPSY      SKIN GRAFT      autograft,scalp    TONSILLECTOMY         Family History   Problem Relation Age of Onset    Basal cell carcinoma Mother     Colon cancer Mother     Hypertension Mother     Other Mother         Benign Polyps of the Large Intestine    Colon cancer Family     Substance Abuse Neg Hx     Mental illness Neg Hx      I have reviewed and agree with the history as documented  E-Cigarette/Vaping    E-Cigarette Use Never User      E-Cigarette/Vaping Substances    Nicotine No     THC No     CBD No     Flavoring No     Other No     Unknown No      Social History     Tobacco Use    Smoking status: Never Smoker    Smokeless tobacco: Never Used   Substance Use Topics    Alcohol use: Yes     Frequency: Monthly or less     Comment: 1 x year    Drug use: No       Review of Systems   Constitutional: Negative for chills and fever  Skin: Positive for wound  Neurological: Negative for dizziness, weakness and numbness  All other systems reviewed and are negative  Physical Exam  Physical Exam  Vitals signs and nursing note reviewed  Constitutional:       Appearance: Normal appearance  HENT:      Head: Normocephalic and atraumatic        Nose: Nose normal    Eyes:      Conjunctiva/sclera: Conjunctivae normal    Cardiovascular:      Rate and Rhythm: Normal rate and regular rhythm  Heart sounds: No murmur  Pulmonary:      Effort: Pulmonary effort is normal       Breath sounds: Normal breath sounds  Musculoskeletal: Normal range of motion  Skin:     General: Skin is warm and dry  Findings: No rash  Comments: Healing wound left forearm and left dorsal hand  No swelling erythema, warmth or purulent discharge  Neurological:      Mental Status: He is alert and oriented to person, place, and time  Vital Signs  ED Triage Vitals [08/29/20 0924]   Temperature Pulse Respirations Blood Pressure SpO2   97 7 °F (36 5 °C) 80 20 131/76 99 %      Temp Source Heart Rate Source Patient Position - Orthostatic VS BP Location FiO2 (%)   Temporal Monitor Sitting Left arm --      Pain Score       --           Vitals:    08/29/20 0924   BP: 131/76   Pulse: 80   Patient Position - Orthostatic VS: Sitting         Visual Acuity      ED Medications  Medications   rabies vaccine, human diploid (IMOVAX RABIES) IM injection 1 mL (1 mL Intramuscular Given 8/29/20 0931)       Diagnostic Studies  Results Reviewed     None                 No orders to display              Procedures  Procedures         ED Course       US AUDIT      Most Recent Value   Initial Alcohol Screen: US AUDIT-C    1  How often do you have a drink containing alcohol?  0 Filed at: 08/29/2020 0925   2  How many drinks containing alcohol do you have on a typical day you are drinking? 0 Filed at: 08/29/2020 0925   3a  Male UNDER 65: How often do you have five or more drinks on one occasion? 0 Filed at: 08/29/2020 0925   3b  FEMALE Any Age, or MALE 65+: How often do you have 4 or more drinks on one occassion? 0 Filed at: 08/29/2020 0925   Audit-C Score  0 Filed at: 08/29/2020 7230                  JA/DAST-10      Most Recent Value   How many times in the past year have you    Used an illegal drug or used a prescription medication for non-medical reasons?   Never Filed at: 08/29/2020 0925                                [facility]  Number of Diagnoses or Management Options  Need for rabies vaccination: minor  Patient Progress  Patient progress: stable        Disposition  Final diagnoses:   Need for rabies vaccination     Time reflects when diagnosis was documented in both MDM as applicable and the Disposition within this note     Time User Action Codes Description Comment    8/29/2020  9:26 AM Madeline Mcduffie Add [Z23] Need for rabies vaccination       ED Disposition     ED Disposition Condition Date/Time Comment    Discharge Stable Sat Aug 29, 2020  9:26 AM Shaunfeng Holland discharge to home/self care  Follow-up Information     Follow up With Specialties Details Why Contact Info Additional Seth Villanueva 17[address] Emergency Department Emergency Medicine In 1 week for rabies vaccine [address] [address]  [phone]  ED, [address] [facility] [place] [facility]          Discharge Medication List as of 8/29/2020  9:26 AM      CONTINUE these medications which have NOT CHANGED    Details   hydrochlorothiazide (HYDRODIURIL) 12 5 mg tablet Take 12 5 mg by mouth daily, Historical Med      metoprolol succinate (TOPROL-XL) 50 mg 24 hr tablet Take 50 mg by mouth daily, Starting Fri 8/9/2019, Historical Med      pravastatin (PRAVACHOL) 40 mg tablet Take 40 mg by mouth daily, Until Discontinued, Historical Med      Rivaroxaban (XARELTO PO) Take 20 mg by mouth , Historical Med      zolpidem (AMBIEN) 5 mg tablet Take 1 tablet (5 mg total) by mouth daily at bedtime as needed for sleep, Starting Tue 8/20/2019, Normal           No discharge procedures on file      PDMP Review       Value Time User    PDMP Reviewed  Yes 1/14/2020 10:43 AM Simon Newman PA-C          ED Provider  Electronically Signed by           Andres Street PA-C  08/29/20 5908

## 2020-09-05 ENCOUNTER — HOSPITAL ENCOUNTER (EMERGENCY)
Facility: HOSPITAL | Age: 70
Discharge: HOME | End: 2020-09-05
Attending: EMERGENCY MEDICINE | Admitting: EMERGENCY MEDICINE
Payer: COMMERCIAL

## 2020-09-05 VITALS
HEIGHT: 73 IN | OXYGEN SATURATION: 99 % | TEMPERATURE: 97 F | DIASTOLIC BLOOD PRESSURE: 82 MMHG | BODY MASS INDEX: 23.33 KG/M2 | WEIGHT: 176 LBS | RESPIRATION RATE: 18 BRPM | HEART RATE: 79 BPM | SYSTOLIC BLOOD PRESSURE: 138 MMHG

## 2020-09-05 DIAGNOSIS — Z23 NEED FOR RABIES VACCINATION: Primary | ICD-10-CM

## 2020-09-05 PROCEDURE — 99284 EMERGENCY DEPT VISIT MOD MDM: CPT | Performed by: PHYSICIAN ASSISTANT

## 2020-09-05 PROCEDURE — 90675 RABIES VACCINE IM: CPT | Performed by: PHYSICIAN ASSISTANT

## 2020-09-05 PROCEDURE — 90471 IMMUNIZATION ADMIN: CPT

## 2020-09-05 RX ADMIN — RABIES VIRUS STRAIN PM-1503-3M ANTIGEN (PROPIOLACTONE INACTIVATED) AND WATER 1 ML: KIT at 09:21

## 2020-09-05 NOTE — ED PROVIDER NOTES
History  Chief Complaint   Patient presents with    Follow Up Rabies     Patient states that he is here for his 4th rabies shot     Patient is a 64 y/o M that presents to the ED for his 4th and final rabies vaccine  Patient has no complaints  Patient has a healing wound to left arm  No signs of infection  History provided by:  Patient      Prior to Admission Medications   Prescriptions Last Dose Informant Patient Reported? Taking?    Rivaroxaban (XARELTO PO)   Yes No   Sig: Take 20 mg by mouth    hydrochlorothiazide (HYDRODIURIL) 12 5 mg tablet   Yes No   Sig: Take 12 5 mg by mouth daily   metoprolol succinate (TOPROL-XL) 50 mg 24 hr tablet   Yes No   Sig: Take 50 mg by mouth daily   pravastatin (PRAVACHOL) 40 mg tablet   Yes No   Sig: Take 40 mg by mouth daily   zolpidem (AMBIEN) 5 mg tablet   No No   Sig: Take 1 tablet (5 mg total) by mouth daily at bedtime as needed for sleep   Patient not taking: Reported on 8/22/2020      Facility-Administered Medications: None       Past Medical History:   Diagnosis Date    Atrial fibrillation (HCC)     Cancer (HCC)     malignant melanoma of skin    Cataract of right eye     removal cataract R eye    History of colonic polyps     Hypertension     Insomnia     Mitral valve disorder     history of mitral valve repair    PONV (postoperative nausea and vomiting)     Retinal detachment        Past Surgical History:   Procedure Laterality Date    APPENDECTOMY      CATARACT EXTRACTION      COLONOSCOPY      EYE SURGERY  2009    cataract, detatched retina    LIPOMA RESECTION      right ankle    LYMPH NODE BIOPSY      2/7/11 Dr Monisha Archuleta, Dr Ever Dodson, sentinel lymph node biopsy x2 left posterior neck, lymphoscintigraphy, 12/28/10 Dr Hans Rudolph sclap excision-melanoma, resolved: 2/7/11      MITRAL VALVE REPAIR  2006    MECHANICAL HEART VALVE    VA EXC SKIN MALIG >4 CM FACE,FACIAL Left 11/2/2017    Procedure: CHEEK BASAL CELL CARCINOMA EXCISION; RECONSTRUCTION;  FROZEN SECTION;  Surgeon: Rubia Grace MD;  [facility];  Service: Plastics    OR EXC TUMOR SOFT TISSUE LEG/ANKLE SUBFASCIAL <3CM Right 12/15/2016    Procedure: EXCISION/RESECTION LOWER EXTREMITY MASS;  Surgeon: Rubia Grace MD;  [facility];  Service: [address] Bilateral 1/14/2020    Procedure: Prashant Lunsford;  Surgeon: Shana Sanders MD;  [facility];  Service: General    RETINAL DETACHMENT SURGERY      resolved: 8/2006    SCALP EXCISION      Excision Of Lesion Scalp Malignant Over 4cm, resolved: 2/7/11    SKIN BIOPSY      SKIN GRAFT      autograft,scalp    TONSILLECTOMY         Family History   Problem Relation Age of Onset    Basal cell carcinoma Mother     Colon cancer Mother     Hypertension Mother     Other Mother         Benign Polyps of the Large Intestine    Colon cancer Family     Substance Abuse Neg Hx     Mental illness Neg Hx      I have reviewed and agree with the history as documented  E-Cigarette/Vaping    E-Cigarette Use Never User      E-Cigarette/Vaping Substances    Nicotine No     THC No     CBD No     Flavoring No     Other No     Unknown No      Social History     Tobacco Use    Smoking status: Never Smoker    Smokeless tobacco: Never Used   Substance Use Topics    Alcohol use: Yes     Frequency: Monthly or less     Comment: 1 x year    Drug use: No       Review of Systems   Constitutional: Negative for chills and fever  Respiratory: Negative for cough and shortness of breath  Cardiovascular: Negative for chest pain and leg swelling  Skin: Positive for wound (left arm )  Physical Exam  Physical Exam  Vitals signs and nursing note reviewed  Constitutional:       Appearance: Normal appearance  He is normal weight  HENT:      Head: Normocephalic     Eyes:      Conjunctiva/sclera: Conjunctivae normal    Neck: Musculoskeletal: Normal range of motion  Cardiovascular:      Rate and Rhythm: Normal rate and regular rhythm  Heart sounds: No murmur  Pulmonary:      Effort: Pulmonary effort is normal       Breath sounds: Normal breath sounds  Skin:     General: Skin is warm and dry  Comments: Left dorsal forearm with eschar over wound  No surrounding erythema, purulent discharge or warmth  Wound healing well without signs of infection  Neurological:      Mental Status: He is alert and oriented to person, place, and time  Sensory: Sensation is intact  Psychiatric:         Mood and Affect: Mood normal          Vital Signs  ED Triage Vitals   Temperature Pulse Respirations Blood Pressure SpO2   09/05/20 0918 09/05/20 0917 09/05/20 0917 09/05/20 0917 09/05/20 0917   (!) 97 °F (36 1 °C) 79 18 138/82 99 %      Temp src Heart Rate Source Patient Position - Orthostatic VS BP Location FiO2 (%)   -- -- -- -- --             Pain Score       --                  Vitals:    09/05/20 0917   BP: 138/82   Pulse: 79         Visual Acuity      ED Medications  Medications   rabies vaccine, human diploid (IMOVAX RABIES) IM injection 1 mL (1 mL Intramuscular Given 9/5/20 9242)       Diagnostic Studies  Results Reviewed     None                 No orders to display              Procedures  Procedures         ED Course       US AUDIT      Most Recent Value   Initial Alcohol Screen: US AUDIT-C    1  How often do you have a drink containing alcohol?  0 Filed at: 09/05/2020 0916   2  How many drinks containing alcohol do you have on a typical day you are drinking? 0 Filed at: 09/05/2020 0916   3a  Male UNDER 65: How often do you have five or more drinks on one occasion? 0 Filed at: 09/05/2020 0916   3b  FEMALE Any Age, or MALE 65+: How often do you have 4 or more drinks on one occassion?   0 Filed at: 09/05/2020 0916   Audit-C Score  0 Filed at: 09/05/2020 0916                  JA/DAST-10      Most Recent Value   How many times in the past year have you    Used an illegal drug or used a prescription medication for non-medical reasons? Never Filed at: 09/05/2020 6737                                [facility]  Number of Diagnoses or Management Options  Need for rabies vaccination: minor  Diagnosis management comments: Patient here for last rabies vaccine  I spoke with Dr Bere South from [address] [place] [address] because patient received one of his vaccines in his thigh and they should be given in the deltoid  He states patient does not need to return to a 5th vaccine due to this, since he received all the other boosters in his deltoid  Patient Progress  Patient progress: stable        Disposition  Final diagnoses:   Need for rabies vaccination     Time reflects when diagnosis was documented in both MDM as applicable and the Disposition within this note     Time User Action Codes Description Comment    9/5/2020  9:21 AM London Lewis Add [Z23] Need for rabies vaccination       ED Disposition     ED Disposition Condition Date/Time Comment    Discharge Stable Sat Sep 5, 2020  9:21 AM Chana Kimbrough discharge to home/self care              Follow-up Information     Follow up With Specialties Details Why Contact Info Additional Seth Villanueva 4[address] Emergency Department Emergency Medicine Call  As needed 100 [place], [phone] [phone] [address] ED, [address] [place] [address] 10          Discharge Medication List as of 9/5/2020  9:22 AM      CONTINUE these medications which have NOT CHANGED    Details   hydrochlorothiazide (HYDRODIURIL) 12 5 mg tablet Take 12 5 mg by mouth daily, Historical Med      metoprolol succinate (TOPROL-XL) 50 mg 24 hr tablet Take 50 mg by mouth daily, Starting Fri 8/9/2019, Historical Med      pravastatin (PRAVACHOL) 40 mg tablet Take 40 mg by mouth daily, Until Discontinued, Historical Med      Rivaroxaban (XARELTO PO) Take 20 mg by mouth , Historical Med      zolpidem (AMBIEN) 5 mg tablet Take 1 tablet (5 mg total) by mouth daily at bedtime as needed for sleep, Starting Tue 8/20/2019, Normal           No discharge procedures on file      PDMP Review       Value Time User    PDMP Reviewed  Yes 1/14/2020 10:43 AM Dharmesh Newman PA-C          ED Provider  Electronically Signed by           Elyse Kendrick PA-C  09/05/20 9976

## 2020-12-03 ENCOUNTER — OFFICE VISIT (OUTPATIENT)
Dept: FAMILY MEDICINE CLINIC | Facility: CLINIC | Age: 70
End: 2020-12-03
Payer: COMMERCIAL

## 2020-12-03 VITALS
OXYGEN SATURATION: 98 % | HEIGHT: 72 IN | TEMPERATURE: 97.1 F | DIASTOLIC BLOOD PRESSURE: 70 MMHG | BODY MASS INDEX: 24.92 KG/M2 | HEART RATE: 100 BPM | SYSTOLIC BLOOD PRESSURE: 104 MMHG | WEIGHT: 184 LBS

## 2020-12-03 DIAGNOSIS — G47.00 INSOMNIA, UNSPECIFIED TYPE: ICD-10-CM

## 2020-12-03 DIAGNOSIS — K40.91 UNILATERAL RECURRENT INGUINAL HERNIA WITHOUT OBSTRUCTION OR GANGRENE: Primary | ICD-10-CM

## 2020-12-03 DIAGNOSIS — I48.91 ATRIAL FIBRILLATION, UNSPECIFIED TYPE (HCC): ICD-10-CM

## 2020-12-03 DIAGNOSIS — C43.9 MALIGNANT MELANOMA OF SKIN, UNSPECIFIED (HCC): ICD-10-CM

## 2020-12-03 PROCEDURE — 3725F SCREEN DEPRESSION PERFORMED: CPT | Performed by: FAMILY MEDICINE

## 2020-12-03 PROCEDURE — 1036F TOBACCO NON-USER: CPT | Performed by: FAMILY MEDICINE

## 2020-12-03 PROCEDURE — 99213 OFFICE O/P EST LOW 20 MIN: CPT | Performed by: FAMILY MEDICINE

## 2020-12-03 PROCEDURE — 3008F BODY MASS INDEX DOCD: CPT | Performed by: FAMILY MEDICINE

## 2020-12-03 RX ORDER — HYDROCHLOROTHIAZIDE 25 MG/1
12.5 TABLET ORAL DAILY
COMMUNITY
Start: 2020-10-02

## 2020-12-03 RX ORDER — RIVAROXABAN 20 MG/1
1 TABLET, FILM COATED ORAL DAILY
COMMUNITY
Start: 2020-09-18

## 2020-12-03 RX ORDER — ZOLPIDEM TARTRATE 5 MG/1
5 TABLET ORAL
Qty: 30 TABLET | Refills: 5 | Status: SHIPPED | OUTPATIENT
Start: 2020-12-03 | End: 2022-02-08 | Stop reason: SDUPTHER

## 2020-12-03 RX ORDER — ZOLPIDEM TARTRATE 5 MG/1
5 TABLET ORAL
Qty: 30 TABLET | Refills: 5 | Status: SHIPPED | OUTPATIENT
Start: 2020-12-03 | End: 2020-12-03 | Stop reason: SDUPTHER

## 2021-01-11 ENCOUNTER — TELEPHONE (OUTPATIENT)
Dept: FAMILY MEDICINE CLINIC | Facility: CLINIC | Age: 71
End: 2021-01-11

## 2021-01-11 DIAGNOSIS — E78.01 FAMILIAL HYPERCHOLESTEROLEMIA: Primary | ICD-10-CM

## 2021-01-11 DIAGNOSIS — Z12.5 SCREENING PSA (PROSTATE SPECIFIC ANTIGEN): ICD-10-CM

## 2021-01-11 DIAGNOSIS — E78.2 MIXED HYPERLIPIDEMIA: ICD-10-CM

## 2021-01-11 DIAGNOSIS — I48.91 ATRIAL FIBRILLATION, UNSPECIFIED TYPE (HCC): ICD-10-CM

## 2021-01-11 NOTE — TELEPHONE ENCOUNTER
Pt has an appt on January 25th for a medicare wellness visit  Wants to get bloodwork done at [facility]

## 2021-01-21 LAB
ALBUMIN SERPL-MCNC: 4 G/DL (ref 3.7–4.7)
ALBUMIN/GLOB SERPL: 1.3 {RATIO} (ref 1.2–2.2)
ALP SERPL-CCNC: 90 IU/L (ref 39–117)
ALT SERPL-CCNC: 14 IU/L (ref 0–44)
AST SERPL-CCNC: 18 IU/L (ref 0–40)
BILIRUB SERPL-MCNC: 0.7 MG/DL (ref 0–1.2)
BUN SERPL-MCNC: 22 MG/DL (ref 8–27)
BUN/CREAT SERPL: 22 (ref 10–24)
CALCIUM SERPL-MCNC: 9.1 MG/DL (ref 8.6–10.2)
CHLORIDE SERPL-SCNC: 102 MMOL/L (ref 96–106)
CHOLEST SERPL-MCNC: 122 MG/DL (ref 100–199)
CO2 SERPL-SCNC: 27 MMOL/L (ref 20–29)
CREAT SERPL-MCNC: 1 MG/DL (ref 0.76–1.27)
GLOBULIN SER-MCNC: 3 G/DL (ref 1.5–4.5)
GLUCOSE SERPL-MCNC: 91 MG/DL (ref 65–99)
HDLC SERPL-MCNC: 38 MG/DL
LDLC SERPL CALC-MCNC: 64 MG/DL (ref 0–99)
LDLC/HDLC SERPL: 1.7 RATIO (ref 0–3.6)
POTASSIUM SERPL-SCNC: 4.2 MMOL/L (ref 3.5–5.2)
PROT SERPL-MCNC: 7 G/DL (ref 6–8.5)
PSA SERPL-MCNC: 2.1 NG/ML (ref 0–4)
SL AMB EGFR AFRICAN AMERICAN: 87 ML/MIN/1.73
SL AMB EGFR NON AFRICAN AMERICAN: 75 ML/MIN/1.73
SL AMB REFLEX CRITERIA: NORMAL
SL AMB VLDL CHOLESTEROL CALC: 20 MG/DL (ref 5–40)
SODIUM SERPL-SCNC: 141 MMOL/L (ref 134–144)
TRIGL SERPL-MCNC: 108 MG/DL (ref 0–149)

## 2021-01-25 ENCOUNTER — OFFICE VISIT (OUTPATIENT)
Dept: FAMILY MEDICINE CLINIC | Facility: CLINIC | Age: 71
End: 2021-01-25
Payer: COMMERCIAL

## 2021-01-25 VITALS
DIASTOLIC BLOOD PRESSURE: 80 MMHG | WEIGHT: 190.8 LBS | BODY MASS INDEX: 25.84 KG/M2 | OXYGEN SATURATION: 96 % | TEMPERATURE: 96.6 F | HEIGHT: 72 IN | HEART RATE: 86 BPM | SYSTOLIC BLOOD PRESSURE: 132 MMHG

## 2021-01-25 DIAGNOSIS — Z00.00 MEDICARE ANNUAL WELLNESS VISIT, SUBSEQUENT: Primary | ICD-10-CM

## 2021-01-25 DIAGNOSIS — I48.91 ATRIAL FIBRILLATION, UNSPECIFIED TYPE (HCC): ICD-10-CM

## 2021-01-25 DIAGNOSIS — C43.9 MALIGNANT MELANOMA OF SKIN (HCC): ICD-10-CM

## 2021-01-25 DIAGNOSIS — I10 ESSENTIAL HYPERTENSION: ICD-10-CM

## 2021-01-25 PROCEDURE — 1170F FXNL STATUS ASSESSED: CPT | Performed by: FAMILY MEDICINE

## 2021-01-25 PROCEDURE — 1160F RVW MEDS BY RX/DR IN RCRD: CPT | Performed by: FAMILY MEDICINE

## 2021-01-25 PROCEDURE — 99213 OFFICE O/P EST LOW 20 MIN: CPT | Performed by: FAMILY MEDICINE

## 2021-01-25 PROCEDURE — G0439 PPPS, SUBSEQ VISIT: HCPCS | Performed by: FAMILY MEDICINE

## 2021-01-25 PROCEDURE — 3075F SYST BP GE 130 - 139MM HG: CPT | Performed by: FAMILY MEDICINE

## 2021-01-25 PROCEDURE — 3725F SCREEN DEPRESSION PERFORMED: CPT | Performed by: FAMILY MEDICINE

## 2021-01-25 PROCEDURE — 3079F DIAST BP 80-89 MM HG: CPT | Performed by: FAMILY MEDICINE

## 2021-01-25 PROCEDURE — 1125F AMNT PAIN NOTED PAIN PRSNT: CPT | Performed by: FAMILY MEDICINE

## 2021-01-25 PROCEDURE — 3008F BODY MASS INDEX DOCD: CPT | Performed by: FAMILY MEDICINE

## 2021-01-25 NOTE — PATIENT INSTRUCTIONS

## 2021-01-25 NOTE — PROGRESS NOTES
HPI:  Kanu Collazo is a 65 y o  male here for his yearly health maintenance exam    Patient Active Problem List   Diagnosis    [facility]    Malignant melanoma of skin ([facility] )    Familial hypercholesterolemia    Insomnia    Mitral valve disorder    Hypertension     Past Medical History:   Diagnosis Date    [facility]     Cancer ([facility] )     malignant melanoma of skin    Cataract of right eye     removal cataract R eye    History of colonic polyps     Hypertension     Insomnia     Mitral valve disorder     history of mitral valve repair    PONV (postoperative nausea and vomiting)     Retinal detachment        1  Advanced Directive: ***     2  Durable Power of  for Healthcare: ***     3  Social History:           Drug and alcohol History: ***                  4  Immunizations up to date: ***                 Lifestyle:                           Healthy Diet:***                          Alcohol Use:***                          Tobacco Use:***                          Regular exercise:***                          Weight concerns:***                               5  Over the past 2 weeks, how often have you been bothered by the following:              Little interest or pleasure in doing things:***              Felling down, depressed or hopeless:***       Current Outpatient Medications   Medication Sig Dispense Refill    hydrochlorothiazide (HYDRODIURIL) 25 mg tablet Take 12 5 mg by mouth daily      metoprolol succinate (TOPROL-XL) 50 mg 24 hr tablet Take 50 mg by mouth daily  3    pravastatin (PRAVACHOL) 40 mg tablet Take 40 mg by mouth daily      Xarelto 20 MG tablet 1 tablet daily      zolpidem (AMBIEN) 5 mg tablet Take 1 tablet (5 mg total) by mouth daily at bedtime as needed for sleep 30 tablet 5     No current facility-administered medications for this visit        No Known Allergies  Immunization History   Administered Date(s) Administered    INFLUENZA 10/03/2017, 09/04/2018, 10/06/2020    Influenza Split High Dose Preservative Free IM 09/30/2015, 09/16/2016    Influenza, seasonal, injectable 09/16/2014    Pneumococcal Polysaccharide PPV23 09/18/2013    Rabies-IM Human Diploid Cell Culture 08/22/2020, 08/25/2020, 08/29/2020, 09/05/2020    Tdap 08/22/2020    Zoster 10/22/2014    Zoster Vaccine Recombinant 09/30/2019, 02/07/2020    influenza, trivalent, adjuvanted 09/30/2019       Patient Care Team:  Loretta Sargent MD as PCP - General  MD Efrem Anglin MD    Review of Systems   Constitutional: Negative for fatigue, fever and unexpected weight change  HENT: Negative for congestion, sinus pain and sore throat  Eyes: Negative for visual disturbance  Respiratory: Negative for shortness of breath and wheezing  Cardiovascular: Negative for chest pain and palpitations  Gastrointestinal: Negative for abdominal pain, nausea and vomiting  Musculoskeletal: Negative  Negative for arthralgias and myalgias  Neurological: Negative for syncope, weakness and numbness  Psychiatric/Behavioral: Negative  Negative for confusion, dysphoric mood and suicidal ideas  Physical Exam :  Physical Exam  Constitutional:       Appearance: He is well-developed  HENT:      Right Ear: Ear canal normal  Tympanic membrane is not injected  Left Ear: Ear canal normal  Tympanic membrane is not injected  Nose: Nose normal    Eyes:      General:         Right eye: No discharge  Left eye: No discharge  Conjunctiva/sclera: Conjunctivae normal       Pupils: Pupils are equal, round, and reactive to light  Neck:      Musculoskeletal: Normal range of motion and neck supple  Thyroid: No thyromegaly  Cardiovascular:      Rate and Rhythm: Normal rate and regular rhythm  Heart sounds: Normal heart sounds  No murmur  Pulmonary:      Effort: Pulmonary effort is normal  No respiratory distress        Breath sounds: Normal breath sounds  No wheezing  Abdominal:      General: Bowel sounds are normal  There is no distension  Palpations: Abdomen is soft  Tenderness: There is no abdominal tenderness  Musculoskeletal: Normal range of motion  Lymphadenopathy:      Cervical: No cervical adenopathy  Skin:     General: Skin is warm and dry  Neurological:      Mental Status: He is alert and oriented to person, place, and time  He is not disoriented  Sensory: No sensory deficit  Gait: Gait normal       Deep Tendon Reflexes: Reflexes are normal and symmetric  Psychiatric:         Speech: Speech normal          Behavior: Behavior normal          Thought Content: Thought content normal          Judgment: Judgment normal            Assessment and Plan:  1  Medicare annual wellness visit, subsequent     2  Atrial fibrillation, unspecified type ([facility] )     3  Essential hypertension     4   Malignant melanoma of skin [facility])         Health Maintenance Due   Topic Date Due    Hepatitis C Screening  1950    Colonoscopy Surveillance  08/19/2020    Medicare Annual Wellness Visit (AWV)  08/20/2020    BMI: Followup Plan  12/12/2020

## 2021-01-25 NOTE — PROGRESS NOTES
Assessment and Plan:     Problem List Items Addressed This Visit        Cardiovascular and Mediastinum    Atrial fibrillation ([facility] )    Hypertension       Musculoskeletal and Integument    Malignant melanoma of skin ([facility] )      Other Visit Diagnoses     Medicare annual wellness visit, subsequent    -  Primary        BMI Counseling: Body mass index is 25 88 kg/m²  The BMI is above normal  Nutrition recommendations include decreasing portion sizes  Exercise recommendations include moderate physical activity 150 minutes/week  No pharmacotherapy was ordered  Preventive health issues were discussed with patient, and age appropriate screening tests were ordered as noted in patient's After Visit Summary  Personalized health advice and appropriate referrals for health education or preventive services given if needed, as noted in patient's After Visit Summary       History of Present Illness:     Patient presents for Medicare Annual Wellness visit    Patient Care Team:  Adair Foy MD as PCP - MD Emeterio Almaraz MD     Problem List:     Patient Active Problem List   Diagnosis    Atrial fibrillation ([facility] )    Malignant melanoma of skin ([facility] )    Familial hypercholesterolemia    Insomnia    Mitral valve disorder    Hypertension      Past Medical and Surgical History:     Past Medical History:   Diagnosis Date    Atrial fibrillation ([facility] )     [facility]     malignant melanoma of skin    Cataract of right eye     removal cataract R eye    History of colonic polyps     Hypertension     Insomnia     Mitral valve disorder     history of mitral valve repair    PONV (postoperative nausea and vomiting)     Retinal detachment      Past Surgical History:   Procedure Laterality Date    APPENDECTOMY      CATARACT EXTRACTION      COLONOSCOPY      EYE SURGERY  2009    cataract, detatched retina    LIPOMA RESECTION      right ankle    LYMPH NODE BIOPSY      2/7/11 Dr Jackson Wyatt,  Felisa sclap melanoma, sentinel lymph node biopsy x2 left posterior neck, lymphoscintigraphy, 12/28/10 Dr Jamel Rudolph sclap excision-melanoma, resolved: 2/7/11      MITRAL VALVE REPAIR  2006    MECHANICAL HEART VALVE    ND EXC SKIN MALIG >4 CM FACE,FACIAL Left 11/2/2017    Procedure: CHEEK BASAL CELL CARCINOMA EXCISION; RECONSTRUCTION;  FROZEN SECTION;  Surgeon: Anisha Michael MD;  [facility];  Service: Plastics    ND EXC TUMOR SOFT TISSUE LEG/ANKLE SUBFASCIAL <3CM Right 12/15/2016    Procedure: EXCISION/RESECTION LOWER EXTREMITY MASS;  Surgeon: Anisha Michael MD;  [facility];  Service: [address] [place] [address] Bilateral 1/14/2020    Procedure: Ivett Chase;  Surgeon: iTm Quispe MD;  Location:  MAIN OR;  Service: General    RETINAL DETACHMENT SURGERY      resolved: 8/2006    SCALP EXCISION      Excision Of Lesion Scalp Malignant Over 4cm, resolved: 2/7/11    SKIN BIOPSY      SKIN GRAFT      autograft,scalp    TONSILLECTOMY        Family History:     Family History   Problem Relation Age of Onset    Basal cell carcinoma Mother     Colon cancer Mother     Hypertension Mother     Other Mother         Benign Polyps of the Large Intestine    Colon cancer Family     Substance Abuse Neg Hx     Mental illness Neg Hx       Social History:     E-Cigarette/Vaping    E-Cigarette Use Never User      E-Cigarette/Vaping Substances    Nicotine No     THC No     CBD No     Flavoring No     Other No     Unknown No      Social History     Socioeconomic History    Marital status: /Civil Union     Spouse name: Bimal Arias Number of children: 2    Years of education: Not on file    Highest education level: Not on file   Occupational History    Occupation: Retired   Social Needs    Financial resource strain: Not on file    Food insecurity     Worry: Not on file     Inability: Not on file   Extra Life needs     Medical: Not on file     Non-medical: Not on file   Tobacco Use    Smoking status: Never Smoker    Smokeless tobacco: Never Used   Substance and Sexual Activity    Alcohol use: Not Currently     Frequency: Monthly or less    Drug use: No    Sexual activity: Not on file   Lifestyle    Physical activity     Days per week: Not on file     Minutes per session: Not on file    Stress: Not on file   Relationships    Social connections     Talks on phone: Not on file     Gets together: Not on file     Attends Orthodox service: Not on file     Active member of club or organization: Not on file     Attends meetings of clubs or organizations: Not on file     Relationship status: Not on file    Intimate partner violence     Fear of current or ex partner: Not on file     Emotionally abused: Not on file     Physically abused: Not on file     Forced sexual activity: Not on file   Other Topics Concern    Not on file   Social History Narrative    Not on file      Medications and Allergies:     Current Outpatient Medications   Medication Sig Dispense Refill    hydrochlorothiazide (HYDRODIURIL) 25 mg tablet Take 12 5 mg by mouth daily      metoprolol succinate (TOPROL-XL) 50 mg 24 hr tablet Take 50 mg by mouth daily  3    pravastatin (PRAVACHOL) 40 mg tablet Take 40 mg by mouth daily      Xarelto 20 MG tablet 1 tablet daily      zolpidem (AMBIEN) 5 mg tablet Take 1 tablet (5 mg total) by mouth daily at bedtime as needed for sleep 30 tablet 5     No current facility-administered medications for this visit        No Known Allergies   Immunizations:     Immunization History   Administered Date(s) Administered    INFLUENZA 10/03/2017, 09/04/2018, 10/06/2020    Influenza Split High Dose Preservative Free IM 09/30/2015, 09/16/2016    Influenza, seasonal, injectable 09/16/2014    Pneumococcal Polysaccharide PPV23 09/18/2013    Rabies-IM Human Diploid Cell Culture 08/22/2020, 08/25/2020, 08/29/2020, 09/05/2020  Tdap 08/22/2020    Zoster 10/22/2014    Zoster Vaccine Recombinant 09/30/2019, 02/07/2020    influenza, trivalent, adjuvanted 09/30/2019      Health Maintenance:         Topic Date Due    Hepatitis C Screening  1950    Colonoscopy Surveillance  08/19/2020    Colorectal Cancer Screening  08/19/2025     There are no preventive care reminders to display for this patient  Medicare Health Risk Assessment:     /80   Pulse 86   Temp (!) 96 6 °F (35 9 °C) (Temporal)   Ht 6' (1 829 m) Comment: with shoes  Wt 86 5 kg (190 lb 12 8 oz)   SpO2 96%   BMI 25 88 kg/m²      Oscar Webb is here for his Subsequent Wellness visit  Health Risk Assessment:   Patient rates overall health as very good  Patient feels that their physical health rating is same  Eyesight was rated as slightly worse  Hearing was rated as same  Patient feels that their emotional and mental health rating is same  Pain experienced in the last 7 days has been none  Patient states that he has experienced no weight loss or gain in last 6 months  Depression Screening:   PHQ-2 Score: 0      Fall Risk Screening: In the past year, patient has experienced: no history of falling in past year      Home Safety:  Patient does not have trouble with stairs inside or outside of their home  Patient has working smoke alarms and has no working carbon monoxide detector  Home safety hazards include: none  Nutrition:   Current diet is Regular  Medications:   Patient is currently taking over-the-counter supplements  OTC medications include: see medication list  Patient is able to manage medications  Activities of Daily Living (ADLs)/Instrumental Activities of Daily Living (IADLs):   Walk and transfer into and out of bed and chair?: Yes  Dress and groom yourself?: Yes    Bathe or shower yourself?: Yes    Feed yourself?  Yes  Do your laundry/housekeeping?: Yes  Manage your money, pay your bills and track your expenses?: Yes  Make your own meals?: Yes    Do your own shopping?: Yes    Previous Hospitalizations:   Any hospitalizations or ED visits within the last 12 months?: Yes    How many hospitalizations have you had in the last year?: 1-2    Advance Care Planning:   Living will: Yes    Durable POA for healthcare:  Yes    Advanced directive: Yes      Cognitive Screening:   Provider or family/friend/caregiver concerned regarding cognition?: No    PREVENTIVE SCREENINGS      Cardiovascular Screening:    General: Screening Not Indicated and History Lipid Disorder      Diabetes Screening:     General: Screening Current      Colorectal Cancer Screening:     General: Screening Current      Prostate Cancer Screening:    General: Screening Current      Osteoporosis Screening:    General: Screening Not Indicated      Abdominal Aortic Aneurysm (AAA) Screening:    Risk factors include: age between 73-67 yo        General: Screening Current      Lung Cancer Screening:     General: Screening Not Indicated      Michael Dougherty MD

## 2021-03-03 DIAGNOSIS — Z23 ENCOUNTER FOR IMMUNIZATION: ICD-10-CM

## 2021-03-07 ENCOUNTER — IMMUNIZATIONS (OUTPATIENT)
Dept: FAMILY MEDICINE CLINIC | Facility: HOSPITAL | Age: 71
End: 2021-03-07

## 2021-03-07 DIAGNOSIS — Z23 ENCOUNTER FOR IMMUNIZATION: Primary | ICD-10-CM

## 2021-03-07 PROCEDURE — 0001A SARS-COV-2 / COVID-19 MRNA VACCINE (PFIZER-BIONTECH) 30 MCG: CPT

## 2021-03-07 PROCEDURE — 91300 SARS-COV-2 / COVID-19 MRNA VACCINE (PFIZER-BIONTECH) 30 MCG: CPT

## 2021-03-28 ENCOUNTER — IMMUNIZATIONS (OUTPATIENT)
Dept: FAMILY MEDICINE CLINIC | Facility: HOSPITAL | Age: 71
End: 2021-03-28

## 2021-03-28 DIAGNOSIS — Z23 ENCOUNTER FOR IMMUNIZATION: Primary | ICD-10-CM

## 2021-03-28 PROCEDURE — 91300 SARS-COV-2 / COVID-19 MRNA VACCINE (PFIZER-BIONTECH) 30 MCG: CPT

## 2021-03-28 PROCEDURE — 0002A SARS-COV-2 / COVID-19 MRNA VACCINE (PFIZER-BIONTECH) 30 MCG: CPT

## 2021-05-05 ENCOUNTER — APPOINTMENT (EMERGENCY)
Dept: RADIOLOGY | Facility: HOSPITAL | Age: 71
End: 2021-05-05
Payer: COMMERCIAL

## 2021-05-05 ENCOUNTER — HOSPITAL ENCOUNTER (EMERGENCY)
Facility: HOSPITAL | Age: 71
Discharge: HOME | End: 2021-05-05
Attending: EMERGENCY MEDICINE | Admitting: EMERGENCY MEDICINE
Payer: COMMERCIAL

## 2021-05-05 VITALS
HEART RATE: 74 BPM | SYSTOLIC BLOOD PRESSURE: 135 MMHG | HEIGHT: 73 IN | RESPIRATION RATE: 18 BRPM | DIASTOLIC BLOOD PRESSURE: 90 MMHG | WEIGHT: 190 LBS | TEMPERATURE: 97 F | OXYGEN SATURATION: 97 % | BODY MASS INDEX: 25.18 KG/M2

## 2021-05-05 DIAGNOSIS — S93.401A SPRAIN OF RIGHT ANKLE: ICD-10-CM

## 2021-05-05 DIAGNOSIS — S82.842A BIMALLEOLAR FRACTURE OF LEFT ANKLE: ICD-10-CM
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
## 2021-05-05 DIAGNOSIS — S82.832A CLOSED FRACTURE OF PROXIMAL END OF LEFT FIBULA: ICD-10-CM

## 2021-05-05 PROCEDURE — 99283 EMERGENCY DEPT VISIT LOW MDM: CPT

## 2021-05-05 PROCEDURE — 73610 X-RAY EXAM OF ANKLE: CPT

## 2021-05-05 PROCEDURE — 99284 EMERGENCY DEPT VISIT MOD MDM: CPT | Performed by: PHYSICIAN ASSISTANT

## 2021-05-05 PROCEDURE — 73590 X-RAY EXAM OF LOWER LEG: CPT

## 2021-05-05 PROCEDURE — 29515 APPLICATION SHORT LEG SPLINT: CPT | Performed by: PHYSICIAN ASSISTANT

## 2021-05-05 RX ORDER — HYDROCODONE BITARTRATE AND ACETAMINOPHEN 5; 325 MG/1; MG/1
1 TABLET ORAL EVERY 6 HOURS PRN
Qty: 15 TABLET | Refills: 0 | Status: SHIPPED | OUTPATIENT
Start: 2021-05-05 | End: 2022-02-08

## 2021-05-05 NOTE — DISCHARGE INSTRUCTIONS
Rest, ice, elevate leg  Tylenol/motrin for discomfort, vicodin for severe pain  Keep splint dry and intact until seen by ortho  Call ortho tomorrow for a follow up appointment

## 2021-05-05 NOTE — ED PROVIDER NOTES
History  Chief Complaint   Patient presents with    Fall     Patient states that he missed stepped and fell down four steps  Denies LOC  Denies head strike  Positive blood thinning medications     Patient is a 66 y/o M with h/o a-fib on xarelto, HTN that presents to the ED with left leg pain and right ankle pain after a fall that occurred 2 5 hours ago at home  He states he was carrying a wash basket down the steps and missed a step and fell down 4 steps onto a concrete floor  He states he twisted his left leg and right ankle and landed on his hands  He denies hitting head or LOC  No other injuries  He was able to ambulated with assistance of a cane  He did not take anything for pain  History provided by:  Patient  Fall  Mechanism of injury: fall    Injury location:  Leg  Leg injury location:  L lower leg and R ankle  Incident location:  Home  Time since incident:  2 hours  Arrived directly from scene: no    Fall:     Fall occurred:  Down stairs    Impact surface:  [place]    Point of impact:  Hands and knees  Protective equipment: none    Suspicion of alcohol use: no    Suspicion of drug use: no    Tetanus status:  Up to date ()  Prior to arrival data:     Bystander interventions:  None    Patient ambulatory at scene: yes      Blood loss:  None    Responsiveness at scene:  Alert    Orientation at scene:  Person, place and situation    Loss of consciousness: no      Amnesic to event: no    Associated symptoms: no abdominal pain, no back pain, no chest pain, no difficulty breathing, no headaches, no hearing loss, no loss of consciousness, no nausea, no neck pain and no vomiting    Risk factors: anticoagulation therapy        Prior to Admission Medications   Prescriptions Last Dose Informant Patient Reported? Taking?    Xarelto 20 MG tablet   Yes No   Si tablet daily   hydrochlorothiazide (HYDRODIURIL) 25 mg tablet   Yes No   Sig: Take 12 5 mg by mouth daily   metoprolol succinate (TOPROL-XL) 50 mg 24 hr tablet   Yes No   Sig: Take 50 mg by mouth daily   pravastatin (PRAVACHOL) 40 mg tablet   Yes No   Sig: Take 40 mg by mouth daily   zolpidem (AMBIEN) 5 mg tablet   No No   Sig: Take 1 tablet (5 mg total) by mouth daily at bedtime as needed for sleep      Facility-Administered Medications: None       Past Medical History:   Diagnosis Date    At[facility] fibrillation [facility] )     Cancer ([facility] )     malignant melanoma of skin    Cataract of right eye     removal cataract R eye    History of colonic polyps     Hypertension     Insomnia     Mitral valve disorder     history of mitral valve repair    PONV (postoperative nausea and vomiting)     Retinal detachment        Past Surgical History:   Procedure Laterality Date    APPENDECTOMY      CATARACT EXTRACTION      COLONOSCOPY      EYE SURGERY  2009    cataract, detatched retina    LIPOMA RESECTION      right ankle    LYMPH NODE BIOPSY      2/7/11 Dr Selma Villaseñor, Dr Trenton Moreland, sentinel lymph node biopsy x2 left posterior neck, lymphoscintigraphy, 12/28/10 Dr Paul Rudolph Ready sclap excision-melanoma, resolved: 2/7/11      MITRAL VALVE REPAIR  2006    MECHANICAL HEART VALVE    UT EXC SKIN MALIG >4 CM FACE,FACIAL Left 11/2/2017    Procedure: CHEEK BASAL CELL CARCINOMA EXCISION; RECONSTRUCTION;  FROZEN SECTION;  Surgeon: Jadon Shaffer MD;  [facility];  Service: Plastics    UT EXC TUMOR SOFT TISSUE LEG/ANKLE SUBFASCIAL <3CM Right 12/15/2016    Procedure: EXCISION/RESECTION LOWER EXTREMITY MASS;  Surgeon: Jadon Shaffer MD;  [facility];  Service: Plastics    UT [address] Bilateral 1/14/2020    Procedure: REPAIR HERNIA INGUINAL, LAPAROSCOPIC;  Surgeon: Mimi Mackey MD;  Location:  MAIN OR;  Service: General    RETINAL DETACHMENT SURGERY      resolved: 8/2006    SCALP EXCISION      Excision Of Lesion Scalp Malignant Over 4cm, resolved: 2/7/11    SKIN BIOPSY      SKIN GRAFT autograft,scalp    TONSILLECTOMY         Family History   Problem Relation Age of Onset    Basal cell carcinoma Mother     Colon cancer Mother     Hypertension Mother     Other Mother         Benign Polyps of the Large Intestine    Colon cancer Family     Substance Abuse Neg Hx     Mental illness Neg Hx      I have reviewed and agree with the history as documented  E-Cigarette/Vaping    E-Cigarette Use Never User      E-Cigarette/Vaping Substances    Nicotine No     THC No     CBD No     Flavoring No     Other No     Unknown No      Social History     Tobacco Use    Smoking status: Never Smoker    Smokeless tobacco: Never Used   Substance Use Topics    Alcohol use: Not Currently     Frequency: Monthly or less    Drug use: No       Review of Systems   Constitutional: Negative for chills and fever  HENT: Negative for congestion and hearing loss  Eyes: Negative for visual disturbance  Respiratory: Negative for cough and shortness of breath  Cardiovascular: Negative for chest pain, palpitations and leg swelling  Gastrointestinal: Negative for abdominal pain, diarrhea, nausea and vomiting  Genitourinary: Negative for dysuria  Musculoskeletal: Negative for back pain and neck pain  Left leg pain, right ankle pain   Skin: Positive for wound (right knee abrasion)  Neurological: Negative for dizziness, loss of consciousness, weakness, light-headedness and headaches  All other systems reviewed and are negative  Physical Exam  Physical Exam  Vitals signs and nursing note reviewed  Constitutional:       General: He is not in acute distress  Appearance: Normal appearance  He is well-developed, well-groomed and normal weight  He is not ill-appearing or diaphoretic  HENT:      Head: Normocephalic and atraumatic        Right Ear: Hearing and external ear normal       Left Ear: Hearing and external ear normal       Nose: Nose normal    Eyes:      Conjunctiva/sclera: Conjunctivae normal       Pupils: Pupils are equal    Neck:      Musculoskeletal: Normal range of motion  No spinous process tenderness or muscular tenderness  Cardiovascular:      Rate and Rhythm: Normal rate  Rhythm irregular  Pulses:           Dorsalis pedis pulses are 2+ on the right side and 2+ on the left side  Heart sounds: Normal heart sounds  Pulmonary:      Effort: Pulmonary effort is normal       Breath sounds: Normal breath sounds  No wheezing, rhonchi or rales  Chest:      Chest wall: No swelling or tenderness  Abdominal:      General: Abdomen is flat  Bowel sounds are normal       Palpations: Abdomen is soft  Tenderness: There is no abdominal tenderness  Musculoskeletal:      Left hip: Normal       Right knee: Normal       Left knee: Normal       Right ankle: He exhibits normal range of motion, no swelling, no ecchymosis, no deformity, no laceration and normal pulse  Tenderness  Lateral malleolus tenderness found  No medial malleolus, no AITFL, no head of 5th metatarsal and no proximal fibula tenderness found  Left ankle: Normal       Left lower leg: He exhibits tenderness, bony tenderness (proximal fibula  ) and swelling  He exhibits no deformity and no laceration  Right foot: Normal    Skin:     General: Skin is warm and dry  Coloration: Skin is not jaundiced  Findings: Abrasion (superficial clean abrasion to right knee  ) present  No bruising or rash  Neurological:      General: No focal deficit present  Mental Status: He is alert and oriented to person, place, and time  GCS: GCS eye subscore is 4  GCS verbal subscore is 5  GCS motor subscore is 6  Cranial Nerves: Cranial nerves are intact  Sensory: Sensation is intact  Motor: Motor function is intact  Coordination: Coordination is intact        Gait: Gait abnormal    Psychiatric:         Mood and Affect: Mood normal          Speech: Speech normal          Behavior: Behavior is cooperative  Cognition and Memory: Cognition normal          Vital Signs  ED Triage Vitals [05/05/21 1417]   Temperature Pulse Respirations Blood Pressure SpO2   (!) 97 °F (36 1 °C) 69 18 137/74 98 %      Temp src Heart Rate Source Patient Position - Orthostatic VS BP Location FiO2 (%)   -- -- Lying Right arm --      Pain Score       --           Vitals:    05/05/21 1417 05/05/21 1430 05/05/21 1445 05/05/21 1530   BP: 137/74 138/85 148/81 135/90   Pulse: 69 73 70 74   Patient Position - Orthostatic VS: Lying            Visual Acuity  Visual Acuity      Most Recent Value   L Pupil Size (mm)  3   R Pupil Size (mm)  3          ED Medications  Medications - No data to display    Diagnostic Studies  Results Reviewed     None                 XR ankle 3+ views RIGHT   ED Interpretation by Sabrina Reed PA-C (05/05 1458)   Trimalleolar fracture      XR tibia fibula 2 views LEFT   ED Interpretation by Sabrina Reed PA-C (05/05 1458)   Proximal fibula fracture  Procedures  Splint application    Date/Time: 5/5/2021 3:33 PM  Performed by: Sabrina Reed PA-C  Authorized by: Sabrina Reed PA-C   Universal Protocol:  Consent: Verbal consent obtained  Consent given by: patient      Pre-procedure details:     Sensation:  Normal    Skin color:  Warm, pink  Procedure details:     Laterality:  Left    Location:  Ankle (and prox fib  )    Ankle:  L ankleCast type:  Short leg      Splint type:  Sugar tong and short leg    Supplies:  Cotton padding, elastic bandage and Ortho-Glass  Post-procedure details:     Pain:  Unchanged    Sensation:  Normal    Patient tolerance of procedure: Tolerated well, no immediate complications  Comments:      Patient declined ace bandage to right ankle  Patient given crutches, but also has a walker at home  He was instructed to not bear weight on leg                ED Course  ED Course as of May 05 1607   Wed May 05, 2021   1452 Ortho paged concerning trimal and prox fib fracture  1 Dr Jossie Mcelroy reviewed films and advised posterior and sugar tong splint and f/u with Dr Nellie Hartman  SBIRT 22yo+      Most Recent Value   SBIRT (24 yo +)   In order to provide better care to our patients, we are screening all of our patients for alcohol and drug use  Would it be okay to ask you these screening questions? No Filed at: 05/05/2021 1038                    [facility]  Number of Diagnoses or Management Options  Bimalleolar fracture of left ankle: new and requires workup  Closed fracture of proximal end of left fibula: new and requires workup  Fall: new and requires workup  Sprain of right ankle: new and requires workup  Diagnosis management comments: Patient with left leg pain after a fall, will xray to r/o fracture  Right ankle pain with palpation, no fracture on xray, patient declined ace bandage  Posterior and sugar tong splint placed on left leg and advised f/u with ortho, non weight bearing with crutches  Upon further review of xray with Dr Jossie Mcelroy, he feels this is a toña rather than trimal         Amount and/or Complexity of Data Reviewed  Tests in the radiology section of CPT®: ordered and reviewed  Discuss the patient with other providers: yes  Independent visualization of images, tracings, or specimens: yes    Patient Progress  Patient progress: stable      Disposition  Final diagnoses:   Fall   Sprain of right ankle   Bimalleolar fracture of left ankle   Closed fracture of proximal end of left fibula     Time reflects when diagnosis was documented in both MDM as applicable and the Disposition within this note     Time User Action Codes Description Comment    5/5/2021  3:44 PM Braulio Merrill [E00  [id number]] Fall     5/5/2021  3:44 PM Braulio Merrill Chitra Rand Sprain of right ankle     5/5/2021  3:44 PM Braulio Merrill [Q39 181S] Bimalleolar fracture of left ankle     5/5/2021  3:44 PM Braulio Gastelum Add [A84 [id number]] Closed fracture of proximal end of left fibula       ED Disposition     ED Disposition Condition Date/Time Comment    Discharge Stable Wed May 5, 2021  3:43 PM Maxime Silvestre discharge to home/self care  Follow-up Information     Follow up With Specialties Details Why Contact Jan Leal MD Orthopedic Surgery Follow up in 1 week(s)  12 Ashley Hernandez MD Orthopedic Surgery Call in 1 day For recheck Ruben Haskins [address]  [phone]            Discharge Medication List as of 5/5/2021  3:46 PM      START taking these medications    Details   HYDROcodone-acetaminophen (NORCO) 5-325 mg per tablet Take 1 tablet by mouth every 6 (six) hours as needed for painMax Daily Amount: 4 tablets, Starting Wed 5/5/2021, Normal         CONTINUE these medications which have NOT CHANGED    Details   hydrochlorothiazide (HYDRODIURIL) 25 mg tablet Take 12 5 mg by mouth daily, Starting Fri 10/2/2020, Historical Med      metoprolol succinate (TOPROL-XL) 50 mg 24 hr tablet Take 50 mg by mouth daily, Starting Fri 8/9/2019, Historical Med      pravastatin (PRAVACHOL) 40 mg tablet Take 40 mg by mouth daily, Until Discontinued, Historical Med      Xarelto 20 MG tablet 1 tablet daily, Starting Fri 9/18/2020, Historical Med      zolpidem (AMBIEN) 5 mg tablet Take 1 tablet (5 mg total) by mouth daily at bedtime as needed for sleep, Starting Thu 12/3/2020, Normal           No discharge procedures on file      PDMP Review       Value Time User    PDMP Reviewed  Yes 1/14/2020 10:43 AM Filipe Newman PA-C          ED Provider  Electronically Signed by           Puneet Greer PA-C  05/05/21 5479

## 2021-05-06 ENCOUNTER — APPOINTMENT (OUTPATIENT)
Dept: RADIOLOGY | Facility: CLINIC | Age: 71
End: 2021-05-06
Payer: COMMERCIAL

## 2021-05-06 ENCOUNTER — OFFICE VISIT (OUTPATIENT)
Dept: OBGYN CLINIC | Facility: CLINIC | Age: 71
End: 2021-05-06
Payer: COMMERCIAL

## 2021-05-06 VITALS
TEMPERATURE: 97.3 F | BODY MASS INDEX: 25.73 KG/M2 | HEART RATE: 81 BPM | DIASTOLIC BLOOD PRESSURE: 67 MMHG | HEIGHT: 72 IN | SYSTOLIC BLOOD PRESSURE: 108 MMHG | WEIGHT: 190 LBS

## 2021-05-06 DIAGNOSIS — S82.55XA CLOSED NONDISPLACED FRACTURE OF MEDIAL MALLEOLUS OF LEFT TIBIA, INITIAL ENCOUNTER: Primary | ICD-10-CM

## 2021-05-06 DIAGNOSIS — S82.832A CLOSED TRAUMATIC NONDISPLACED FRACTURE OF PROXIMAL END OF LEFT FIBULA, INITIAL ENCOUNTER: ICD-10-CM

## 2021-05-06 DIAGNOSIS — S82.392A CLOSED TRAUMATIC NONDISPLACED FRACTURE OF POSTERIOR MALLEOLUS OF LEFT TIBIA, INITIAL ENCOUNTER: ICD-10-CM

## 2021-05-06 DIAGNOSIS — S99.912A INJURY OF LEFT ANKLE, INITIAL ENCOUNTER: ICD-10-CM

## 2021-05-06 PROCEDURE — 27808 TREATMENT OF ANKLE FRACTURE: CPT | Performed by: ORTHOPAEDIC SURGERY

## 2021-05-06 PROCEDURE — 1036F TOBACCO NON-USER: CPT | Performed by: ORTHOPAEDIC SURGERY

## 2021-05-06 PROCEDURE — 3008F BODY MASS INDEX DOCD: CPT | Performed by: ORTHOPAEDIC SURGERY

## 2021-05-06 PROCEDURE — 73610 X-RAY EXAM OF ANKLE: CPT

## 2021-05-06 PROCEDURE — 1160F RVW MEDS BY RX/DR IN RCRD: CPT | Performed by: ORTHOPAEDIC SURGERY

## 2021-05-06 PROCEDURE — 99203 OFFICE O/P NEW LOW 30 MIN: CPT | Performed by: ORTHOPAEDIC SURGERY

## 2021-05-06 PROCEDURE — 27780 TREATMENT OF FIBULA FRACTURE: CPT | Performed by: ORTHOPAEDIC SURGERY

## 2021-05-06 NOTE — PROGRESS NOTES
Assessment:     1  Closed nondisplaced fracture of medial malleolus of left tibia, initial encounter    2  Closed traumatic nondisplaced fracture of posterior malleolus of left tibia, initial encounter    3  Closed traumatic nondisplaced fracture of proximal end of left fibula, initial encounter        Plan:     Problem List Items Addressed This Visit        Musculoskeletal and Integument    Closed nondisplaced fracture of medial malleolus of left tibia - Primary    Relevant Orders    XR ankle 3+ vw left    Fracture / Dislocation Treatment    Closed traumatic nondisplaced fracture of posterior malleolus of left tibia    Relevant Orders    Fracture / Dislocation Treatment    Closed traumatic nondisplaced fracture of proximal end of left fibula    Relevant Orders    Fracture / Dislocation Treatment          Findings consistent with left nondisplaced medial malleolus and posterior malleolus fractures of the tibia and nondisplaced proximal fibula shaft fractures  Findings and treatment options were discussed with the patient  X-rays were reviewed with him  Recommend application of short-leg cast , nonweightbearing  Cast instructions were given  Stressed importance of patient remaining nonweightbearing to left lower extremity  Continue ice, elevation and NSAIDs as needed for pain  Follow-up in 1 week with x-rays in cast   Discussed possible surgery if medial malleolus fracture displaces  All patient's questions were answered to his satisfaction  This note is created using dictation transcription  It may contain typographical errors, grammatical errors, improperly dictated words, background noise and other errors  Subjective:     Patient ID: Renny Jones is a 65 y o  male  Chief Complaint: This is a 78-year-old white male here for evaluation of his left ankle  Patient had an injury yesterday on May 5, 2021  He fell going down the last 4 steps in his home into his basement    He twisted his left ankle and felt immediate pain and was unable to weightbear  He went to the emergency room and x-rays revealed medial malleolus, posterior malleolus fractures of the tibia and proximal fibula shaft fractures  He was placed in a splint and referred to Orthopedics  He has minimal pain, and is currently only taking Tylenol as needed  X-rays were done of the right ankle as well in the emergency room  He denies any right ankle pain at this time, and no pain with weight-bearing to the right ankle  He denies any prior injury to his left ankle  Information on patient's intake form was reviewed      Allergy:  No Known Allergies  Medications:  all current active meds have been reviewed  Past Medical History:  Past Medical History:   Diagnosis Date    [facility] [address] [facility]     Cancer ([facility] [address] [facility]     malignant melanoma of skin    Cataract of right eye     removal cataract R eye    History of colonic polyps     Hypertension     Insomnia     Mitral valve disorder     history of mitral valve repair    PONV (postoperative nausea and vomiting)     Retinal detachment      Past Surgical History:  Past Surgical History:   Procedure Laterality Date    APPENDECTOMY      CATARACT EXTRACTION      COLONOSCOPY      EYE SURGERY  2009    cataract, detatched retina    LIPOMA RESECTION      right ankle    LYMPH NODE BIOPSY      2/7/11 Dr Bryson Walton, Dr Leonora Chandler, sentinel lymph node biopsy x2 left posterior neck, lymphoscintigraphy, 12/28/10 [facility], Dr Jetty Duverney sclap excision-melanoma, resolved: 2/7/11      MITRAL VALVE REPAIR  2006    MECHANICAL HEART VALVE    RI EXC SKIN MALIG >4 CM FACE,FACIAL Left 11/2/2017    Procedure: CHEEK BASAL CELL CARCINOMA EXCISION; RECONSTRUCTION;  FROZEN SECTION;  Surgeon: Ozzie Magallon MD;  [facility];  Service: Plastics    RI EXC TUMOR SOFT TISSUE LEG/ANKLE SUBFASCIAL <3CM Right 12/15/2016    Procedure: EXCISION/RESECTION LOWER EXTREMITY MASS;  Surgeon: Ozzie Magallon MD;  Location:  MAIN OR;  Service: [address] Bilateral 1/14/2020    Procedure: REPAIR HERNIA Ronold Majors;  Surgeon: James Montana MD;  Location:  MAIN OR;  Service: General    RETINAL DETACHMENT SURGERY      resolved: 8/2006    SCALP EXCISION      Excision Of Lesion Scalp Malignant Over 4cm, resolved: 2/7/11    SKIN BIOPSY      SKIN GRAFT      autograft,scalp    TONSILLECTOMY       Family History:  Family History   Problem Relation Age of Onset    Basal cell carcinoma Mother     Colon cancer Mother     Hypertension Mother     Other Mother         Benign Polyps of the Large Intestine    Colon cancer Family     Substance Abuse Neg Hx     Mental illness Neg Hx      Social History:  Social History     Substance and Sexual Activity   Alcohol Use Not Currently    Frequency: Monthly or less     Social History     Substance and Sexual Activity   Drug Use No     Social History     Tobacco Use   Smoking Status Never Smoker   Smokeless Tobacco Never Used     Review of Systems   Constitutional: Negative  HENT: Negative  Eyes: Negative  Respiratory: Negative  Cardiovascular: Negative  Gastrointestinal: Negative  Endocrine: Negative  Genitourinary: Negative  Musculoskeletal: Positive for arthralgias (Left ankle), gait problem (using a walker) and joint swelling (Left ankle)  Skin: Negative  Hematological: Negative  Psychiatric/Behavioral: Negative  Objective:  BP Readings from Last 1 Encounters:   05/06/21 108/67      Wt Readings from Last 1 Encounters:   05/06/21 86 2 kg (190 lb)      BMI:   Estimated body mass index is 25 77 kg/m² as calculated from the following:    Height as of this encounter: 6' (1 829 m)  Weight as of this encounter: 86 2 kg (190 lb)  BSA:   Estimated body surface area is 2 08 meters squared as calculated from the following:    Height as of this encounter: 6' (1 829 m)      Weight as of this encounter: 86 2 kg (190 lb)  Physical Exam  Vitals signs and nursing note reviewed  Constitutional:       General: He is not in acute distress  Appearance: Normal appearance  He is well-developed  HENT:      Head: Normocephalic and atraumatic  Right Ear: External ear normal       Left Ear: External ear normal    Eyes:      Extraocular Movements: Extraocular movements intact  Conjunctiva/sclera: Conjunctivae normal    Neck:      Musculoskeletal: Neck supple  Pulmonary:      Effort: Pulmonary effort is normal  No respiratory distress  Musculoskeletal:      Left knee: He exhibits no effusion  Skin:     General: Skin is warm and dry  Neurological:      Mental Status: He is alert and oriented to person, place, and time  Psychiatric:         Mood and Affect: Mood normal          Behavior: Behavior normal        Left Ankle Exam     Tenderness   The patient is experiencing tenderness in the medial malleolus  Swelling: mild    Range of Motion   Dorsiflexion: abnormal   Plantar flexion: abnormal   Eversion: abnormal   Inversion: abnormal     Other   Erythema: absent  Scars: absent  Sensation: normal  Pulse: present    Comments:   Can move all toes   range of motion strength not assessed   calf soft, compressible      Left Knee Exam     Tenderness   Left knee tenderness location: Proximal fibula shaft  Range of Motion   The patient has normal left knee ROM  Other   Erythema: absent  Scars: absent  Sensation: normal  Pulse: present  Swelling: none  Effusion: no effusion present            I have personally reviewed pertinent films in PACS and my interpretation is X-rays left ankle reveal nondisplaced medial malleolus and posterior malleolus fractures  Mortise and syndesmosis well aligned  X-rays of the left tibia and fibula reveal a nondisplaced proximal fibula shaft fracture       Fracture / Dislocation Treatment    Date/Time: 5/6/2021 4:17 PM  Performed by: Dieter Welch MD  Authorized by: Karen Mabry MD     Patient Location:  Clinic  Verbal consent obtained?: Yes    Risks and benefits: Risks, benefits and alternatives were discussed    Consent given by:  Patient  Patient states understanding of procedure being performed: Yes    Injury location:  Ankle  Location details:  Left ankle  Injury type:  Fracture  Fracture type: bimalleolar    Neurovascular status: Neurovascularly intact    Distal perfusion: normal    Neurological function: normal    Range of motion: reduced    Local anesthesia used?: No    Manipulation performed?: No    Immobilization:  Cast  Cast type:  Short leg  Supplies used:  Cotton padding and fiberglass  Neurovascular status: Neurovascularly intact    Distal perfusion: normal    Neurological function: normal    Range of motion: unchanged    Patient tolerance:  Patient tolerated the procedure well with no immediate complications  Fracture / Dislocation Treatment    Date/Time: 5/6/2021 4:18 PM  Performed by: Karen Mabry MD  Authorized by: Karen Mabry MD     Verbal consent obtained?: Yes    Risks and benefits: Risks, benefits and alternatives were discussed    Consent given by:  Patient  Patient states understanding of procedure being performed: Yes    Injury location:  Lower leg  Location details:  Left lower leg  Injury type:  Fracture  Fracture type: fibular shaft    Neurovascular status: Neurovascularly intact    Distal perfusion: normal    Neurological function: normal    Range of motion: normal    Local anesthesia used?: No    General anesthesia used?: No    Manipulation performed?: No    Neurovascular status: Neurovascularly intact    Distal perfusion: normal    Neurological function: normal    Range of motion: normal    Patient tolerance:  Patient tolerated the procedure well with no immediate complications

## 2021-05-13 ENCOUNTER — OFFICE VISIT (OUTPATIENT)
Dept: OBGYN CLINIC | Facility: CLINIC | Age: 71
End: 2021-05-13

## 2021-05-13 ENCOUNTER — APPOINTMENT (OUTPATIENT)
Dept: RADIOLOGY | Facility: CLINIC | Age: 71
End: 2021-05-13
Payer: COMMERCIAL

## 2021-05-13 VITALS — TEMPERATURE: 97.5 F | DIASTOLIC BLOOD PRESSURE: 80 MMHG | SYSTOLIC BLOOD PRESSURE: 118 MMHG

## 2021-05-13 DIAGNOSIS — S82.55XD CLOSED NONDISPLACED FRACTURE OF MEDIAL MALLEOLUS OF LEFT TIBIA WITH ROUTINE HEALING, SUBSEQUENT ENCOUNTER: Primary | ICD-10-CM

## 2021-05-13 DIAGNOSIS — S82.55XD CLOSED NONDISPLACED FRACTURE OF MEDIAL MALLEOLUS OF LEFT TIBIA WITH ROUTINE HEALING, SUBSEQUENT ENCOUNTER: ICD-10-CM

## 2021-05-13 DIAGNOSIS — S82.832D CLOSED TRAUMATIC NONDISPLACED FRACTURE OF PROXIMAL END OF LEFT FIBULA WITH ROUTINE HEALING, SUBSEQUENT ENCOUNTER: ICD-10-CM

## 2021-05-13 DIAGNOSIS — S82.392D: ICD-10-CM

## 2021-05-13 PROCEDURE — 99024 POSTOP FOLLOW-UP VISIT: CPT | Performed by: ORTHOPAEDIC SURGERY

## 2021-05-13 PROCEDURE — 73610 X-RAY EXAM OF ANKLE: CPT

## 2021-05-13 NOTE — PROGRESS NOTES
Assessment:     1  Closed nondisplaced fracture of medial malleolus of left tibia with routine healing, subsequent encounter    2  Closed traumatic nondisplaced fracture of posterior malleolus of left tibia with routine healing, subsequent encounter    3  Closed traumatic nondisplaced fracture of proximal end of left fibula with routine healing, subsequent encounter        Plan:     Problem List Items Addressed This Visit        Musculoskeletal and Integument    Closed nondisplaced fracture of medial malleolus of left tibia - Primary    Relevant Orders    XR ankle 3+ vw left    Closed traumatic nondisplaced fracture of posterior malleolus of left tibia    Relevant Orders    XR ankle 3+ vw left    Closed traumatic nondisplaced fracture of proximal end of left fibula           The patient was seen and examined by Dr Ramirez Fearing and myself  Findings consistent with left nondisplaced medial malleolus and posterior malleolus fractures of the tibia and nondisplaced proximal fibula shaft fractures  Findings and treatment options were discussed with the patient  X-rays were reviewed with the patient and showed stable fractures with no displacement compared to last films  Continue nonweightbearing to the left lower extremity with short-leg cast  Continue ice, elevation and NSAIDs as needed for pain  Follow-up in 5 weeks, out of cast and x-rays  All patient's questions were answered to his satisfaction  This note is created using dictation transcription  It may contain typographical errors, grammatical errors, improperly dictated words, background noise and other errors  Subjective:     Patient ID: Ailyn Reasons is a 65 y o  male  Chief Complaint: This is a 10-year-old white male left nondisplaced medial malleolus and posterior malleolus fractures of the tibia and nondisplaced proximal fibular shaft fracture  Patient had an injury on May 5, 2021  He fell going down the last 4 steps in his home into his basement     He was placed in a short-leg cast last visit  He remains nonweightbearing to left lower extremity with crutch scooter  Pain is well controlled with Tylenol      Allergy:  No Known Allergies  Medications:  all current active meds have been reviewed  Past Medical History:  Past Medical History:   Diagnosis Date    [facility] fibrillation [facility] [address] [facility]     Cancer ([facility] [address] )     malignant melanoma of skin    Cataract of right eye     removal cataract R eye    History of colonic polyps     Hypertension     Insomnia     Mitral valve disorder     history of mitral valve repair    PONV (postoperative nausea and vomiting)     Retinal detachment      Past Surgical History:  Past Surgical History:   Procedure Laterality Date    APPENDECTOMY      CATARACT EXTRACTION      COLONOSCOPY      EYE SURGERY  2009    cataract, detatched retina    LIPOMA RESECTION      right ankle    LYMPH NODE BIOPSY      2/7/11 Dr Bryson Walton, Dr Leonora Chandler, sentinel lymph node biopsy x2 left posterior neck, lymphoscintigraphy, 12/28/10 [facility], Dr Jetty Duverney sclap excision-melanoma, resolved: 2/7/11      MITRAL VALVE REPAIR  2006    MECHANICAL HEART VALVE    NC EXC SKIN MALIG >4 CM FACE,FACIAL Left 11/2/2017    Procedure: CHEEK BASAL CELL CARCINOMA EXCISION; RECONSTRUCTION;  FROZEN SECTION;  Surgeon: Ozzie Magallon MD;  [facility];  Service: Plastics    NC EXC TUMOR SOFT TISSUE LEG/ANKLE SUBFASCIAL <3CM Right 12/15/2016    Procedure: EXCISION/RESECTION LOWER EXTREMITY MASS;  Surgeon: Ozzie Maglalon MD;  [facility];  Service: Plastics    [address] Bilateral 1/14/2020    Procedure: REPAIR HERNIA INGUINAL, LAPAROSCOPIC;  Surgeon: Mckayla Ramirez MD;  [facility];  Service: General    RETINAL DETACHMENT SURGERY      resolved: 8/2006    SCALP EXCISION      Excision Of Lesion Scalp Malignant Over 4cm, resolved: 2/7/11    SKIN BIOPSY      SKIN GRAFT      autograft,scalp    TONSILLECTOMY       Family History:  Family History   Problem Relation Age of Onset    Basal cell carcinoma Mother     Colon cancer Mother     Hypertension Mother     Other Mother         Benign Polyps of the Large Intestine    Colon cancer Family     Substance Abuse Neg Hx     Mental illness Neg Hx      Social History:  Social History     Substance and Sexual Activity   Alcohol Use Not Currently    Frequency: Monthly or less     Social History     Substance and Sexual Activity   Drug Use No     Social History     Tobacco Use   Smoking Status Never Smoker   Smokeless Tobacco Never Used     Review of Systems   Constitutional: Negative  HENT: Negative  Eyes: Negative  Respiratory: Negative  Cardiovascular: Negative  Gastrointestinal: Negative  Endocrine: Negative  Genitourinary: Negative  Musculoskeletal: Positive for gait problem (using crutches) and joint swelling (Left ankle)  Negative for arthralgias (Left ankle)  Skin: Negative  Hematological: Negative  Psychiatric/Behavioral: Negative  Objective:  BP Readings from Last 1 Encounters:   05/13/21 118/80      Wt Readings from Last 1 Encounters:   05/06/21 86 2 kg (190 lb)      BMI:   Estimated body mass index is 25 77 kg/m² as calculated from the following:    Height as of 5/6/21: 6' (1 829 m)  Weight as of 5/6/21: 86 2 kg (190 lb)  BSA:   Estimated body surface area is 2 08 meters squared as calculated from the following:    Height as of 5/6/21: 6' (1 829 m)  Weight as of 5/6/21: 86 2 kg (190 lb)  Physical Exam  Vitals signs and nursing note reviewed  Constitutional:       General: He is not in acute distress  Appearance: Normal appearance  He is well-developed  HENT:      Head: Normocephalic and atraumatic  Right Ear: External ear normal       Left Ear: External ear normal    Eyes:      Extraocular Movements: Extraocular movements intact        Conjunctiva/sclera: Conjunctivae normal    Neck: Musculoskeletal: Neck supple  Pulmonary:      Effort: Pulmonary effort is normal  No respiratory distress  Musculoskeletal:      Left knee: He exhibits no effusion  Skin:     General: Skin is warm and dry  Neurological:      Mental Status: He is alert and oriented to person, place, and time  Psychiatric:         Mood and Affect: Mood normal          Behavior: Behavior normal        Left Ankle Exam     Other   Erythema: absent  Scars: absent  Sensation: normal    Comments:    Short-leg cast intact with no evidence of loosening   moving all toes   capillary refill brisk      Left Knee Exam     Tenderness   Left knee tenderness location: Proximal fibula shaft  Range of Motion   The patient has normal left knee ROM  Other   Erythema: absent  Scars: absent  Sensation: normal  Pulse: present  Swelling: none  Effusion: no effusion present            I have personally reviewed pertinent films in PACS and my interpretation is X-rays left ankle reveal nondisplaced medial malleolus and posterior malleolus fractures  Mortise and syndesmosis well aligned  No displacement compared to last films       Procedures

## 2021-06-17 ENCOUNTER — APPOINTMENT (OUTPATIENT)
Dept: RADIOLOGY | Facility: CLINIC | Age: 71
End: 2021-06-17
Payer: COMMERCIAL

## 2021-06-17 ENCOUNTER — OFFICE VISIT (OUTPATIENT)
Dept: OBGYN CLINIC | Facility: CLINIC | Age: 71
End: 2021-06-17

## 2021-06-17 VITALS — SYSTOLIC BLOOD PRESSURE: 112 MMHG | DIASTOLIC BLOOD PRESSURE: 72 MMHG

## 2021-06-17 DIAGNOSIS — S82.832D CLOSED TRAUMATIC NONDISPLACED FRACTURE OF PROXIMAL END OF LEFT FIBULA WITH ROUTINE HEALING, SUBSEQUENT ENCOUNTER: ICD-10-CM

## 2021-06-17 DIAGNOSIS — S82.392D: ICD-10-CM

## 2021-06-17 DIAGNOSIS — S82.55XD CLOSED NONDISPLACED FRACTURE OF MEDIAL MALLEOLUS OF LEFT TIBIA WITH ROUTINE HEALING, SUBSEQUENT ENCOUNTER: Primary | ICD-10-CM

## 2021-06-17 DIAGNOSIS — S82.55XD CLOSED NONDISPLACED FRACTURE OF MEDIAL MALLEOLUS OF LEFT TIBIA WITH ROUTINE HEALING, SUBSEQUENT ENCOUNTER: ICD-10-CM

## 2021-06-17 PROCEDURE — 73610 X-RAY EXAM OF ANKLE: CPT

## 2021-06-17 PROCEDURE — 99024 POSTOP FOLLOW-UP VISIT: CPT | Performed by: ORTHOPAEDIC SURGERY

## 2021-06-17 NOTE — PROGRESS NOTES
Assessment:     1  Closed nondisplaced fracture of medial malleolus of left tibia with routine healing, subsequent encounter    2  Closed traumatic nondisplaced fracture of posterior malleolus of left tibia with routine healing, subsequent encounter        Plan:     Problem List Items Addressed This Visit        Musculoskeletal and Integument    Closed nondisplaced fracture of medial malleolus of left tibia - Primary    Relevant Orders    XR ankle 3+ vw left    Closed traumatic nondisplaced fracture of posterior malleolus of left tibia           The patient was seen and examined by Dr Pan Lomeli and myself  Findings consistent with left nondisplaced medial malleolus and posterior malleolus fractures of the tibia and nondisplaced proximal fibula shaft fractures- healing  Findings and treatment options were discussed with the patient  X-rays were reviewed with the patient and showed stable fractures with no displacement compared to last films  Fracture line is still visible over the medial malleolus  Recommend slowly progressing weight-bearing  He was placed in a cam walker today  He may start at 50% weight-bearing for the next 2 weeks  If he has no increased pain he may progress to full weight-bearing after that  If he has pain he is to go back to nonweightbearing  He is to come out of the CAM walker at rest for gentle range of motion  He does not have to sleep with the Cam Walker on as well  He will follow-up in 4 weeks with repeat x-rays  He will most likely start physical therapy at that time  All patient's questions were answered to his satisfaction  This note is created using dictation transcription  It may contain typographical errors, grammatical errors, improperly dictated words, background noise and other errors  Subjective:     Patient ID: William Estrada is a 65 y o  male  Chief Complaint:   This is a 67-year-old white male left nondisplaced medial malleolus and posterior malleolus fractures of the tibia and nondisplaced proximal fibular shaft fracture  Patient had an injury on May 5, 2021  He fell going down the last 4 steps in his home into his basement  No issues with the short-leg cast  He remains nonweightbearing to left lower extremity with crutch scooter  He denies any pain      Allergy:  No Known Allergies  Medications:  all current active meds have been reviewed  Past Medical History:  Past Medical History:   Diagnosis Date    [facility] [address]     Cancer ([facility] [address] [facility]     malignant melanoma of skin    Cataract of right eye     removal cataract R eye    History of colonic polyps     Hypertension     Insomnia     Mitral valve disorder     history of mitral valve repair    PONV (postoperative nausea and vomiting)     Retinal detachment      Past Surgical History:  Past Surgical History:   Procedure Laterality Date    APPENDECTOMY      CATARACT EXTRACTION      COLONOSCOPY      EYE SURGERY  2009    cataract, detatched retina    LIPOMA RESECTION      right ankle    LYMPH NODE BIOPSY      2/7/11 Dr Gerard Faulkner, Dr Alistair Ellison, sentinel lymph node biopsy x2 left posterior neck, lymphoscintigraphy, 12/28/10 Dr Toyin Rudolph sclap excision-melanoma, resolved: 2/7/11      MITRAL VALVE REPAIR  2006    MECHANICAL HEART VALVE    NJ EXC SKIN MALIG >4 CM FACE,FACIAL Left 11/2/2017    Procedure: CHEEK BASAL CELL CARCINOMA EXCISION; RECONSTRUCTION;  FROZEN SECTION;  Surgeon: Ana Engel MD;  [facility];  Service: Plastics    NJ EXC TUMOR SOFT TISSUE LEG/ANKLE SUBFASCIAL <3CM Right 12/15/2016    Procedure: EXCISION/RESECTION LOWER EXTREMITY MASS;  Surgeon: Ana Engel MD;  [facility];  Service: Plastics    [address] Bilateral 1/14/2020    Procedure: REPAIR HERNIA [place];  Surgeon: Fidel Leger MD;  [facility];  Service: General    RETINAL DETACHMENT SURGERY      resolved: 8/2006   Shannon Martinez SCALP EXCISION      Excision Of Lesion Scalp Malignant Over 4cm, resolved: 2/7/11    SKIN BIOPSY      SKIN GRAFT      autograft,scalp    TONSILLECTOMY       Family History:  Family History   Problem Relation Age of Onset    Basal cell carcinoma Mother     Colon cancer Mother     Hypertension Mother     Other Mother         Benign Polyps of the Large Intestine    Colon cancer Family     Substance Abuse Neg Hx     Mental illness Neg Hx      Social History:  Social History     Substance and Sexual Activity   Alcohol Use Not Currently     Social History     Substance and Sexual Activity   Drug Use No     Social History     Tobacco Use   Smoking Status Never Smoker   Smokeless Tobacco Never Used     Review of Systems   Constitutional: Negative  HENT: Negative  Eyes: Negative  Respiratory: Negative  Cardiovascular: Negative  Gastrointestinal: Negative  Endocrine: Negative  Genitourinary: Negative  Musculoskeletal: Positive for gait problem (using walker) and joint swelling (Left ankle)  Negative for arthralgias (Left ankle)  Skin: Negative  Hematological: Negative  Psychiatric/Behavioral: Negative  Objective:  BP Readings from Last 1 Encounters:   06/17/21 112/72      Wt Readings from Last 1 Encounters:   05/06/21 86 2 kg (190 lb)      BMI:   Estimated body mass index is 25 77 kg/m² as calculated from the following:    Height as of 5/6/21: 6' (1 829 m)  Weight as of 5/6/21: 86 2 kg (190 lb)  BSA:   Estimated body surface area is 2 08 meters squared as calculated from the following:    Height as of 5/6/21: 6' (1 829 m)  Weight as of 5/6/21: 86 2 kg (190 lb)  Physical Exam  Vitals and nursing note reviewed  Constitutional:       General: He is not in acute distress  Appearance: Normal appearance  He is well-developed  HENT:      Head: Normocephalic and atraumatic        Right Ear: External ear normal       Left Ear: External ear normal    Eyes: Extraocular Movements: Extraocular movements intact  Conjunctiva/sclera: Conjunctivae normal    Pulmonary:      Effort: Pulmonary effort is normal  No respiratory distress  Musculoskeletal:      Cervical back: Neck supple  Left knee: No effusion  Skin:     General: Skin is warm and dry  Neurological:      Mental Status: He is alert and oriented to person, place, and time  Psychiatric:         Mood and Affect: Mood normal          Behavior: Behavior normal        Left Ankle Exam     Tenderness   The patient is experiencing no tenderness  Swelling: mild    Range of Motion   Dorsiflexion: abnormal   Plantar flexion: abnormal   Eversion: abnormal   Inversion: abnormal     Other   Erythema: absent  Scars: absent  Sensation: normal    Comments:    Moving all toes   capillary refill brisk   strength not assessed      Left Knee Exam     Tenderness   The patient is experiencing no tenderness  Range of Motion   The patient has normal left knee ROM  Other   Erythema: absent  Scars: absent  Sensation: normal  Pulse: present  Swelling: none  Effusion: no effusion present            I have personally reviewed pertinent films in PACS and my interpretation is X-rays left ankle reveal nondisplaced medial malleolus and posterior malleolus fractures with no evidence of displacement compared to last films  There is significant evidence of healing in the posterior malleolus fracture  Fracture line is still evident of medial malleolus  Procedures   Short-leg cast removed

## 2021-07-15 ENCOUNTER — OFFICE VISIT (OUTPATIENT)
Dept: OBGYN CLINIC | Facility: CLINIC | Age: 71
End: 2021-07-15

## 2021-07-15 ENCOUNTER — APPOINTMENT (OUTPATIENT)
Dept: RADIOLOGY | Facility: CLINIC | Age: 71
End: 2021-07-15
Payer: COMMERCIAL

## 2021-07-15 VITALS
BODY MASS INDEX: 25.06 KG/M2 | WEIGHT: 185 LBS | DIASTOLIC BLOOD PRESSURE: 70 MMHG | SYSTOLIC BLOOD PRESSURE: 116 MMHG | HEIGHT: 72 IN

## 2021-07-15 DIAGNOSIS — S82.55XD CLOSED NONDISPLACED FRACTURE OF MEDIAL MALLEOLUS OF LEFT TIBIA WITH ROUTINE HEALING, SUBSEQUENT ENCOUNTER: ICD-10-CM

## 2021-07-15 DIAGNOSIS — S82.832D CLOSED TRAUMATIC NONDISPLACED FRACTURE OF PROXIMAL END OF LEFT FIBULA WITH ROUTINE HEALING, SUBSEQUENT ENCOUNTER: ICD-10-CM

## 2021-07-15 DIAGNOSIS — S82.55XD CLOSED NONDISPLACED FRACTURE OF MEDIAL MALLEOLUS OF LEFT TIBIA WITH ROUTINE HEALING, SUBSEQUENT ENCOUNTER: Primary | ICD-10-CM

## 2021-07-15 DIAGNOSIS — S82.392D: ICD-10-CM

## 2021-07-15 PROCEDURE — 73610 X-RAY EXAM OF ANKLE: CPT

## 2021-07-15 PROCEDURE — 99024 POSTOP FOLLOW-UP VISIT: CPT | Performed by: ORTHOPAEDIC SURGERY

## 2021-07-15 PROCEDURE — 3008F BODY MASS INDEX DOCD: CPT | Performed by: ORTHOPAEDIC SURGERY

## 2021-07-15 NOTE — PROGRESS NOTES
Assessment:     1  Closed nondisplaced fracture of medial malleolus of left tibia with routine healing, subsequent encounter    2  Closed traumatic nondisplaced fracture of posterior malleolus of left tibia with routine healing, subsequent encounter    3  Closed traumatic nondisplaced fracture of proximal end of left fibula with routine healing, subsequent encounter        Plan:     Problem List Items Addressed This Visit        Musculoskeletal and Integument    Closed nondisplaced fracture of medial malleolus of left tibia - Primary    Relevant Orders    XR ankle 3+ vw left    Ambulatory referral to Physical Therapy    Durable Medical Equipment    Closed traumatic nondisplaced fracture of posterior malleolus of left tibia    Relevant Orders    Ambulatory referral to Physical Therapy    Durable Medical Equipment    Closed traumatic nondisplaced fracture of proximal end of left fibula           The patient was seen and examined by Dr Stuart Evans and myself  Findings consistent with left nondisplaced medial malleolus and posterior malleolus fractures of the tibia and nondisplaced proximal fibula shaft fractures- healing  Findings and treatment options were discussed with the patient  X-rays were reviewed with the patient that showed interval healing compared to last films  He is doing very well  We will transition him to an ASO ankle brace at this time  He will start formal physical therapy  Follow-up in 4-6 weeks for re-evaluation  All patient's questions were answered to his satisfaction  This note is created using dictation transcription  It may contain typographical errors, grammatical errors, improperly dictated words, background noise and other errors  Subjective:     Patient ID: Gavin Gonzalez is a 65 y o  male  Chief Complaint: This is a 77-year-old white male left nondisplaced medial malleolus and posterior malleolus fractures of the tibia and nondisplaced proximal fibular shaft fracture   Patient had an injury on May 5, 2021  He fell going down the last 4 steps in his home into his basement  He is doing very well  He has been ambulating with the Cam Walker  He started full weight-bearing 2 weeks ago with no increased pain      Allergy:  No Known Allergies  Medications:  all current active meds have been reviewed  Past Medical History:  Past Medical History:   Diagnosis Date    Atrial fibrillation ([address] )     Cancer ([facility] [address] )     malignant melanoma of skin    Cataract of right eye     removal cataract R eye    History of colonic polyps     Hypertension     Insomnia     Mitral valve disorder     history of mitral valve repair    PONV (postoperative nausea and vomiting)     Retinal detachment      Past Surgical History:  Past Surgical History:   Procedure Laterality Date    APPENDECTOMY      CATARACT EXTRACTION      COLONOSCOPY      EYE SURGERY  2009    cataract, detatched retina    LIPOMA RESECTION      right ankle    LYMPH NODE BIOPSY      2/7/11 Dr Megan Tinsley, Dr Lorri Mccarthy, sentinel lymph node biopsy x2 left posterior neck, lymphoscintigraphy, 12/28/10 Klaudia, Dr Gini Art sclap excision-melanoma, resolved: 2/7/11      MITRAL VALVE REPAIR  2006    MECHANICAL HEART VALVE    IA EXC SKIN MALIG >4 CM FACE,FACIAL Left 11/2/2017    Procedure: CHEEK BASAL CELL CARCINOMA EXCISION; RECONSTRUCTION;  FROZEN SECTION;  Surgeon: Sanjana Figueroa MD;  Location: [facility];  Service: Plastics    IA EXC TUMOR SOFT TISSUE LEG/ANKLE SUBFASCIAL <3CM Right 12/15/2016    Procedure: EXCISION/RESECTION LOWER EXTREMITY MASS;  Surgeon: Sanjana Figueroa MD;  Location: [facility];  Service: Plastics    IA [address] Bilateral 1/14/2020    Procedure: REPAIR HERNIA Toma Lan;  Surgeon: Esther Banuelos MD;  Location: [facility];  Service: General    RETINAL DETACHMENT SURGERY      resolved: 8/2006    SCALP EXCISION      Excision Of Lesion Scalp Malignant Over 4cm, resolved: 2/7/11    SKIN BIOPSY      SKIN GRAFT      autograft,scalp    TONSILLECTOMY       Family History:  Family History   Problem Relation Age of Onset    Basal cell carcinoma Mother     Colon cancer Mother     Hypertension Mother     Other Mother         Benign Polyps of the Large Intestine    Colon cancer Family     Substance Abuse Neg Hx     Mental illness Neg Hx      Social History:  Social History     Substance and Sexual Activity   Alcohol Use Not Currently     Social History     Substance and Sexual Activity   Drug Use No     Social History     Tobacco Use   Smoking Status Never Smoker   Smokeless Tobacco Never Used     Review of Systems   Constitutional: Negative  HENT: Negative  Eyes: Negative  Respiratory: Negative  Cardiovascular: Negative  Gastrointestinal: Negative  Endocrine: Negative  Genitourinary: Negative  Musculoskeletal: Positive for gait problem (CAM walker LLE)  Negative for arthralgias (Left ankle) and joint swelling (Left ankle)  Skin: Negative  Hematological: Negative  Psychiatric/Behavioral: Negative  Objective:  BP Readings from Last 1 Encounters:   07/15/21 116/70      Wt Readings from Last 1 Encounters:   07/15/21 83 9 kg (185 lb)      BMI:   Estimated body mass index is 25 09 kg/m² as calculated from the following:    Height as of this encounter: 6' (1 829 m)  Weight as of this encounter: 83 9 kg (185 lb)  BSA:   Estimated body surface area is 2 06 meters squared as calculated from the following:    Height as of this encounter: 6' (1 829 m)  Weight as of this encounter: 83 9 kg (185 lb)  Physical Exam  Vitals and nursing note reviewed  Constitutional:       General: He is not in acute distress  Appearance: Normal appearance  He is well-developed  HENT:      Head: Normocephalic and atraumatic        Right Ear: External ear normal       Left Ear: External ear normal    Eyes:      Extraocular Movements: Extraocular movements intact  Conjunctiva/sclera: Conjunctivae normal    Pulmonary:      Effort: Pulmonary effort is normal  No respiratory distress  Musculoskeletal:      Cervical back: Neck supple  Left knee: No effusion  Skin:     General: Skin is warm and dry  Neurological:      Mental Status: He is alert and oriented to person, place, and time  Psychiatric:         Mood and Affect: Mood normal          Behavior: Behavior normal        Left Ankle Exam     Tenderness   The patient is experiencing no tenderness  Swelling: none    Range of Motion   Dorsiflexion: abnormal   Plantar flexion: abnormal   Eversion: abnormal   Inversion: abnormal     Muscle Strength   Dorsiflexion:  4/5   Plantar flexion:  4/5     Other   Erythema: absent  Scars: absent  Sensation: normal  Pulse: present    Comments:    Moving all toes   capillary refill brisk        Left Knee Exam     Tenderness   The patient is experiencing no tenderness  Range of Motion   The patient has normal left knee ROM  Other   Erythema: absent  Scars: absent  Sensation: normal  Pulse: present  Swelling: none  Effusion: no effusion present            I have personally reviewed pertinent films in PACS and my interpretation is X-rays left ankle reveal further healing of the nondisplaced medial malleolus fracture  Posterior malleolus fracture healing with increased bone callus as well       Procedures

## 2021-07-22 ENCOUNTER — EVALUATION (OUTPATIENT)
Dept: PHYSICAL THERAPY | Facility: CLINIC | Age: 71
End: 2021-07-22
Payer: COMMERCIAL

## 2021-07-22 DIAGNOSIS — S82.392D: ICD-10-CM

## 2021-07-22 DIAGNOSIS — S82.55XD CLOSED NONDISPLACED FRACTURE OF MEDIAL MALLEOLUS OF LEFT TIBIA WITH ROUTINE HEALING, SUBSEQUENT ENCOUNTER: Primary | ICD-10-CM

## 2021-07-22 PROCEDURE — 97162 PT EVAL MOD COMPLEX 30 MIN: CPT | Performed by: PHYSICAL THERAPIST

## 2021-07-22 NOTE — PROGRESS NOTES
PT Evaluation     Today's date: 2021  Patient name: Georgina Lugo  : 1950  MRN: [id number]  Referring provider: Chano Gonzalez PA-C  Dx:   Encounter Diagnosis     ICD-10-CM    1  Closed nondisplaced fracture of medial malleolus of left tibia with routine healing, subsequent encounter  S82 55XD Ambulatory referral to Physical Therapy   2  Closed traumatic nondisplaced fracture of posterior malleolus of left tibia with routine healing, subsequent encounter  S82 392D Ambulatory referral to Physical Therapy                  Assessment  Assessment details: Pt is a 65y o  year old male coming to outpatient PT s/p L tibial medial and posterior malleolar fx s/p fall on 21  Pt presents with no pain, decreased L ankle ROM, decreased L ankle strength, mild edema, and overall decreased functional mobility  Pt would benefit from skilled PT services in order to address these deficits and reach maximum level of function  Thank you kindly for the referral!    Pt was issued an initial HEP with green tb for home use  Impairments: abnormal gait, abnormal or restricted ROM, activity intolerance, impaired balance, impaired physical strength, lacks appropriate home exercise program, pain with function and weight-bearing intolerance  Understanding of Dx/Px/POC: good   Prognosis: good    Goals  STG's ( 3-4 weeks)  1  Pt will be independent in HEP  2  Pt will have improved SLS to 20 seconds  LTG's ( 6- 8 weeks)  1  Improve FOTO score by 6-8 points  2  Pt will have improved L ankle strength by 1/2 grade  3  Pt will have improved L ankle AROM  4   Pt will return to full household functional activities    Plan  Patient would benefit from: PT eval and skilled physical therapy  Planned modality interventions: cryotherapy  Planned therapy interventions: joint mobilization, manual therapy, neuromuscular re-education, strengthening, stretching, therapeutic activities, therapeutic exercise, functional ROM exercises, flexibility and home exercise program  Frequency: 2x week  Duration in weeks: 6  Plan of Care beginning date: 2021  Plan of Care expiration date: 2021  Treatment plan discussed with: patient        Subjective Evaluation    History of Present Illness  Date of onset: 2021  Mechanism of injury: trauma  Mechanism of injury: Pt was carry a laundry basket down the steps and missed the last several steps and fell onto the concrete  Pt was casted for 6 weeks, then a CAM boot for 4 weeks, and an ASO brace for about 1 week  Pt reports his pain has been low and has been able to be active around his house  Pt is able to go up and down the steps, step over step and is able to walk community distances  Pt has returned to driving  Pt is able to walk over uneven surfaces in the grass      Hobbies: swimming, yard work  Gait: mild antalgic gait, decreased push off with L LE  Work: retired  Pain  At best pain ratin  At worst pain ratin  Location: L ankle    Social Support  Steps to enter house: yes  4  Stairs in house: yes   12  Lives in: multiple-level home  Lives with: spouse    Employment status: not working    Diagnostic Tests  X-ray: abnormal  Patient Goals  Patient goals for therapy: independence with ADLs/IADLs  Patient goal: to get back to my normal ambulatory ability; to walk the dog        Objective     Neurological Testing     Sensation     Ankle/Foot   Left Ankle/Foot   Intact: light touch    Right Ankle/Foot   Intact: light touch     Reflexes   Left   Patellar (L4): normal (2+)  Achilles (S1): normal (2+)    Right   Patellar (L4): normal (2+)  Achilles (S1): normal (2+)    Active Range of Motion   Left Ankle/Foot   Dorsiflexion (ke): 12 degrees   Plantar flexion: 45 degrees   Inversion: 30 degrees   Eversion: WFL    Right Ankle/Foot   Normal active range of motion    Additional Active Range of Motion Details  No TTP L ankle  Mild edema present upon visual inspection  (+) L gastroc muscle atrophy  SLS: R: 9 sec  L: 9 sec    Passive Range of Motion   Left Ankle/Foot    Dorsiflexion (ke): 14 degrees   Plantar flexion: 60 degrees   Inversion: 32 degrees   Eversion: WFL    Strength/Myotome Testing     Left Hip   Normal muscle strength    Right Hip   Normal muscle strength    Left Ankle/Foot   Dorsiflexion: 5  Plantar flexion: 3+  Inversion: 4+  Eversion: 4+    Right Ankle/Foot   Normal strength            Dx: s/p fall with L tibial medial and posterior malleolar fx  EPOC: 9/2/21  CO-MORBIDITIES: decreased vision R eye, cataract L eye, atrial fib, neuropathy?   PERSONAL FACTORS:  Precautions: exercise with ASO brace      Manuals 7/22            L ankle manual stretching             L ankle joint mobs             K tape- prn                          Neuro Re-Ed             L SLS             Mini squats on airex             L lunge onto bosu             L lat step ups             L step over             Sidestepping on foam             4 square hurdles             Tandem walking on foam             Ther Ex             HEP instruct -issue green tb Th 5'            treadmill             Standing HR             Stand gastroc stretch             1/2 kneeling lunge over 2nd MT             4 way ankle TB ex                                       Ther Activity                                       Gait Training                                       Modalities             Cp post tx

## 2021-07-26 ENCOUNTER — OFFICE VISIT (OUTPATIENT)
Dept: PHYSICAL THERAPY | Facility: CLINIC | Age: 71
End: 2021-07-26
Payer: COMMERCIAL

## 2021-07-26 DIAGNOSIS — S82.392D: ICD-10-CM

## 2021-07-26 DIAGNOSIS — S82.55XD CLOSED NONDISPLACED FRACTURE OF MEDIAL MALLEOLUS OF LEFT TIBIA WITH ROUTINE HEALING, SUBSEQUENT ENCOUNTER: Primary | ICD-10-CM

## 2021-07-26 PROCEDURE — 97112 NEUROMUSCULAR REEDUCATION: CPT

## 2021-07-26 PROCEDURE — 97110 THERAPEUTIC EXERCISES: CPT

## 2021-07-26 NOTE — PROGRESS NOTES
Daily Note     Today's date: 2021  Patient name: Michaela Mccullough  : 1950  MRN: [id number]  Referring provider: Trish Williamson PA-C  Dx:   Encounter Diagnosis     ICD-10-CM    1  Closed nondisplaced fracture of medial malleolus of left tibia with routine healing, subsequent encounter  S82 55XD    2  Closed traumatic nondisplaced fracture of posterior malleolus of left tibia with routine healing, subsequent encounter  S82 392D                   Subjective: Pt reports his ankle is doing well  States at home he walks around w/o the brace  Objective: See treatment diary below      Assessment: Tolerated treatment well w/ instruction of TE's per flow sheet    Patient w/ good ROM  Needs LE strengthening and work on balance  Plan: Continue per plan of care  Progress treatment as tolerated  Dx: s/p fall with L tibial medial and posterior malleolar fx  EPOC: 21  CO-MORBIDITIES: decreased vision R eye, cataract L eye, atrial fib, neuropathy?   PERSONAL FACTORS:  Precautions: exercise with ASO brace      Manuals            L ankle manual stretching  JK           L ankle joint mobs             K tape- prn               8'           Neuro Re-Ed             L SLS  3 10"           Mini squats on airex  5"x10           L lunge onto bosu  nv           L lat step ups  8" 10X           L step over  UP 8" 10X           Sidestepping on foam  3 laps           4 square hurdles   2X CW/CCW           Tandem walking on foam  3 lAPS           Ther Ex             HEP instruct -issue green tb Th 5'            treadmill  6' 1 8mph           Standing HR  20           Stand gastroc stretch  1'           1/2 kneeling lunge over 2nd MT  20"X3           4 way ankle TB ex  gtb  20X EA                                     Ther Activity                                       Gait Training                                       Modalities             Cp post tx

## 2021-07-28 ENCOUNTER — OFFICE VISIT (OUTPATIENT)
Dept: PHYSICAL THERAPY | Facility: CLINIC | Age: 71
End: 2021-07-28
Payer: COMMERCIAL

## 2021-07-28 DIAGNOSIS — S82.55XD CLOSED NONDISPLACED FRACTURE OF MEDIAL MALLEOLUS OF LEFT TIBIA WITH ROUTINE HEALING, SUBSEQUENT ENCOUNTER: Primary | ICD-10-CM

## 2021-07-28 DIAGNOSIS — S82.392D: ICD-10-CM

## 2021-07-28 PROCEDURE — 97110 THERAPEUTIC EXERCISES: CPT | Performed by: PHYSICAL THERAPIST

## 2021-07-28 PROCEDURE — 97140 MANUAL THERAPY 1/> REGIONS: CPT | Performed by: PHYSICAL THERAPIST

## 2021-07-28 PROCEDURE — 97112 NEUROMUSCULAR REEDUCATION: CPT | Performed by: PHYSICAL THERAPIST

## 2021-07-28 NOTE — PROGRESS NOTES
Daily Note     Today's date: 2021  Patient name: Filomena Contreras  : 1950  MRN: [id number]  Referring provider: Madeline Brandon PA-C  Dx:   Encounter Diagnosis     ICD-10-CM    1  Closed nondisplaced fracture of medial malleolus of left tibia with routine healing, subsequent encounter  S82 55XD    2  Closed traumatic nondisplaced fracture of posterior malleolus of left tibia with routine healing, subsequent encounter  S82 392D                   Subjective: Pt had no complaints or increased pain after last visit  Pt notices decreased balance and thinks it could be due to decreased vision in R eye  Objective: See treatment diary below      Assessment: Tolerated treatment well  Patient exhibited good technique with therapeutic exercises  Pt is challenged by SLS ex  Pt tolerated increased tb ex well  Plan: Continue per plan of care  Dx: s/p fall with L tibial medial and posterior malleolar fx  EPOC: 21  CO-MORBIDITIES: decreased vision R eye, cataract L eye, atrial fib, neuropathy?   PERSONAL FACTORS:  Precautions: exercise with ASO brace      Manuals           L ankle manual stretching  JK Th & MFR          L ankle joint mobs   th          K tape- prn               8' 8'          Neuro Re-Ed             L SLS  3 10" 3x10"          Mini squats on airex  5"x10 5"x10 RB          L lunge onto bosu  nv 5"x10          L lat step ups  8" 10X 8"x15          L step over  UP 8" 10X 8"x10          Sidestepping on foam  3 laps 3 laps          4 square hurdles   2X CW/CCW x2 ea dir          Tandem walking on foam  3 lAPS 3 laps          Ther Ex             HEP instruct -issue green tb Th 5'            treadmill  6' 1 8mph 7' 1 8 mph          Standing HR  20 20          Stand gastroc stretch  1' 1'          1/2 kneeling lunge over 2nd MT  20"X3           4 way ankle TB ex  gtb  20X EA BTB x15          Combo chair: soleus decel   [id number]          Up B feet, down on L ecc   x10          Ther Activity                                       Gait Training                                       Modalities             Cp post tx

## 2021-08-02 ENCOUNTER — OFFICE VISIT (OUTPATIENT)
Dept: PHYSICAL THERAPY | Facility: CLINIC | Age: 71
End: 2021-08-02
Payer: COMMERCIAL

## 2021-08-02 DIAGNOSIS — S82.55XD CLOSED NONDISPLACED FRACTURE OF MEDIAL MALLEOLUS OF LEFT TIBIA WITH ROUTINE HEALING, SUBSEQUENT ENCOUNTER: Primary | ICD-10-CM

## 2021-08-02 PROCEDURE — 97112 NEUROMUSCULAR REEDUCATION: CPT

## 2021-08-02 PROCEDURE — 97110 THERAPEUTIC EXERCISES: CPT

## 2021-08-02 NOTE — PROGRESS NOTES
Daily Note     Today's date: 2021  Patient name: Ruth Delong  : 1950  MRN: [id number]  Referring provider: Ying Torres PA-C  Dx:   Encounter Diagnosis     ICD-10-CM    1  Closed nondisplaced fracture of medial malleolus of left tibia with routine healing, subsequent encounter  S82 55XD                   Subjective: Pt w/ no new c/o's to offer  Objective: See treatment diary below      Assessment: Tolerated treatment well  Patient has improved ROM  Slight edema noted on L above where brace stops  Pt needs work on strength and balance  Plan: Continue per plan of care  Progress treatment as tolerated  Dx: s/p fall with L tibial medial and posterior malleolar fx  EPOC: 21  CO-MORBIDITIES: decreased vision R eye, cataract L eye, atrial fib, neuropathy?   PERSONAL FACTORS:  Precautions: exercise with ASO brace      Manuals  8/2         L ankle manual stretching  JK Th & MFR JK         L ankle joint mobs   th          K tape- prn               8' 8' 8'         Neuro Re-Ed             L SLS  3 10" 3x10" 3x10"         Mini squats on airex  5"x10 5"x10 RB 5"x10 RB         L lunge onto bosu  nv 5"x10 5"x10 bl         L lat step ups  8" 10X 8"x15 8"x15         L step over  UP 8" 10X 8"x10 8"x10         Sidestepping on foam  3 laps 3 laps 3 laps         4 square hurdles   2X CW/CCW x2 ea dir x2 dir         Tandem walking on foam  3 lAPS 3 laps 3 laps         Ther Ex             HEP instruct -issue green tb Th 5'            treadmill  6' 1 8mph 7' 1 8 mph 6'         Standing HR  20 20 20         Stand gastroc stretch  1' 1' 1'         1/2 kneeling lunge over 2nd MT  20"X3           4 way ankle TB ex  gtb  20X EA BTB x15 BTB x20         Combo chair: soleus decel   [id number]         Up B feet, down on L ecc   x10 x10         Ther Activity                                       Gait Training                                       Modalities             Cp post tx

## 2021-08-04 ENCOUNTER — OFFICE VISIT (OUTPATIENT)
Dept: PHYSICAL THERAPY | Facility: CLINIC | Age: 71
End: 2021-08-04
Payer: COMMERCIAL

## 2021-08-04 DIAGNOSIS — S82.55XD CLOSED NONDISPLACED FRACTURE OF MEDIAL MALLEOLUS OF LEFT TIBIA WITH ROUTINE HEALING, SUBSEQUENT ENCOUNTER: Primary | ICD-10-CM

## 2021-08-04 DIAGNOSIS — S82.392D: ICD-10-CM

## 2021-08-04 PROCEDURE — 97110 THERAPEUTIC EXERCISES: CPT | Performed by: PHYSICAL THERAPIST

## 2021-08-04 PROCEDURE — 97140 MANUAL THERAPY 1/> REGIONS: CPT | Performed by: PHYSICAL THERAPIST

## 2021-08-04 PROCEDURE — 97112 NEUROMUSCULAR REEDUCATION: CPT | Performed by: PHYSICAL THERAPIST

## 2021-08-04 NOTE — PROGRESS NOTES
Daily Note     Today's date: 2021  Patient name: Michaela Mccullough  : 1950  MRN: [id number]  Referring provider: Trish Williamson PA-C  Dx:   Encounter Diagnosis     ICD-10-CM    1  Closed nondisplaced fracture of medial malleolus of left tibia with routine healing, subsequent encounter  S82 55XD    2  Closed traumatic nondisplaced fracture of posterior malleolus of left tibia with routine healing, subsequent encounter  S82 392D                   Subjective: Pt reports he is feeling good today  Pt is compliant in HEP  " Pain hasn't been an issue"  Objective: See treatment diary below      Assessment: Tolerated treatment well  Patient exhibited good technique with therapeutic exercises  Pt is challenged by balance ex  Pt fatigues with eccentric lowering of his heel  Plan: Continue per plan of care  Continue gastroc strengthening and balance ex  Issued blue tb for home use  Dx: s/p fall with L tibial medial and posterior malleolar fx  EPOC: 21  CO-MORBIDITIES: decreased vision R eye, cataract L eye, atrial fib, neuropathy?   PERSONAL FACTORS:  Precautions: exercise with ASO brace      Manuals  8/        L ankle manual stretching  JK Th & MFR JK th        L ankle joint mobs   th  th        K tape- prn               8' 8' 8' 8'        Neuro Re-Ed             L SLS  3 10" 3x10" 3x10" 3x10"        Mini squats on airex  5"x10 5"x10 RB 5"x10 RB 5"x10 RB        L lunge onto bosu  nv 5"x10 5"x10 bl 5"x10        L lat step ups  8" 10X 8"x15 8"x15 8"x15        L step over  UP 8" 10X 8"x10 8"x10 8"x15        Sidestepping on foam  3 laps 3 laps 3 laps 4 laps        4 square hurdles   2X CW/CCW x2 ea dir x2 dir x2 ea dir        Tandem walking on foam  3 lAPS 3 laps 3 laps 3 laps        Ther Ex             HEP instruct -issue green tb Th 5'            treadmill  6' 1 8mph 7' 1 8 mph 6' 7' 2 0 mph        Standing HR  20 20 20 20        Stand gastroc stretch  1' 1' 1' 1'        1/2 kneeling lunge over 2nd MT  20"X3           4 way ankle TB ex  gtb  20X EA BTB x15 BTB x20 BTB x20        Combo chair: soleus decel   [id number]        Up B feet, down on L ecc   x10 x10 10        Ther Activity                                       Gait Training                                       Modalities             Cp post tx

## 2021-08-09 ENCOUNTER — OFFICE VISIT (OUTPATIENT)
Dept: PHYSICAL THERAPY | Facility: CLINIC | Age: 71
End: 2021-08-09
Payer: COMMERCIAL

## 2021-08-09 DIAGNOSIS — S82.55XD CLOSED NONDISPLACED FRACTURE OF MEDIAL MALLEOLUS OF LEFT TIBIA WITH ROUTINE HEALING, SUBSEQUENT ENCOUNTER: Primary | ICD-10-CM

## 2021-08-09 DIAGNOSIS — S82.392D: ICD-10-CM

## 2021-08-09 PROCEDURE — 97110 THERAPEUTIC EXERCISES: CPT

## 2021-08-09 PROCEDURE — 97112 NEUROMUSCULAR REEDUCATION: CPT

## 2021-08-09 NOTE — PROGRESS NOTES
Daily Note     Today's date: 2021  Patient name: Radha Garcias   : 1950  MRN: [id number]  Referring provider: Macario Miguel PA-C  Dx:   Encounter Diagnosis     ICD-10-CM    1  Closed nondisplaced fracture of medial malleolus of left tibia with routine healing, subsequent encounter  S82 55XD    2  Closed traumatic nondisplaced fracture of posterior malleolus of left tibia with routine healing, subsequent encounter  S82 392D                   Subjective: Pt reports no c/o's ankle pain  States the ankle is doing well  Objective: See treatment diary below      Assessment: Tolerated treatment well  Patient exhibited good technique with therapeutic exercises and would benefit from continued PT for cont'd work on balance and strength  Plan: Continue per plan of care  Progress treatment as tolerated  Dx: s/p fall with L tibial medial and posterior malleolar fx  EPOC: 21  CO-MORBIDITIES: decreased vision R eye, cataract L eye, atrial fib, neuropathy?   PERSONAL FACTORS:  Precautions: exercise with ASO brace      Manuals        L ankle manual stretching  JK Th & MFR JK th JK       L ankle joint mobs   th  th        K tape- prn               8' 8' 8' 8' 5'       Neuro Re-Ed             L SLS  3 10" 3x10" 3x10" 3x10" 3x10" gtf       Mini squats on airex  5"x10 5"x10 RB 5"x10 RB 5"x10 RB 5"x10 RB       L lunge onto bosu  nv 5"x10 5"x10 bl 5"x10 5"x10       L lat step ups  8" 10X 8"x15 8"x15 8"x15 8"x15       L step over  UP 8" 10X 8"x10 8"x10 8"x15 8"x15       Sidestepping on foam  3 laps 3 laps 3 laps 4 laps 2 laps       4 square hurdles   2X CW/CCW x2 ea dir x2 dir x2 ea dir x2 ea dir       Tandem walking on foam  3 lAPS 3 laps 3 laps 3 laps 3 laps       Ther Ex             HEP instruct -issue green tb Th 5'            treadmill  6' 1 8mph 7' 1 8 mph 6' 7' 2 0 mph 7' 2 2 mph       Standing HR  20 20 20 20 20       Stand gastroc stretch  1' 1' 1' 1' 1'       1/2 kneeling lunge over 2nd MT  20"X3           4 way ankle TB ex  gtb  20X EA BTB x15 BTB x20 BTB x20 hep       Combo chair: soleus decel   [id number]       Up B feet, down on L ecc   x10 x10 10 nv       Ther Activity                                       Gait Training                                       Modalities             Cp post tx

## 2021-08-11 ENCOUNTER — OFFICE VISIT (OUTPATIENT)
Dept: PHYSICAL THERAPY | Facility: CLINIC | Age: 71
End: 2021-08-11
Payer: COMMERCIAL

## 2021-08-11 DIAGNOSIS — S82.392D: ICD-10-CM

## 2021-08-11 DIAGNOSIS — S82.55XD CLOSED NONDISPLACED FRACTURE OF MEDIAL MALLEOLUS OF LEFT TIBIA WITH ROUTINE HEALING, SUBSEQUENT ENCOUNTER: Primary | ICD-10-CM

## 2021-08-11 PROCEDURE — 97112 NEUROMUSCULAR REEDUCATION: CPT

## 2021-08-11 PROCEDURE — 97110 THERAPEUTIC EXERCISES: CPT

## 2021-08-11 NOTE — PROGRESS NOTES
Daily Note     Today's date: 2021  Patient name: Courtney Campos  : 1950  MRN: [id number]  Referring provider: Heriberto Hearn PA-C  Dx:   Encounter Diagnosis     ICD-10-CM    1  Closed nondisplaced fracture of medial malleolus of left tibia with routine healing, subsequent encounter  S82 55XD    2  Closed traumatic nondisplaced fracture of posterior malleolus of left tibia with routine healing, subsequent encounter  S82 392D                   Subjective: Pt has no new c/o's to offer  Reports he has not worn the [employer] around home  Objective: See treatment diary below      Assessment: Tolerated treatment well  Patient exhibited good technique with therapeutic exercises and would benefit from continued PT for cont'd work on balance and ankle strength  Plan: Pt w/ on more visit scheduled a RA w/ possible DC NV  Dx: s/p fall with L tibial medial and posterior malleolar fx  EPOC: 21  CO-MORBIDITIES: decreased vision R eye, cataract L eye, atrial fib, neuropathy?   PERSONAL FACTORS:  Precautions: exercise with ASO brace      Manuals       L ankle manual stretching  JK Th & MFR JK th JK JK      L ankle joint mobs   th  th        K tape- prn               8' 8' 8' 8' 5' 8'      Neuro Re-Ed             L SLS  3 10" 3x10" 3x10" 3x10" 3x10" gtf 3x15" GTF      Mini squats on airex  5"x10 5"x10 RB 5"x10 RB 5"x10 RB 5"x10 RB 5"x10 RB      L lunge onto bosu  nv 5"x10 5"x10 bl 5"x10 5"x10 5" 10      L lat step ups  8" 10X 8"x15 8"x15 8"x15 8"x15 8"x15      L step over  UP 8" 10X 8"x10 8"x10 8"x15 8"x15 8"x15      Sidestepping on foam  3 laps 3 laps 3 laps 4 laps 2 laps 3 laps      4 square hurdles   2X CW/CCW x2 ea dir x2 dir x2 ea dir x2 ea dir X 2 dir ea      Tandem walking on foam  3 lAPS 3 laps 3 laps 3 laps 3 laps  3 laps      Ther Ex             HEP instruct -issue green tb Th 5'            treadmill  6' 1 8mph 7' 1 8 mph 6' 7' 2 0 mph 7' 2 2 mph 7' 2 2 mph Standing HR  20 20 20 20 20 np      Stand gastroc stretch  1' 1' 1' 1' 1' 1'      1/2 kneeling lunge over 2nd MT  20"X3           4 way ankle TB ex  gtb  20X EA BTB x15 BTB x20 BTB x20 hep       Combo chair: soleus decel   [id number]      Up B feet, down on L ecc   x10 x10 10 nv UL 10      Bird dip       5# KB 10x                                Gait Training                                       Modalities             Cp post tx

## 2021-08-17 ENCOUNTER — EVALUATION (OUTPATIENT)
Dept: PHYSICAL THERAPY | Facility: CLINIC | Age: 71
End: 2021-08-17
Payer: COMMERCIAL

## 2021-08-17 DIAGNOSIS — S82.55XD CLOSED NONDISPLACED FRACTURE OF MEDIAL MALLEOLUS OF LEFT TIBIA WITH ROUTINE HEALING, SUBSEQUENT ENCOUNTER: Primary | ICD-10-CM

## 2021-08-17 DIAGNOSIS — S82.392D: ICD-10-CM

## 2021-08-17 PROCEDURE — 97110 THERAPEUTIC EXERCISES: CPT | Performed by: PHYSICAL THERAPIST

## 2021-08-17 PROCEDURE — 97140 MANUAL THERAPY 1/> REGIONS: CPT | Performed by: PHYSICAL THERAPIST

## 2021-08-17 PROCEDURE — 97112 NEUROMUSCULAR REEDUCATION: CPT | Performed by: PHYSICAL THERAPIST

## 2021-08-17 NOTE — PROGRESS NOTES
PT Discharge    Today's date: 2021  Patient name: Barak Osuna  : 1950  MRN: [id number]  Referring provider: Johana Khan PA-C  Dx:   Encounter Diagnosis     ICD-10-CM    1  Closed nondisplaced fracture of medial malleolus of left tibia with routine healing, subsequent encounter  S82 55XD    2  Closed traumatic nondisplaced fracture of posterior malleolus of left tibia with routine healing, subsequent encounter  S82 392D                   Assessment  Assessment details: Since starting skilled PT, pain levels remain decreased, L ankle ROM and strength are WFL's with excellent functional progress  Recommend pt be discharged from skilled PT at this time  Understanding of Dx/Px/POC: good   Prognosis: good    Goals  STG's ( 3-4 weeks)  1  Pt will be independent in HEP-met  2  Pt will have improved SLS to 20 seconds- not met  LTG's ( 6- 8 weeks)  1  Improve FOTO score by 6-8 points-met  2  Pt will have improved L ankle strength by 1/2 grade-met  3  Pt will have improved L ankle AROM-met  4  Pt will return to full household functional activities-met    Plan  Frequency: D/c pt from skilled PT  Plan of Care beginning date: 2021  Plan of Care expiration date: 2021  Treatment plan discussed with: patient        Subjective Evaluation    History of Present Illness  Date of onset: 2021  Mechanism of injury: trauma  Mechanism of injury: Pt was carry a laundry basket down the steps and missed the last several steps and fell onto the concrete  Pt was casted for 6 weeks, then a CAM boot for 4 weeks, and an ASO brace for about 1 week  Pt reports his pain has been low and has been able to be active around his house  Pt is able to go up and down the steps, step over step and is able to walk community distances  Pt has returned to driving  Pt is able to walk over uneven surfaces in the grass  21: Pt is able to walk community distances without his brace  Pt is able to walk his dog 30'-60'   Pt is able perform yard work and walk over uneven grassy surfaces  Pt is still concerned with his balance  Pt is able to go up and down the steps reciprocally  Hobbies: swimming, yard work  Gait: normal gait pattern  Work: retired  Pain  At best pain ratin  At worst pain ratin  Location: L ankle    Social Support  Steps to enter house: yes  4  Stairs in house: yes   12  Lives in: multiple-level home  Lives with: spouse    Employment status: not working    Diagnostic Tests  X-ray: abnormal  Patient Goals  Patient goals for therapy: independence with ADLs/IADLs  Patient goal: to get back to my normal ambulatory ability; to walk the dog- met        Objective     Neurological Testing     Sensation     Ankle/Foot   Left Ankle/Foot   Intact: light touch    Right Ankle/Foot   Intact: light touch     Reflexes   Left   Patellar (L4): normal (2+)  Achilles (S1): normal (2+)    Right   Patellar (L4): normal (2+)  Achilles (S1): normal (2+)    Active Range of Motion   Left Ankle/Foot   Dorsiflexion (ke): 20 degrees   Plantar flexion: WFL  Inversion: WFL  Eversion: WFL    Right Ankle/Foot   Normal active range of motion    Additional Active Range of Motion Details  I E: No TTP L ankle  Mild edema present upon visual inspection  (+) L gastroc muscle atrophy  SLS: R: 9 sec  L: 9 sec    21: L: SLS: 14 seconds    Passive Range of Motion   Left Ankle/Foot    Dorsiflexion (ke): WFL  Plantar flexion: WFL  Inversion: WFL  Eversion: WFL    Strength/Myotome Testing     Left Hip   Normal muscle strength    Right Hip   Normal muscle strength    Left Ankle/Foot   Dorsiflexion: 5  Plantar flexion: 4  Inversion: 5  Eversion: 5    Right Ankle/Foot   Normal strength          Dx: s/p fall with L tibial medial and posterior malleolar fx  EPOC: 21  CO-MORBIDITIES: decreased vision R eye, cataract L eye, atrial fib, neuropathy?   PERSONAL FACTORS:  Precautions: exercise with ASO brace      Manuals      L ankle manual stretching  JK Th & MFR JK th JK JK th     L ankle joint mobs   th  th        Progress note        th       6' 8' 8' 8' 5' 8' 15'     Neuro Re-Ed             L SLS  3 10" 3x10" 3x10" 3x10" 3x10" gtf 3x15" GTF 3x15"     Mini squats on airex  5"x10 5"x10 RB 5"x10 RB 5"x10 RB 5"x10 RB 5"x10 RB      L lunge onto bosu  nv 5"x10 5"x10 bl 5"x10 5"x10 5" 10      L lat step ups  8" 10X 8"x15 8"x15 8"x15 8"x15 8"x15      L step over  UP 8" 10X 8"x10 8"x10 8"x15 8"x15 8"x15 8"x15     Sidestepping on foam  3 laps 3 laps 3 laps 4 laps 2 laps 3 laps 3 laps     4 square hurdles   2X CW/CCW x2 ea dir x2 dir x2 ea dir x2 ea dir X 2 dir ea      Tandem walking on foam  3 lAPS 3 laps 3 laps 3 laps 3 laps  3 laps 3 laps     Ther Ex             HEP instruct -issue green tb Th 5'            treadmill  6' 1 8mph 7' 1 8 mph 6' 7' 2 0 mph 7' 2 2 mph 7' 2 2 mph 9' 2 2 mph     Standing HR  20 20 20 20 20 np 20     Stand gastroc stretch  1' 1' 1' 1' 1' 1' 1'     1/2 kneeling lunge over 2nd MT  20"X3           4 way ankle TB ex  gtb  20X EA BTB x15 BTB x20 BTB x20 hep  BTB x20     Combo chair: soleus decel   [id number] 2WL3 x20     Up B feet, down on L ecc   x10 x10 10 nv UL 10 UL x10     Bird dip       5# KB 10x                                Gait Training                                       Modalities             Cp post tx

## 2021-08-19 ENCOUNTER — APPOINTMENT (OUTPATIENT)
Dept: RADIOLOGY | Facility: CLINIC | Age: 71
End: 2021-08-19
Payer: COMMERCIAL

## 2021-08-19 ENCOUNTER — OFFICE VISIT (OUTPATIENT)
Dept: OBGYN CLINIC | Facility: CLINIC | Age: 71
End: 2021-08-19

## 2021-08-19 VITALS
HEIGHT: 72 IN | BODY MASS INDEX: 25.19 KG/M2 | DIASTOLIC BLOOD PRESSURE: 80 MMHG | WEIGHT: 186 LBS | SYSTOLIC BLOOD PRESSURE: 118 MMHG

## 2021-08-19 DIAGNOSIS — S82.55XD CLOSED NONDISPLACED FRACTURE OF MEDIAL MALLEOLUS OF LEFT TIBIA WITH ROUTINE HEALING, SUBSEQUENT ENCOUNTER: Primary | ICD-10-CM

## 2021-08-19 DIAGNOSIS — S82.392D: ICD-10-CM

## 2021-08-19 DIAGNOSIS — S82.55XD CLOSED NONDISPLACED FRACTURE OF MEDIAL MALLEOLUS OF LEFT TIBIA WITH ROUTINE HEALING, SUBSEQUENT ENCOUNTER: ICD-10-CM

## 2021-08-19 DIAGNOSIS — S82.832D CLOSED TRAUMATIC NONDISPLACED FRACTURE OF PROXIMAL END OF LEFT FIBULA WITH ROUTINE HEALING, SUBSEQUENT ENCOUNTER: ICD-10-CM

## 2021-08-19 PROCEDURE — 73610 X-RAY EXAM OF ANKLE: CPT

## 2021-08-19 PROCEDURE — 99024 POSTOP FOLLOW-UP VISIT: CPT | Performed by: ORTHOPAEDIC SURGERY

## 2021-08-19 NOTE — PROGRESS NOTES
Assessment:     1  Closed nondisplaced fracture of medial malleolus of left tibia with routine healing, subsequent encounter    2  Closed traumatic nondisplaced fracture of proximal end of left fibula with routine healing, subsequent encounter    3  Closed traumatic nondisplaced fracture of posterior malleolus of left tibia with routine healing, subsequent encounter        Plan:     Problem List Items Addressed This Visit        Musculoskeletal and Integument    Closed nondisplaced fracture of medial malleolus of left tibia - Primary    Relevant Orders    XR ankle 3+ vw left    Closed traumatic nondisplaced fracture of posterior malleolus of left tibia    Closed traumatic nondisplaced fracture of proximal end of left fibula           The patient was seen and examined by Dr Mary Nixon and myself  Findings consistent with left nondisplaced medial malleolus and posterior malleolus fractures of the tibia and nondisplaced proximal fibula shaft fractures- healed  Findings and treatment options were discussed with the patient  X-rays were reviewed with the patient  He is doing excellent  Continue home exercises  Discussed he can take several months for the swelling to improve  Follow-up as needed if any problems arise  All patient's questions were answered to his satisfaction  This note is created using dictation transcription  It may contain typographical errors, grammatical errors, improperly dictated words, background noise and other errors  Subjective:     Patient ID: Patience Robin is a 66 y o  male  Chief Complaint: This is a 41-year-old white male left nondisplaced medial malleolus and posterior malleolus fractures of the tibia and nondisplaced proximal fibular shaft fracture  Patient had an injury on May 5, 2021  He fell going down the last 4 steps in his home into his basement  He is doing excellent  He arrives ambulating with no assistance  He no longer feels he needs a brace    He completed formal physical therapy and was discharged to home exercise program   He continues to do the home exercises on a regular basis  He does have some swelling by the end of the day, but it is improving overall      Allergy:  No Known Allergies  Medications:  all current active meds have been reviewed  Past Medical History:  Past Medical History:   Diagnosis Date    [facility] [address] [facility]     Cancer ([facility] [address] )     malignant melanoma of skin    Cataract of right eye     removal cataract R eye    History of colonic polyps     Hypertension     Insomnia     Mitral valve disorder     history of mitral valve repair    PONV (postoperative nausea and vomiting)     Retinal detachment      Past Surgical History:  Past Surgical History:   Procedure Laterality Date    APPENDECTOMY      CATARACT EXTRACTION      COLONOSCOPY      EYE SURGERY  2009    cataract, detatched retina    LIPOMA RESECTION      right ankle    LYMPH NODE BIOPSY      2/7/11 Dr Jenifer Campo, Dr Chung Florentino, sentinel lymph node biopsy x2 left posterior neck, lymphoscintigraphy, 12/28/10 Dr Jocelin Rudolph sclap excision-melanoma, resolved: 2/7/11      MITRAL VALVE REPAIR  2006    MECHANICAL HEART VALVE    MS EXC SKIN MALIG >4 CM FACE,FACIAL Left 11/2/2017    Procedure: CHEEK BASAL CELL CARCINOMA EXCISION; RECONSTRUCTION;  FROZEN SECTION;  Surgeon: Jia Rubio MD;  Location: [facility];  Service: Plastics    MS EXC TUMOR SOFT TISSUE LEG/ANKLE SUBFASCIAL <3CM Right 12/15/2016    Procedure: EXCISION/RESECTION LOWER EXTREMITY MASS;  Surgeon: Jia Rubio MD;  [facility]: [facility];  Service: Plastics    MS Franco Aldana 19 Bilateral 1/14/2020    Procedure: REPAIR HERNIA INGUINAL, LAPAROSCOPIC;  Surgeon: Fariha Vasquez MD;  Location: [facility];  Service: General    RETINAL DETACHMENT SURGERY      resolved: 8/2006    SCALP EXCISION      Excision Of Lesion Scalp Malignant Over 4cm, resolved: 2/7/11    SKIN BIOPSY  SKIN GRAFT      autograft,scalp    TONSILLECTOMY       Family History:  Family History   Problem Relation Age of Onset    Basal cell carcinoma Mother     Colon cancer Mother     Hypertension Mother     Other Mother         Benign Polyps of the Large Intestine    Colon cancer Family     Substance Abuse Neg Hx     Mental illness Neg Hx      Social History:  Social History     Substance and Sexual Activity   Alcohol Use Not Currently     Social History     Substance and Sexual Activity   Drug Use No     Social History     Tobacco Use   Smoking Status Never Smoker   Smokeless Tobacco Never Used     Review of Systems   Constitutional: Negative  HENT: Negative  Eyes: Negative  Respiratory: Negative  Cardiovascular: Negative  Gastrointestinal: Negative  Endocrine: Negative  Genitourinary: Negative  Musculoskeletal: Negative for arthralgias (Left ankle), gait problem and joint swelling (Left ankle)  Skin: Negative  Hematological: Negative  Psychiatric/Behavioral: Negative  Objective:  BP Readings from Last 1 Encounters:   08/19/21 118/80      Wt Readings from Last 1 Encounters:   08/19/21 84 4 kg (186 lb)      BMI:   Estimated body mass index is 25 23 kg/m² as calculated from the following:    Height as of this encounter: 6' (1 829 m)  Weight as of this encounter: 84 4 kg (186 lb)  BSA:   Estimated body surface area is 2 07 meters squared as calculated from the following:    Height as of this encounter: 6' (1 829 m)  Weight as of this encounter: 84 4 kg (186 lb)  Physical Exam  Vitals and nursing note reviewed  Constitutional:       General: He is not in acute distress  Appearance: Normal appearance  He is well-developed  HENT:      Head: Normocephalic and atraumatic  Right Ear: External ear normal       Left Ear: External ear normal    Eyes:      Extraocular Movements: Extraocular movements intact        Conjunctiva/sclera: Conjunctivae normal  Pulmonary:      Effort: Pulmonary effort is normal  No respiratory distress  Musculoskeletal:      Cervical back: Neck supple  Left knee: No effusion  Skin:     General: Skin is warm and dry  Neurological:      Mental Status: He is alert and oriented to person, place, and time  Psychiatric:         Mood and Affect: Mood normal          Behavior: Behavior normal        Left Ankle Exam     Tenderness   The patient is experiencing no tenderness  Swelling: none    Range of Motion   The patient has normal left ankle ROM  Muscle Strength   The patient has normal left ankle strength  Other   Erythema: absent  Scars: absent  Sensation: normal  Pulse: present    Comments:    Moving all toes   capillary refill brisk        Left Knee Exam     Tenderness   The patient is experiencing no tenderness  Range of Motion   The patient has normal left knee ROM  Other   Erythema: absent  Scars: absent  Sensation: normal  Pulse: present  Swelling: none  Effusion: no effusion present            I have personally reviewed pertinent films in PACS and my interpretation is X-rays left ankle reveal healed nondisplaced medial malleolus and posterior malleolus fractures       Procedures

## 2021-08-20 ENCOUNTER — APPOINTMENT (OUTPATIENT)
Dept: PHYSICAL THERAPY | Facility: CLINIC | Age: 71
End: 2021-08-20
Payer: COMMERCIAL

## 2021-11-20 LAB — HEMOCCULT STL QL IA: NEGATIVE

## 2021-11-22 ENCOUNTER — TELEPHONE (OUTPATIENT)
Dept: FAMILY MEDICINE CLINIC | Facility: CLINIC | Age: 71
End: 2021-11-22

## 2021-11-22 ENCOUNTER — HOSPITAL ENCOUNTER (OUTPATIENT)
Dept: ULTRASOUND IMAGING | Facility: HOSPITAL | Age: 71
Discharge: HOME | End: 2021-11-22
Payer: COMMERCIAL

## 2021-11-22 DIAGNOSIS — K40.31: ICD-10-CM

## 2021-11-22 PROCEDURE — 76870 US EXAM SCROTUM: CPT

## 2021-12-15 ENCOUNTER — HOSPITAL ENCOUNTER (EMERGENCY)
Facility: HOSPITAL | Age: 71
Discharge: HOME | End: 2021-12-15
Attending: EMERGENCY MEDICINE | Admitting: EMERGENCY MEDICINE
Payer: COMMERCIAL

## 2021-12-15 VITALS
DIASTOLIC BLOOD PRESSURE: 65 MMHG | OXYGEN SATURATION: 99 % | HEIGHT: 72 IN | TEMPERATURE: 98.7 F | BODY MASS INDEX: 25.19 KG/M2 | WEIGHT: 186 LBS | SYSTOLIC BLOOD PRESSURE: 124 MMHG | HEART RATE: 85 BPM | RESPIRATION RATE: 20 BRPM

## 2021-12-15 DIAGNOSIS — R58 BLEEDING: Primary | ICD-10-CM

## 2021-12-15 LAB
ALBUMIN SERPL BCP-MCNC: 3.4 G/DL (ref 3.5–5)
ALP SERPL-CCNC: 83 U/L (ref 46–116)
ALT SERPL W P-5'-P-CCNC: 19 U/L (ref 12–78)
ANION GAP SERPL CALCULATED.3IONS-SCNC: 10 MMOL/L (ref 4–13)
AST SERPL W P-5'-P-CCNC: 13 U/L (ref 5–45)
BASOPHILS # BLD AUTO: 0.01 THOUSANDS/ΜL (ref 0–0.1)
BASOPHILS NFR BLD AUTO: 0 % (ref 0–1)
BILIRUB SERPL-MCNC: 0.7 MG/DL (ref 0.2–1)
BUN SERPL-MCNC: 28 MG/DL (ref 5–25)
CALCIUM ALBUM COR SERPL-MCNC: 8.7 MG/DL (ref 8.3–10.1)
CALCIUM SERPL-MCNC: 8.2 MG/DL (ref 8.3–10.1)
CHLORIDE SERPL-SCNC: 105 MMOL/L (ref 100–108)
CO2 SERPL-SCNC: 25 MMOL/L (ref 21–32)
CREAT SERPL-MCNC: 1 MG/DL (ref 0.6–1.3)
EOSINOPHIL # BLD AUTO: 0.05 THOUSAND/ΜL (ref 0–0.61)
EOSINOPHIL NFR BLD AUTO: 1 % (ref 0–6)
ERYTHROCYTE [DISTWIDTH] IN BLOOD BY AUTOMATED COUNT: 13 % (ref 11.6–15.1)
GFR SERPL CREATININE-BSD FRML MDRD: 74 ML/MIN/1.73SQ M
GLUCOSE SERPL-MCNC: 136 MG/DL (ref 65–140)
HCT VFR BLD AUTO: 42.9 % (ref 36.5–49.3)
HGB BLD-MCNC: 13.6 G/DL (ref 12–17)
IMM GRANULOCYTES # BLD AUTO: 0.03 THOUSAND/UL (ref 0–0.2)
IMM GRANULOCYTES NFR BLD AUTO: 0 % (ref 0–2)
LIPASE SERPL-CCNC: 185 U/L (ref 73–393)
LYMPHOCYTES # BLD AUTO: 1.71 THOUSANDS/ΜL (ref 0.6–4.47)
LYMPHOCYTES NFR BLD AUTO: 20 % (ref 14–44)
MCH RBC QN AUTO: 30.6 PG (ref 26.8–34.3)
MCHC RBC AUTO-ENTMCNC: 31.7 G/DL (ref 31.4–37.4)
MCV RBC AUTO: 96 FL (ref 82–98)
MONOCYTES # BLD AUTO: 0.7 THOUSAND/ΜL (ref 0.17–1.22)
MONOCYTES NFR BLD AUTO: 8 % (ref 4–12)
NEUTROPHILS # BLD AUTO: 6.01 THOUSANDS/ΜL (ref 1.85–7.62)
NEUTS SEG NFR BLD AUTO: 71 % (ref 43–75)
NRBC BLD AUTO-RTO: 0 /100 WBCS
PLATELET # BLD AUTO: 285 THOUSANDS/UL (ref 149–390)
PMV BLD AUTO: 10.6 FL (ref 8.9–12.7)
POTASSIUM SERPL-SCNC: 4.3 MMOL/L (ref 3.5–5.3)
PROT SERPL-MCNC: 7.2 G/DL (ref 6.4–8.2)
RBC # BLD AUTO: 4.45 MILLION/UL (ref 3.88–5.62)
SODIUM SERPL-SCNC: 140 MMOL/L (ref 136–145)
WBC # BLD AUTO: 8.51 THOUSAND/UL (ref 4.31–10.16)

## 2021-12-15 PROCEDURE — 80053 COMPREHEN METABOLIC PANEL: CPT | Performed by: EMERGENCY MEDICINE

## 2021-12-15 PROCEDURE — 99281 EMR DPT VST MAYX REQ PHY/QHP: CPT | Performed by: EMERGENCY MEDICINE

## 2021-12-15 PROCEDURE — 36415 COLL VENOUS BLD VENIPUNCTURE: CPT

## 2021-12-15 PROCEDURE — 85025 COMPLETE CBC W/AUTO DIFF WBC: CPT | Performed by: EMERGENCY MEDICINE

## 2021-12-15 PROCEDURE — 83690 ASSAY OF LIPASE: CPT | Performed by: EMERGENCY MEDICINE

## 2021-12-15 PROCEDURE — 99283 EMERGENCY DEPT VISIT LOW MDM: CPT

## 2021-12-20 ENCOUNTER — VBI (OUTPATIENT)
Dept: ADMINISTRATIVE | Facility: OTHER | Age: 71
End: 2021-12-20

## 2022-02-08 ENCOUNTER — OFFICE VISIT (OUTPATIENT)
Dept: FAMILY MEDICINE CLINIC | Facility: CLINIC | Age: 72
End: 2022-02-08
Payer: COMMERCIAL

## 2022-02-08 ENCOUNTER — TELEPHONE (OUTPATIENT)
Dept: ADMINISTRATIVE | Facility: OTHER | Age: 72
End: 2022-02-08

## 2022-02-08 VITALS
BODY MASS INDEX: 25.6 KG/M2 | OXYGEN SATURATION: 96 % | HEART RATE: 74 BPM | DIASTOLIC BLOOD PRESSURE: 78 MMHG | HEIGHT: 72 IN | WEIGHT: 189 LBS | SYSTOLIC BLOOD PRESSURE: 124 MMHG | RESPIRATION RATE: 16 BRPM

## 2022-02-08 DIAGNOSIS — G47.00 INSOMNIA, UNSPECIFIED TYPE: ICD-10-CM

## 2022-02-08 DIAGNOSIS — H25.012 CORTICAL AGE-RELATED CATARACT OF LEFT EYE: ICD-10-CM

## 2022-02-08 DIAGNOSIS — C43.9 MALIGNANT MELANOMA OF SKIN (HCC): ICD-10-CM

## 2022-02-08 DIAGNOSIS — Z00.00 MEDICARE ANNUAL WELLNESS VISIT, SUBSEQUENT: ICD-10-CM

## 2022-02-08 DIAGNOSIS — E78.2 MIXED HYPERLIPIDEMIA: Primary | ICD-10-CM

## 2022-02-08 DIAGNOSIS — Z01.818 PRE-OP EXAMINATION: ICD-10-CM

## 2022-02-08 DIAGNOSIS — I48.11 LONGSTANDING PERSISTENT ATRIAL FIBRILLATION (HCC): ICD-10-CM

## 2022-02-08 DIAGNOSIS — I10 PRIMARY HYPERTENSION: ICD-10-CM

## 2022-02-08 PROCEDURE — 1036F TOBACCO NON-USER: CPT | Performed by: FAMILY MEDICINE

## 2022-02-08 PROCEDURE — 3008F BODY MASS INDEX DOCD: CPT | Performed by: FAMILY MEDICINE

## 2022-02-08 PROCEDURE — 1160F RVW MEDS BY RX/DR IN RCRD: CPT | Performed by: FAMILY MEDICINE

## 2022-02-08 PROCEDURE — 1125F AMNT PAIN NOTED PAIN PRSNT: CPT | Performed by: FAMILY MEDICINE

## 2022-02-08 PROCEDURE — 3288F FALL RISK ASSESSMENT DOCD: CPT | Performed by: FAMILY MEDICINE

## 2022-02-08 PROCEDURE — 1101F PT FALLS ASSESS-DOCD LE1/YR: CPT | Performed by: FAMILY MEDICINE

## 2022-02-08 PROCEDURE — 3725F SCREEN DEPRESSION PERFORMED: CPT | Performed by: FAMILY MEDICINE

## 2022-02-08 PROCEDURE — 1170F FXNL STATUS ASSESSED: CPT | Performed by: FAMILY MEDICINE

## 2022-02-08 PROCEDURE — 99214 OFFICE O/P EST MOD 30 MIN: CPT | Performed by: FAMILY MEDICINE

## 2022-02-08 PROCEDURE — G0439 PPPS, SUBSEQ VISIT: HCPCS | Performed by: FAMILY MEDICINE

## 2022-02-08 RX ORDER — ZOLPIDEM TARTRATE 5 MG/1
5 TABLET ORAL
Qty: 30 TABLET | Refills: 5 | Status: SHIPPED | OUTPATIENT
Start: 2022-02-08

## 2022-02-08 NOTE — TELEPHONE ENCOUNTER
----- Message from [facility]""[facility] sent at 2/8/2022  8:42 AM EST -----  Regarding: Care Gap Request-COLOGUARD/FIT TEST  02/08/22 8:42 AM    Hello, our patient attached above has had CRC: Cologuard and CRC: FIT/FOBTx3 completed/performed  Please assist in updating the patient chart by making an External outreach to COLOGUARD/HOME FIT TEST facility located in [address] [place] [address] The date of service is [facility] 2021  Patient states that he submitted a cologuard AND a fit test and received notification that the results were final  He submitted them in either Oct/Nov of 2021  I cannot locate these results      Thank you,  Fariba Agrawal [facility]

## 2022-02-08 NOTE — PROGRESS NOTES
Assessment/Plan:    Pre-op examination  Clear cataract surg       Diagnoses and all orders for this visit:    Mixed hyperlipidemia  -     Lipid Panel with Direct LDL reflex; Future  -     PSA Total (Reflex To Free); Future  -     Lipid Panel with Direct LDL reflex  -     PSA Total (Reflex To Free)    Longstanding persistent atrial fibrillation (HCC)    Primary hypertension    Pre-op examination    Cortical age-related cataract of left eye          Subjective:   Chief Complaint   Patient presents with    Medicare Wellness Visit        Patient ID: Chana Kimbrough is a 66 y o  male  Annual exam      The following portions of the patient's history were reviewed and updated as appropriate: allergies, current medications, past family history, past medical history, past social history, past surgical history and problem list     Review of Systems   Constitutional: Negative for fatigue, fever and unexpected weight change  HENT: Negative for congestion, sinus pain and sore throat  Eyes: Negative for visual disturbance  Respiratory: Negative for shortness of breath and wheezing  Cardiovascular: Negative for chest pain and palpitations  Gastrointestinal: Negative for abdominal pain, nausea and vomiting  Musculoskeletal: Negative  Negative for arthralgias and myalgias  Neurological: Negative for syncope, weakness and numbness  Psychiatric/Behavioral: Negative  Negative for confusion, dysphoric mood and suicidal ideas  Objective:  Vitals:    02/08/22 0804   BP: 124/78   Pulse: 74   Resp: 16   SpO2: 96%   Weight: 85 7 kg (189 lb)   Height: 6' (1 829 m)      Physical Exam  Constitutional:       Appearance: He is well-developed  HENT:      Right Ear: Ear canal normal  Tympanic membrane is not injected  Left Ear: Ear canal normal  Tympanic membrane is not injected  Nose: Nose normal    Eyes:      General:         Right eye: No discharge  Left eye: No discharge  Conjunctiva/sclera: Conjunctivae normal       Pupils: Pupils are equal, round, and reactive to light  Neck:      Thyroid: No thyromegaly  Cardiovascular:      Rate and Rhythm: Normal rate and regular rhythm  Heart sounds: Normal heart sounds  No murmur heard  Pulmonary:      Effort: Pulmonary effort is normal  No respiratory distress  Breath sounds: Normal breath sounds  No wheezing  Abdominal:      General: Bowel sounds are normal  There is no distension  Palpations: Abdomen is soft  Tenderness: There is no abdominal tenderness  Musculoskeletal:         General: Normal range of motion  Cervical back: Normal range of motion and neck supple  Lymphadenopathy:      Cervical: No cervical adenopathy  Skin:     General: Skin is warm and dry  Neurological:      Mental Status: He is alert and oriented to person, place, and time  He is not disoriented  Sensory: No sensory deficit  Gait: Gait normal       Deep Tendon Reflexes: Reflexes are normal and symmetric  Psychiatric:         Speech: Speech normal          Behavior: Behavior normal          Thought Content:  Thought content normal          Judgment: Judgment normal          Answers for HPI/ROS submitted by the patient on 2/6/2022  How would you rate your overall health?: good  Compared to last year, how is your physical health?: same  In general, how satisfied are you with your life?: satisfied  Compared to last year, how is your eyesight?: slightly worse  Compared to last year, how is your hearing?: same  Compared to last year, how is your emotional/mental health?: same  How often is anger a problem for you?: never, rarely  How often do you feel unusually tired/fatigued?: sometimes  In the past 7 days, how much pain have you experienced?: none  In the past 6 months, have you lost or gained 10 pounds without trying?: No  Additional Comments: right side inguinal hernia surgery completed 12/14/2021  One or more falls in the last year: Yes  Do you have trouble with the stairs inside or outside your home?: No  Does your home have working smoke alarms?: Yes  Does your home have a carbon monoxide monitor?: No  Which safety hazards (if any) have you experienced in your home? Please select all that apply : none  How would you describe your current diet?  Please select all that apply : Regular  In addition to prescription medications, are you taking any over-the-counter supplements?: Yes  If yes, what supplements are you taking?: Preservision AREDS 2, multi vitamin, B-complex, krill oil  Can you manage your medications?: Yes  Are you currently taking any opioid medications?: No  Can you walk and transfer into and out of your bed and chair?: Yes  Can you dress and groom yourself?: Yes  Can you bathe or shower yourself?: Yes  Can you feed yourself?: Yes  Can you do your laundry/ housekeeping?: Yes  Can you manage your money, pay your bills, and track your expenses?: Yes  Can you make your own meals?: Yes  Can you do your own shopping?: Yes  Within the last 12 months, have you had any hospitalizations or Emergency Department visits?: Yes  If yes, how many times have you been hospitalized within the past year?: 1-2  Additional Comments: hernia surgery 12/14/2021; post-op bleeding 12/15/2021  Do you have a living will?: Yes  Do you have a Durable POA (Power of ) for healthcare decisions?: Yes  Do you have an Advanced Directive for end of life decisions?: Yes  How often have you used an illegal drug (including marijuana) or a prescription medication for non-medical reasons in the past year?: never  What is the typical number of drinks you consume in a day?: 0  What is the typical number of drinks you consume in a week?: 0  How often did you have a drink containing alcohol in the past year?: never  How many drinks did you have on a typical day  when you were drinking in the past year?: 0  How often did you have 6 or more drinks on one occasion in the past year?: never

## 2022-02-08 NOTE — PATIENT INSTRUCTIONS

## 2022-02-08 NOTE — PROGRESS NOTES
Assessment and Plan:     Problem List Items Addressed This Visit        Cardiovascular and Mediastinum    Atrial fibrillation ([address] )    Hypertension       Other    Pre-op examination     Clear cataract surg           Other Visit Diagnoses     Mixed hyperlipidemia    -  Primary    Relevant Orders    Lipid Panel with Direct LDL reflex    PSA Total (Reflex To Free)    Cortical age-related cataract of left eye            BMI Counseling: Body mass index is 25 63 kg/m²  The BMI is above normal  Nutrition recommendations include decreasing portion sizes  Exercise recommendations include moderate physical activity 150 minutes/week  No pharmacotherapy was ordered  Rationale for BMI follow-up plan is due to patient being overweight or obese  Depression Screening and Follow-up Plan: Patient was screened for depression during today's encounter  They screened negative with a PHQ-2 score of 0  Preventive health issues were discussed with patient, and age appropriate screening tests were ordered as noted in patient's After Visit Summary  Personalized health advice and appropriate referrals for health education or preventive services given if needed, as noted in patient's After Visit Summary       History of Present Illness:     Patient presents for Medicare Annual Wellness visit    Patient Care Team:  Mirza Lara MD as PCP - MD Naseem Suazo MD     Problem List:     Patient Active Problem List   Diagnosis    Atrial fibrillation ([address] )    Malignant melanoma of [facility]    Familial hypercholesterolemia    Insomnia    Mitral valve disorder    Hypertension    Closed nondisplaced fracture of medial malleolus of left tibia    Closed traumatic nondisplaced fracture of posterior malleolus of left tibia    Closed traumatic nondisplaced fracture of proximal end of left fibula    Pre-op examination      Past Medical and Surgical History:     Past Medical History:   Diagnosis Date    At[facility] fibrillation [facility] )     Cancer ([facility] )     malignant melanoma of skin    Cataract of right eye     removal cataract R eye    History of colonic polyps     Hypertension     Insomnia     Mitral valve disorder     history of mitral valve repair    PONV (postoperative nausea and vomiting)     Retinal detachment      Past Surgical History:   Procedure Laterality Date    APPENDECTOMY      CARDIAC SURGERY  2006    mitral valve repair    CATARACT EXTRACTION      COLONOSCOPY      EYE SURGERY  2009    cataract, detatched retina    FRACTURE SURGERY  1957    left elbow    HERNIA REPAIR  2020; 2021    LIPOMA RESECTION      right ankle    LYMPH NODE BIOPSY      2/7/11 Dr Itmiaz Oneal, Dr Phylicia Caban, sentinel lymph node biopsy x2 left posterior neck, lymphoscintigraphy, 12/28/10 Klaudia, Dr Kaden Borjas sclap excision-melanoma, resolved: 2/7/11      MITRAL VALVE REPAIR  2006    MECHANICAL HEART VALVE    TX EXC SKIN MALIG >4 CM FACE,FACIAL Left 11/2/2017    Procedure: CHEEK BASAL CELL CARCINOMA EXCISION; RECONSTRUCTION;  FROZEN SECTION;  Surgeon: Mark Yang MD;  [facility];  Service: Plastics    TX EXC TUMOR SOFT TISSUE LEG/ANKLE SUBFASCIAL <3CM Right 12/15/2016    Procedure: EXCISION/RESECTION LOWER EXTREMITY MASS;  Surgeon: Mark Yang MD;  [facility];  Service: Plastics    TX [address] Bilateral 1/14/2020    Procedure: REPAIR HERNIA Elbe Perish;  Surgeon: Jase Taylor MD;  [facility];  Service: General    RETINAL DETACHMENT SURGERY      resolved: 8/2006    SCALP EXCISION      Excision Of Lesion Scalp Malignant Over 4cm, resolved: 2/7/11    SKIN BIOPSY      SKIN GRAFT      autograft,scalp    TONSILLECTOMY        Family History:     Family History   Problem Relation Age of Onset    Basal cell carcinoma Mother     Colon cancer Mother     Hypertension Mother     Other Mother         Benign Polyps of the Large Intestine    Dementia Mother     Arthritis Mother     Cancer Mother         colon cancer surgically removed    Colon cancer Family     Substance Abuse Neg Hx     Mental illness Neg Hx       Social History:     Social History     Socioeconomic History    Marital status: /Civil Union     Spouse name: Eddie Maher Number of children: 2    Years of education: None    Highest education level: None   Occupational History    Occupation: Retired   Tobacco Use    Smoking status: Never Smoker    Smokeless tobacco: Never Used   Vaping Use    Vaping Use: Never used   Substance and Sexual Activity    Alcohol use: No    Drug use: No    Sexual activity: Yes     Partners: Female     Birth control/protection: None   Other Topics Concern    None   Social History Narrative    None     Social Determinants of Health     Financial Resource Strain: Not on file   Food Insecurity: Not on file   Transportation Needs: Not on file   Physical Activity: Not on file   Stress: Not on file   Social Connections: Not on file   Intimate Partner Violence: Not on file   Housing Stability: Not on file      Medications and Allergies:     Current Outpatient Medications   Medication Sig Dispense Refill    hydrochlorothiazide (HYDRODIURIL) 25 mg tablet Take 12 5 mg by mouth daily      metoprolol succinate (TOPROL-XL) 50 mg 24 hr tablet Take 50 mg by mouth daily  3    pravastatin (PRAVACHOL) 40 mg tablet Take 40 mg by mouth daily      Xarelto 20 MG tablet 1 tablet daily      zolpidem (AMBIEN) 5 mg tablet Take 1 tablet (5 mg total) by mouth daily at bedtime as needed for sleep 30 tablet 5     No current facility-administered medications for this visit       No Known Allergies   Immunizations:     Immunization History   Administered Date(s) Administered    COVID-19 PFIZER VACCINE 0 3 ML IM 03/07/2021, 03/28/2021, 11/03/2021    INFLUENZA 10/03/2017, 09/04/2018, 10/06/2020, 10/06/2021    Influenza Split High Dose Preservative Free IM 09/30/2015, 09/16/2016    Influenza, seasonal, injectable 09/16/2014    Pneumococcal Polysaccharide PPV23 09/18/2013    Rabies-IM Human Diploid Cell Culture 08/22/2020, 08/25/2020, 08/29/2020, 09/05/2020    Tdap 08/22/2020    Zoster 10/22/2014    Zoster Vaccine Recombinant 09/30/2019, 02/07/2020    influenza, trivalent, adjuvanted 09/30/2019      Health Maintenance:         Topic Date Due    Hepatitis C Screening  Never done    Colorectal Cancer Screening  11/16/2021     There are no preventive care reminders to display for this patient  Medicare Health Risk Assessment:     /78   Pulse 74   Resp 16   Ht 6' (1 829 m)   Wt 85 7 kg (189 lb)   SpO2 96%   BMI 25 63 kg/m²      Gokul Hewitt is here for his Subsequent Wellness visit  Last Medicare Wellness visit information reviewed, patient interviewed, no change since last AWV  Health Risk Assessment:   Patient rates overall health as good  Patient feels that their physical health rating is same  Patient is satisfied with their life  Eyesight was rated as slightly worse  Hearing was rated as same  Patient feels that their emotional and mental health rating is same  Patients states they are never, rarely angry  Patient states they are sometimes unusually tired/fatigued  Pain experienced in the last 7 days has been none  Patient states that he has experienced no weight loss or gain in last 6 months  right side inguinal hernia surgery completed 12/14/2021    Depression Screening:   PHQ-2 Score: 0      Fall Risk Screening: In the past year, patient has experienced: history of falling in past year      Home Safety:  Patient does not have trouble with stairs inside or outside of their home  Patient has working smoke alarms and has no working carbon monoxide detector  Home safety hazards include: none  Nutrition:   Current diet is Regular  Medications:   Patient is currently taking over-the-counter supplements   OTC medications include: Preservision AREDS 2, multi vitamin, B-complex, krill oil  Patient is able to manage medications  Activities of Daily Living (ADLs)/Instrumental Activities of Daily Living (IADLs):   Walk and transfer into and out of bed and chair?: Yes  Dress and groom yourself?: Yes    Bathe or shower yourself?: Yes    Feed yourself? Yes  Do your laundry/housekeeping?: Yes  Manage your money, pay your bills and track your expenses?: Yes  Make your own meals?: Yes    Do your own shopping?: Yes    Previous Hospitalizations:   Any hospitalizations or ED visits within the last 12 months?: Yes    How many hospitalizations have you had in the last year?: 1-2    Hospitalization Comments: hernia surgery 2021; post-op bleeding 12/15/2021    Advance Care Planning:   Living will: Yes    Durable POA for healthcare: Yes    Advanced directive: Yes    Provider agrees with end of life decisions: Yes      Cognitive Screening:   Provider or family/friend/caregiver concerned regarding cognition?: No    PREVENTIVE SCREENINGS      Cardiovascular Screening:    General: Screening Not Indicated and History Lipid Disorder      Diabetes Screening:     General: Screening Current      Colorectal Cancer Screening:     General: Screening Current      Osteoporosis Screening:    General: Screening Not Indicated      Abdominal Aortic Aneurysm (AAA) Screening:    Risk factors include: age between 73-69 yo        Lung Cancer Screening:     General: Screening Not Indicated      Hepatitis C Screening:    General: Patient Declines    Screening, Brief Intervention, and Referral to Treatment (SBIRT)    Screening  Typical number of drinks in a day: 0  Typical number of drinks in a week: 0  Interpretation: Low risk drinking behavior      AUDIT-C Screenin) How often did you have a drink containing alcohol in the past year? never  2) How many drinks did you have on a typical day when you were drinking in the past year? 0  3) How often did you have 6 or more drinks on one occasion in the past year? never    AUDIT-C Score: 0  Interpretation: Score 0-3 (male): Negative screen for alcohol misuse    Single Item Drug Screening:  How often have you used an illegal drug (including marijuana) or a prescription medication for non-medical reasons in the past year? never    Single Item Drug Screen Score: 0  Interpretation: Negative screen for possible drug use disorder      Courtney Oliveira MD

## 2022-02-08 NOTE — TELEPHONE ENCOUNTER
Upon review of the In Basket request we Please see "Lab Tab" under Chart review    Any additional questions or concerns should be emailed to the Practice Liaisons via [email or web address] email, please do not reply via In Basket      Thank you  Rupinder Lopez MA

## 2022-02-21 ENCOUNTER — TELEPHONE (OUTPATIENT)
Dept: FAMILY MEDICINE CLINIC | Facility: CLINIC | Age: 72
End: 2022-02-21

## 2022-02-21 NOTE — TELEPHONE ENCOUNTER
----- Message from Larry Vanegas sent at 2/19/2022  8:51 AM EST -----  Regarding: Cologuard result  When I was in your office a few weeks ago for my annual wellness check, Dr Umberto Jefferson did not have the results of my Cologuard test which was completed last November  Please let me know the result  [id number] [address] [facility]Re[facility], [address] [place] [address]  [phone]  Thanks

## 2022-02-22 ENCOUNTER — TELEPHONE (OUTPATIENT)
Dept: FAMILY MEDICINE CLINIC | Facility: CLINIC | Age: 72
End: 2022-02-22

## 2022-03-14 ENCOUNTER — APPOINTMENT (OUTPATIENT)
Dept: RADIOLOGY | Facility: CLINIC | Age: 72
End: 2022-03-14
Payer: COMMERCIAL

## 2022-03-14 DIAGNOSIS — C43.9 MALIGNANT MELANOMA OF SKIN (HCC): ICD-10-CM

## 2022-03-14 PROCEDURE — 71046 X-RAY EXAM CHEST 2 VIEWS: CPT

## 2022-03-17 ENCOUNTER — TELEPHONE (OUTPATIENT)
Dept: FAMILY MEDICINE CLINIC | Facility: CLINIC | Age: 72
End: 2022-03-17

## 2022-03-19 LAB
CHOLEST SERPL-MCNC: 136 MG/DL (ref 100–199)
HDLC SERPL-MCNC: 39 MG/DL
LDH SERPL-CCNC: 163 IU/L (ref 121–224)
LDLC SERPL CALC-MCNC: 75 MG/DL (ref 0–99)
LDLC/HDLC SERPL: 1.9 RATIO (ref 0–3.6)
PSA SERPL-MCNC: 2.7 NG/ML (ref 0–4)
SL AMB REFLEX CRITERIA: NORMAL
SL AMB VLDL CHOLESTEROL CALC: 22 MG/DL (ref 5–40)
TRIGL SERPL-MCNC: 122 MG/DL (ref 0–149)

## 2022-03-21 ENCOUNTER — TELEPHONE (OUTPATIENT)
Dept: FAMILY MEDICINE CLINIC | Facility: CLINIC | Age: 72
End: 2022-03-21

## 2022-03-21 NOTE — TELEPHONE ENCOUNTER
----- Message from Xavier Castro MD sent at 3/20/2022  7:35 AM EDT -----  KRISHNA/LABS 12MOS---currnent labs good

## 2022-04-11 ENCOUNTER — OFFICE VISIT (OUTPATIENT)
Dept: FAMILY MEDICINE CLINIC | Facility: CLINIC | Age: 72
End: 2022-04-11
Payer: COMMERCIAL

## 2022-04-11 VITALS
RESPIRATION RATE: 16 BRPM | HEIGHT: 72 IN | OXYGEN SATURATION: 96 % | DIASTOLIC BLOOD PRESSURE: 76 MMHG | SYSTOLIC BLOOD PRESSURE: 124 MMHG | HEART RATE: 80 BPM | BODY MASS INDEX: 26.14 KG/M2 | WEIGHT: 193 LBS

## 2022-04-11 DIAGNOSIS — Z01.818 PRE-OP EXAMINATION: Primary | ICD-10-CM

## 2022-04-11 DIAGNOSIS — I48.0 PAROXYSMAL ATRIAL FIBRILLATION (HCC): ICD-10-CM

## 2022-04-11 DIAGNOSIS — H25.012 CORTICAL AGE-RELATED CATARACT OF LEFT EYE: ICD-10-CM

## 2022-04-11 PROCEDURE — 99214 OFFICE O/P EST MOD 30 MIN: CPT | Performed by: FAMILY MEDICINE

## 2022-04-11 PROCEDURE — 3008F BODY MASS INDEX DOCD: CPT | Performed by: FAMILY MEDICINE

## 2022-04-11 PROCEDURE — 1160F RVW MEDS BY RX/DR IN RCRD: CPT | Performed by: FAMILY MEDICINE

## 2022-04-11 PROCEDURE — 1036F TOBACCO NON-USER: CPT | Performed by: FAMILY MEDICINE

## 2022-04-11 NOTE — PROGRESS NOTES
Assessment/Plan:    Pre-op examination  Pt cleared for L cataract surg--EKG from Nov reviewed--stable--no further testing indic       Diagnoses and all orders for this visit:    Pre-op examination    Cortical age-related cataract of left eye    Paroxysmal atrial fibrillation (HCC)          Subjective:   Chief Complaint   Patient presents with    Pre-op Exam     cataract        Patient ID: Trudy Ortiz is a 66 y o  male  Pre-op cataract surg      The following portions of the patient's history were reviewed and updated as appropriate: allergies, current medications, past family history, past medical history, past social history, past surgical history and problem list     Review of Systems   Constitutional: Negative for fatigue, fever and unexpected weight change  HENT: Negative for congestion, sinus pain and sore throat  Eyes: Negative for visual disturbance  Respiratory: Negative for shortness of breath and wheezing  Cardiovascular: Negative for chest pain and palpitations  Gastrointestinal: Negative for abdominal pain, nausea and vomiting  Musculoskeletal: Negative  Negative for arthralgias and myalgias  Neurological: Negative for syncope, weakness and numbness  Psychiatric/Behavioral: Negative  Negative for confusion, dysphoric mood and suicidal ideas  Objective:  Vitals:    04/11/22 0752   BP: 124/76   Pulse: 80   Resp: 16   SpO2: 96%   Weight: 87 5 kg (193 lb)   Height: 6' (1 829 m)      Physical Exam  Constitutional:       Appearance: He is well-developed  HENT:      Right Ear: Ear canal normal  Tympanic membrane is not injected  Left Ear: Ear canal normal  Tympanic membrane is not injected  Nose: Nose normal    Eyes:      General:         Right eye: No discharge  Left eye: No discharge  Conjunctiva/sclera: Conjunctivae normal       Pupils: Pupils are equal, round, and reactive to light  Neck:      Thyroid: No thyromegaly     Cardiovascular:      Rate and Rhythm: Normal rate and regular rhythm  Heart sounds: Normal heart sounds  No murmur heard  Pulmonary:      Effort: Pulmonary effort is normal  No respiratory distress  Breath sounds: Normal breath sounds  No wheezing  Abdominal:      General: Bowel sounds are normal  There is no distension  Palpations: Abdomen is soft  Tenderness: There is no abdominal tenderness  Musculoskeletal:         General: Normal range of motion  Cervical back: Normal range of motion and neck supple  Lymphadenopathy:      Cervical: No cervical adenopathy  Skin:     General: Skin is warm and dry  Neurological:      Mental Status: He is alert and oriented to person, place, and time  He is not disoriented  Sensory: No sensory deficit  Gait: Gait normal       Deep Tendon Reflexes: Reflexes are normal and symmetric  Psychiatric:         Speech: Speech normal          Behavior: Behavior normal          Thought Content:  Thought content normal          Judgment: Judgment normal

## 2022-11-21 ENCOUNTER — HOSPITAL ENCOUNTER (EMERGENCY)
Facility: HOSPITAL | Age: 72
Discharge: HOME | End: 2022-11-21
Attending: EMERGENCY MEDICINE

## 2022-11-21 ENCOUNTER — APPOINTMENT (EMERGENCY)
Dept: CT IMAGING | Facility: HOSPITAL | Age: 72
End: 2022-11-21

## 2022-11-21 VITALS
SYSTOLIC BLOOD PRESSURE: 152 MMHG | HEART RATE: 70 BPM | WEIGHT: 190 LBS | TEMPERATURE: 97.9 F | RESPIRATION RATE: 18 BRPM | OXYGEN SATURATION: 99 % | BODY MASS INDEX: 25.18 KG/M2 | DIASTOLIC BLOOD PRESSURE: 74 MMHG | HEIGHT: 73 IN

## 2022-11-21 DIAGNOSIS — S09.90XA INJURY OF HEAD, INITIAL ENCOUNTER: ICD-10-CM
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
## 2022-11-21 NOTE — ED PROVIDER NOTES
Emergency Department Trauma Note  Denise Street 74 y o  male MRN: [id number]  Unit/Bed#: ED TR13/TR13B Encounter: [id number]      Trauma Alert: Trauma Acuity: Trauma Evaluation  Model of Arrival: Mode of Arrival: Other (Comment) (pt arrive by car) via    Trauma Team: Current Providers  Attending Provider: Elio Arana DO  Registered Nurse: Zeinab Lou RN  Consultants:     None      History of Present Illness     Chief Complaint:   Chief Complaint   Patient presents with   • Fall     Patient complaint of " sitting in chair and the chair collapsed , hit head on radiator , denies loc , on xarelto , approx 30 min ago "     HPI:  Denise Street is a 74 y o  male who presents with fall, head injury  Mechanism:Details of Incident: fall Injury Date: 11/21/22 Injury Time: 1100      Patient presents via private vehilce  pmh a fibb on xarelto  Patient complains of Fall at home just pta, chair collapsed accidentally, hit back of head, hematoma at scalp  GCS 15, no loc, no confusion or headache  Breath sounds equal  No crepitus or chest wall tenderness with compression over the chest  No pain or instability with compression over the pelvis  No bedside imaging required  Patient is stable to proceed to CT scan  Review of Systems   Constitutional: Negative for chills and fever  HENT: Negative for rhinorrhea and sore throat  Respiratory: Negative for shortness of breath  Cardiovascular: Negative for chest pain  Gastrointestinal: Negative for constipation, diarrhea, nausea and vomiting  Genitourinary: Negative for dysuria and frequency  Skin: Negative for rash  Neurological: Negative for headaches  All other systems reviewed and are negative        Historical Information     Immunizations:   Immunization History   Administered Date(s) Administered   • COVID-19 PFIZER VACCINE 0 3 ML IM 03/07/2021, 03/28/2021, 11/03/2021   • INFLUENZA 10/03/2017, 09/04/2018, 10/06/2020, 10/06/2021   • Influenza I have reviewed discharge instructions with the patient and parent  The patient and parent verbalized understanding. Split High Dose Preservative Free IM 09/30/2015, 09/16/2016   • Influenza, seasonal, injectable 09/16/2014   • Pneumococcal Polysaccharide PPV23 09/18/2013   • Rabies-IM Human Diploid Cell Culture 08/22/2020, 08/25/2020, 08/29/2020, 09/05/2020   • Tdap 08/22/2020   • Zoster 10/22/2014   • Zoster Vaccine Recombinant 09/30/2019, 02/07/2020   • influenza, trivalent, adjuvanted 09/30/2019       Past Medical History:   Diagnosis Date   • Atrial fibrillation ([facility] [address] )    • Cancer ([facility] [address] )     malignant melanoma of skin   • Cataract of right eye     removal cataract R eye   • History of colonic polyps    • Hypertension    • Insomnia    • Mitral valve disorder     history of mitral valve repair   • PONV (postoperative nausea and vomiting)    • Retinal detachment        Family History   Problem Relation Age of Onset   • Basal cell carcinoma Mother    • Colon cancer Mother    • Hypertension Mother    • Other Mother         Benign Polyps of the Large Intestine   • Dementia Mother    • Arthritis Mother    • Cancer Mother         colon cancer surgically removed   • Colon cancer Family    • Substance Abuse Neg Hx    • Mental illness Neg Hx      Past Surgical History:   Procedure Laterality Date   • APPENDECTOMY     • CARDIAC SURGERY  2006    mitral valve repair   • CATARACT EXTRACTION     • COLONOSCOPY     • EYE SURGERY  2009    cataract, detatched retina   • FRACTURE SURGERY  1957    left elbow   • HERNIA REPAIR  2020; 2021   • LIPOMA RESECTION      right ankle   • LYMPH NODE BIOPSY      2/7/11 Dr Khloe Henderson, Dr Roselia Gil, sentinel lymph node biopsy x2 left posterior neck, lymphoscintigraphy, 12/28/10 Dr Jennifer Rudolph sclap excision-melanoma, resolved: 2/7/11     • MITRAL VALVE REPAIR  2006    MECHANICAL HEART VALVE   • MA EXC SKIN MALIG >4 CM FACE,FACIAL Left 11/2/2017    Procedure: CHEEK BASAL CELL CARCINOMA EXCISION; RECONSTRUCTION;  FROZEN SECTION;  Surgeon: Mikey Camacho MD;  [facility]; Service: Plastics   • NV EXC TUMOR SOFT TISSUE LEG/ANKLE SUBFASCIAL <3CM Right 12/15/2016    Procedure: EXCISION/RESECTION LOWER EXTREMITY MASS;  Surgeon: Luis M Han MD;  [facility];  Service: Plastics   • NV [address] Bilateral 2020    Procedure: REPAIR HERNIA [place];  Surgeon: Vanesa Tate MD;  [facility];  Service: General   • RETINAL DETACHMENT SURGERY      resolved: 2006   • SCALP EXCISION      Excision Of Lesion Scalp Malignant Over 4cm, resolved: 11   • SKIN BIOPSY     • SKIN GRAFT      autograft,scalp   • TONSILLECTOMY       Social History     Tobacco Use   • Smoking status: Never   • Smokeless tobacco: Never   Vaping Use   • Vaping Use: Never used   Substance Use Topics   • Alcohol use: No   • Drug use: No     E-Cigarette/Vaping   • E-Cigarette Use Never User      E-Cigarette/Vaping Substances   • Nicotine No    • THC No    • CBD No    • Flavoring No    • Other No    • Unknown No        Family History: non-contributory    Meds/Allergies   Prior to Admission Medications   Prescriptions Last Dose Informant Patient Reported? Taking?    Xarelto 20 MG tablet   Yes No   Si tablet daily   hydrochlorothiazide (HYDRODIURIL) 25 mg tablet   Yes No   Sig: Take 12 5 mg by mouth daily   metoprolol succinate (TOPROL-XL) 50 mg 24 hr tablet   Yes No   Sig: Take 50 mg by mouth daily   pravastatin (PRAVACHOL) 40 mg tablet   Yes No   Sig: Take 40 mg by mouth daily   zolpidem (AMBIEN) 5 mg tablet   No No   Sig: Take 1 tablet (5 mg total) by mouth daily at bedtime as needed for sleep      Facility-Administered Medications: None       No Known Allergies    PHYSICAL EXAM    PE limited by: nothing    Objective   Vitals:   First set: Temperature: 97 9 °F (36 6 °C) (22 1124)  Pulse: 88 (22 1124)  Respirations: 18 (22 1124)  Blood Pressure: 162/93 (22 1124)  SpO2: 96 % (22 1124)    Primary Survey:   (A) Airway: patent  (B) Breathing: clear  (C) Circulation: Pulses:   normal  (D) Disabliity:  GCS Total:  15  (E) Expose:  Completed    Secondary Survey: (Click on Physical Exam tab above)  Physical Exam  Vitals and nursing note reviewed  Constitutional:       Appearance: He is well-developed  HENT:      Head: Normocephalic and atraumatic  Comments: Right scalp hematoma noted     Right Ear: External ear normal       Left Ear: External ear normal       Nose: Nose normal    Eyes:      Conjunctiva/sclera: Conjunctivae normal       Pupils: Pupils are equal, round, and reactive to light  Cardiovascular:      Rate and Rhythm: Normal rate and regular rhythm  Heart sounds: Normal heart sounds  Pulmonary:      Effort: Pulmonary effort is normal  No respiratory distress  Breath sounds: Normal breath sounds  No wheezing  Abdominal:      General: Bowel sounds are normal  There is no distension  Palpations: Abdomen is soft  Tenderness: There is no abdominal tenderness  Musculoskeletal:         General: No deformity  Normal range of motion  Cervical back: Normal range of motion and neck supple  No bony tenderness  No spinous process tenderness  Thoracic back: No bony tenderness  Lumbar back: No bony tenderness  Skin:     General: Skin is warm and dry  Findings: No rash  Neurological:      General: No focal deficit present  Mental Status: He is alert  GCS: GCS eye subscore is 4  GCS verbal subscore is 5  GCS motor subscore is 6  Sensory: No sensory deficit  Psychiatric:         Mood and Affect: Mood normal          Cervical spine cleared by clinical criteria? Yes     Invasive Devices     None                 Lab Results:   Results Reviewed     None                 Imaging Studies:   Direct to CT: Yes  TRAUMA - CT head wo contrast   Final Result by Mikel Mcelroy MD (11/21 4628)      No acute intracranial abnormality  Nonemergent findings above            Workstation performed: [id number]               Procedures  Procedures         ED Course           MDM  Number of Diagnoses or Management Options  Fall, initial encounter: new and requires workup  Injury of head, initial encounter: new and requires workup     Amount and/or Complexity of Data Reviewed  Tests in the radiology section of CPT®: ordered and reviewed    Patient Progress  Patient progress: improved          Disposition  [facility] Transfer: No  Final diagnoses:   Fall, initial encounter   Injury of head, initial encounter     Time reflects when diagnosis was documented in both MDM as applicable and the Disposition within this note     Time User Action Codes Description Comment    11/21/2022 12:11 PM Coretta Manley Add [W19  EFYB] Fall, initial encounter     11/21/2022 12:11 PM Coretta Manley Add [[id number]] Injury of head, initial encounter       ED Disposition     ED Disposition   Discharge    Condition   Stable    Date/Time   Mon Nov 21, 2022 12:11 PM    Comment   Melvi Luis discharge to home/self care  Follow-up Information    None       Discharge Medication List as of 11/21/2022 12:11 PM      CONTINUE these medications which have NOT CHANGED    Details   hydrochlorothiazide (HYDRODIURIL) 25 mg tablet Take 12 5 mg by mouth daily, Starting Fri 10/2/2020, Historical Med      metoprolol succinate (TOPROL-XL) 50 mg 24 hr tablet Take 50 mg by mouth daily, Starting Fri 8/9/2019, Historical Med      pravastatin (PRAVACHOL) 40 mg tablet Take 40 mg by mouth daily, Until Discontinued, Historical Med      Xarelto 20 MG tablet 1 tablet daily, Starting Fri 9/18/2020, Historical Med      zolpidem (AMBIEN) 5 mg tablet Take 1 tablet (5 mg total) by mouth daily at bedtime as needed for sleep, Starting Tue 2/8/2022, Normal           No discharge procedures on file      PDMP Review       Value Time User    PDMP Reviewed  Yes 1/14/2020 10:43 AM Yevgeniy Newman PA-C          ED Provider  Electronically Signed by Lara Lancaster DO  11/21/22 1636

## 2022-11-22 LAB
ATRIAL RATE: 73 BPM
QRS AXIS: 12 DEGREES
QRSD INTERVAL: 86 MS
QT INTERVAL: 376 MS
QTC INTERVAL: 433 MS
T WAVE AXIS: 60 DEGREES
VENTRICULAR RATE: 80 BPM

## 2023-01-23 DIAGNOSIS — C43.9 MALIGNANT MELANOMA OF SKIN (HCC): ICD-10-CM

## 2023-01-23 DIAGNOSIS — E78.2 MIXED HYPERLIPIDEMIA: Primary | ICD-10-CM

## 2023-01-23 DIAGNOSIS — I10 PRIMARY HYPERTENSION: ICD-10-CM

## 2023-01-31 LAB
ALBUMIN SERPL-MCNC: 4.2 G/DL (ref 3.7–4.7)
ALBUMIN/GLOB SERPL: 1.8 {RATIO} (ref 1.2–2.2)
ALP SERPL-CCNC: 80 IU/L (ref 44–121)
ALT SERPL-CCNC: 18 IU/L (ref 0–44)
AST SERPL-CCNC: 21 IU/L (ref 0–40)
BILIRUB SERPL-MCNC: 0.8 MG/DL (ref 0–1.2)
BUN SERPL-MCNC: 20 MG/DL (ref 8–27)
BUN/CREAT SERPL: 19 (ref 10–24)
CALCIUM SERPL-MCNC: 9.4 MG/DL (ref 8.6–10.2)
CHLORIDE SERPL-SCNC: 104 MMOL/L (ref 96–106)
CHOLEST SERPL-MCNC: 132 MG/DL (ref 100–199)
CO2 SERPL-SCNC: 23 MMOL/L (ref 20–29)
CREAT SERPL-MCNC: 1.04 MG/DL (ref 0.76–1.27)
EGFR: 75 ML/MIN/1.73
GLOBULIN SER-MCNC: 2.3 G/DL (ref 1.5–4.5)
GLUCOSE SERPL-MCNC: 85 MG/DL (ref 70–99)
HDLC SERPL-MCNC: 40 MG/DL
LDLC SERPL CALC-MCNC: 70 MG/DL (ref 0–99)
LDLC/HDLC SERPL: 1.8 RATIO (ref 0–3.6)
POTASSIUM SERPL-SCNC: 4.5 MMOL/L (ref 3.5–5.2)
PROT SERPL-MCNC: 6.5 G/DL (ref 6–8.5)
SL AMB VLDL CHOLESTEROL CALC: 22 MG/DL (ref 5–40)
SODIUM SERPL-SCNC: 142 MMOL/L (ref 134–144)
TRIGL SERPL-MCNC: 119 MG/DL (ref 0–149)

## 2023-02-10 ENCOUNTER — OFFICE VISIT (OUTPATIENT)
Dept: FAMILY MEDICINE CLINIC | Facility: CLINIC | Age: 73
End: 2023-02-10

## 2023-02-10 VITALS
WEIGHT: 192.6 LBS | RESPIRATION RATE: 16 BRPM | TEMPERATURE: 97.9 F | HEIGHT: 73 IN | DIASTOLIC BLOOD PRESSURE: 76 MMHG | HEART RATE: 98 BPM | BODY MASS INDEX: 25.53 KG/M2 | OXYGEN SATURATION: 98 % | SYSTOLIC BLOOD PRESSURE: 123 MMHG

## 2023-02-10 DIAGNOSIS — I48.0 PAROXYSMAL ATRIAL FIBRILLATION (HCC): ICD-10-CM

## 2023-02-10 DIAGNOSIS — C43.9 MALIGNANT MELANOMA OF SKIN (HCC): ICD-10-CM

## 2023-02-10 DIAGNOSIS — Z23 NEED FOR PNEUMOCOCCAL VACCINATION: ICD-10-CM

## 2023-02-10 DIAGNOSIS — G47.00 INSOMNIA, UNSPECIFIED TYPE: ICD-10-CM

## 2023-02-10 DIAGNOSIS — Z00.00 MEDICARE ANNUAL WELLNESS VISIT, SUBSEQUENT: Primary | ICD-10-CM

## 2023-02-10 DIAGNOSIS — I10 PRIMARY HYPERTENSION: ICD-10-CM

## 2023-02-10 DIAGNOSIS — Z23 ENCOUNTER FOR IMMUNIZATION: ICD-10-CM

## 2023-02-10 DIAGNOSIS — E78.2 MIXED HYPERLIPIDEMIA: ICD-10-CM

## 2023-02-10 RX ORDER — ZOLPIDEM TARTRATE 5 MG/1
5 TABLET ORAL
Qty: 30 TABLET | Refills: 5 | Status: SHIPPED | OUTPATIENT
Start: 2023-02-10

## 2023-02-10 NOTE — PROGRESS NOTES
Assessment and Plan:     Problem List Items Addressed This Visit        Cardiovascular and Mediastinum    Atrial fibrillation ([facility] [address] )    Hypertension       Musculoskeletal and Integument    Malignant melanoma of skin ([facility] [address] )   Other Visit Diagnoses     Medicare annual wellness visit, subsequent    -  Primary    Encounter for immunization        Relevant Orders    Pneumococcal Conjugate Vaccine 20-valent (PCV20)    Need for pneumococcal vaccination        Relevant Orders    Pneumococcal Conjugate Vaccine 20-valent (PCV20)    Mixed hyperlipidemia            BMI Counseling: Body mass index is 25 76 kg/m²  The BMI is above normal  Nutrition recommendations include decreasing portion sizes  Exercise recommendations include moderate physical activity 150 minutes/week  No pharmacotherapy was ordered  Rationale for BMI follow-up plan is due to patient being overweight or obese  Depression Screening and Follow-up Plan: Patient was screened for depression during today's encounter  They screened negative with a PHQ-2 score of 0  Preventive health issues were discussed with patient, and age appropriate screening tests were ordered as noted in patient's After Visit Summary  Personalized health advice and appropriate referrals for health education or preventive services given if needed, as noted in patient's After Visit Summary  History of Present Illness:     Patient presents for a Medicare Wellness Visit    HPI   Patient Care Team:  Jolinda Klinefelter, MD as PCP - General  MD Christel Carlin MD     Review of Systems:     Review of Systems   Constitutional: Negative for fatigue, fever and unexpected weight change  HENT: Negative for congestion, sinus pain and sore throat  Eyes: Negative for visual disturbance  Respiratory: Negative for shortness of breath and wheezing  Cardiovascular: Negative for chest pain and palpitations     Gastrointestinal: Negative for abdominal pain, nausea and vomiting  Musculoskeletal: Negative  Negative for arthralgias and myalgias  Neurological: Negative for syncope, weakness and numbness  Psychiatric/Behavioral: Negative  Negative for confusion, dysphoric mood and suicidal ideas          Problem List:     Patient Active Problem List   Diagnosis   • Atrial fibrillation ([facility] [address] )   • Malignant melanoma of skin ([facility] [address] )   • Familial hypercholesterolemia   • Insomnia   • Mitral valve disorder   • Hypertension   • Closed nondisplaced fracture of medial malleolus of left tibia   • Closed traumatic nondisplaced fracture of posterior malleolus of left tibia   • Closed traumatic nondisplaced fracture of proximal end of left fibula   • Pre-op examination      Past Medical and Surgical History:     Past Medical History:   Diagnosis Date   • Atrial fibrillation ([facility] [address] )    • Cancer ([facility] [address] )     malignant melanoma of skin   • Cataract of right eye     removal cataract R eye   • History of colonic polyps    • Hypertension    • Insomnia    • Mitral valve disorder     history of mitral valve repair   • PONV (postoperative nausea and vomiting)    • Retinal detachment      Past Surgical History:   Procedure Laterality Date   • APPENDECTOMY     • CARDIAC SURGERY  2006    mitral valve repair   • CATARACT EXTRACTION     • COLONOSCOPY     • EYE SURGERY  2009    cataract, detatched retina   • FRACTURE SURGERY  1957    left elbow   • HERNIA REPAIR  2020; 2021   • LIPOMA RESECTION      right ankle   • LYMPH NODE BIOPSY      2/7/11 Dr Lyric Martin, Dr Bobbi Vides, sentinel lymph node biopsy x2 left posterior neck, lymphoscintigraphy, 12/28/10 Dr Reyna Rudolph sclap excision-melanoma, resolved: 2/7/11     • MITRAL VALVE REPAIR  2006    MECHANICAL HEART VALVE   • HI EXC TUMOR SOFT TISSUE LEG/ANKLE SUBFASCIAL <5CM Right 12/15/2016    Procedure: EXCISION/RESECTION LOWER EXTREMITY MASS;  Surgeon: Geovani Guerrero MD;  Location: [facility];  Service: Plastics   • HI EXCISION MALIGNANT LESION F/E/E/N/L >4 0 CM Left 11/2/2017    Procedure: CHEEK BASAL CELL CARCINOMA EXCISION; RECONSTRUCTION;  FROZEN SECTION;  Surgeon: Shania Lovelace MD;  Location: [facility];  Service: Plastics   • NH LAPAROSCOPY SURG RPR INITIAL INGUINAL HERNIA Bilateral 1/14/2020    Procedure: REPAIR HERNIA Joan Rodriguez;  Surgeon: William Vallejo MD;  Location: [facility];  Service: General   • RETINAL DETACHMENT SURGERY      resolved: 8/2006   • SCALP EXCISION      Excision Of Lesion Scalp Malignant Over 4cm, resolved: 2/7/11   • SKIN BIOPSY     • SKIN GRAFT      autograft,scalp   • TONSILLECTOMY        Family History:     Family History   Problem Relation Age of Onset   • Basal cell carcinoma Mother    • Colon cancer Mother    • Hypertension Mother    • Other Mother         Benign Polyps of the Large Intestine   • Dementia Mother    • Arthritis Mother    • Cancer Mother         colon cancer surgically removed   • Colon cancer Family    • Substance Abuse Neg Hx    • Mental illness Neg Hx       Social History:     Social History     Socioeconomic History   • Marital status: /Civil Union     Spouse name: Yosef Guajardo   • Number of children: 2   • Years of education: None   • Highest education level: None   Occupational History   • Occupation: Retired   Tobacco Use   • Smoking status: Never   • Smokeless tobacco: Never   Vaping Use   • Vaping Use: Never used   Substance and Sexual Activity   • Alcohol use: No   • Drug use: No   • Sexual activity: Yes     Partners: Female     Birth control/protection: None   Other Topics Concern   • None   Social History Narrative   • None     Social Determinants of Health     Financial Resource Strain: Low Risk    • Difficulty of Paying Living Expenses: Not hard at all   Food Insecurity: Not on file   Transportation Needs: No Transportation Needs   • Lack of Transportation (Medical): No   • Lack of Transportation (Non-Medical):  No   Physical Activity: Not on file   Stress: Not on file   Social Connections: Not on file   Intimate Partner Violence: Not on file   Housing Stability: Not on file      Medications and Allergies:     Current Outpatient Medications   Medication Sig Dispense Refill   • hydrochlorothiazide (HYDRODIURIL) 25 mg tablet Take 12 5 mg by mouth daily     • metoprolol succinate (TOPROL-XL) 50 mg 24 hr tablet Take 50 mg by mouth daily  3   • pravastatin (PRAVACHOL) 40 mg tablet Take 40 mg by mouth daily     • Xarelto 20 MG tablet 1 tablet daily     • zolpidem (AMBIEN) 5 mg tablet Take 1 tablet (5 mg total) by mouth daily at bedtime as needed for sleep 30 tablet 5     No current facility-administered medications for this visit  No Known Allergies   Immunizations:     Immunization History   Administered Date(s) Administered   • COVID-19 PFIZER VACCINE 0 3 ML IM 03/07/2021, 03/28/2021, 11/03/2021   • COVID-19 Pfizer Vac BIVALENT Julio-sucrose 12 Yr+ IM (BOOSTER ONLY) 10/11/2022   • INFLUENZA 10/03/2017, 09/04/2018, 10/06/2020, 10/06/2021, 09/14/2022   • Influenza Split High Dose Preservative Free IM 09/30/2015, 09/16/2016   • Influenza, seasonal, injectable 09/16/2014   • Pneumococcal Polysaccharide PPV23 09/18/2013   • Rabies-IM Human Diploid Cell Culture 08/22/2020, 08/25/2020, 08/29/2020, 09/05/2020   • Tdap 08/22/2020   • Zoster 10/22/2014   • Zoster Vaccine Recombinant 09/30/2019, 02/07/2020   • influenza, trivalent, adjuvanted 09/30/2019      Health Maintenance:         Topic Date Due   • Hepatitis C Screening  Never done   • Colorectal Cancer Screening  11/16/2021         Topic Date Due   • Pneumococcal Vaccine: 65+ Years (2 - PCV) 09/18/2014   • COVID-19 Vaccine (4 - Booster for Schumacher Peter series) 12/06/2022      Medicare Screening Tests and Risk Assessments:     Sagar Gallegos is here for his Subsequent Wellness visit  Health Risk Assessment:   Patient rates overall health as very good  Patient feels that their physical health rating is slightly better   Patient is very satisfied with their life  Eyesight was rated as same  Hearing was rated as same  Patient feels that their emotional and mental health rating is slightly better  Patients states they are never, rarely angry  Patient states they are sometimes unusually tired/fatigued  Pain experienced in the last 7 days has been none  Patient states that he has experienced no weight loss or gain in last 6 months  eyesight better than last year: successful cataract surgery    Depression Screening:   PHQ-2 Score: 0      Fall Risk Screening: In the past year, patient has experienced: no history of falling in past year      Home Safety:  Patient does not have trouble with stairs inside or outside of their home  Patient has working smoke alarms and has no working carbon monoxide detector  Home safety hazards include: none  Nutrition:   Current diet is Regular and Limited junk food  Medications:   Patient is currently taking over-the-counter supplements  OTC medications include: Preservision AREDS 2; Centrum multivitamin; B complex  Patient is able to manage medications  Activities of Daily Living (ADLs)/Instrumental Activities of Daily Living (IADLs):   Walk and transfer into and out of bed and chair?: Yes  Dress and groom yourself?: Yes    Bathe or shower yourself?: Yes    Feed yourself? Yes  Do your laundry/housekeeping?: Yes  Manage your money, pay your bills and track your expenses?: Yes  Make your own meals?: Yes    Do your own shopping?: Yes    Durable Medical Equipment Suppliers  none    Previous Hospitalizations:   Any hospitalizations or ED visits within the last 12 months?: Yes    How many hospitalizations have you had in the last year?: 1-2    Hospitalization Comments: precautionary ER visit for possible head trauma due to chair collapse; CT scan showed no internal cranial bleeding in spite of blood thinner    Advance Care Planning:   Living will: Yes    Durable POA for healthcare: Yes    Advanced directive:  Yes Cognitive Screening:   Provider or family/friend/caregiver concerned regarding cognition?: No    PREVENTIVE SCREENINGS      Cardiovascular Screening:    General: Screening Not Indicated and History Lipid Disorder      Diabetes Screening:     General: Screening Current      Colorectal Cancer Screening:     General: Screening Current      Prostate Cancer Screening:    General: Screening Current      Osteoporosis Screening:    General: Risks and Benefits Discussed      Abdominal Aortic Aneurysm (AAA) Screening:    Risk factors include: age between 73-69 yo        General: Screening Not Indicated      Lung Cancer Screening:     General: Screening Not Indicated      Hepatitis C Screening:    General: Patient Declines    Screening, Brief Intervention, and Referral to Treatment (SBIRT)    Screening  Typical number of drinks in a day: 0  Typical number of drinks in a week: 0  Interpretation: Low risk drinking behavior  AUDIT-C Screenin) How often did you have a drink containing alcohol in the past year? never  2) How many drinks did you have on a typical day when you were drinking in the past year? 0  3) How often did you have 6 or more drinks on one occasion in the past year? never    AUDIT-C Score: 0  Interpretation: Score 0-3 (male): Negative screen for alcohol misuse    Single Item Drug Screening:  How often have you used an illegal drug (including marijuana) or a prescription medication for non-medical reasons in the past year? never    Single Item Drug Screen Score: 0  Interpretation: Negative screen for possible drug use disorder    No results found  Physical Exam:     /76 (BP Location: Left arm, Patient Position: Sitting, Cuff Size: Standard)   Pulse 98   Temp 97 9 °F (36 6 °C)   Resp 16   Ht 6' 0 5" (1 842 m)   Wt 87 4 kg (192 lb 9 6 oz)   SpO2 98%   BMI 25 76 kg/m²     Physical Exam  Vitals and nursing note reviewed  Constitutional:       General: He is not in acute distress  Appearance: He is well-developed  HENT:      Head: Normocephalic and atraumatic  Eyes:      Conjunctiva/sclera: Conjunctivae normal    Cardiovascular:      Rate and Rhythm: Normal rate and regular rhythm  Heart sounds: No murmur heard  Pulmonary:      Effort: Pulmonary effort is normal  No respiratory distress  Breath sounds: Normal breath sounds  Abdominal:      Palpations: Abdomen is soft  Tenderness: There is no abdominal tenderness  Musculoskeletal:         General: No swelling  Cervical back: Neck supple  Skin:     General: Skin is warm and dry  Capillary Refill: Capillary refill takes less than 2 seconds  Neurological:      Mental Status: He is alert     Psychiatric:         Mood and Affect: Mood normal           Nate Salinas MD

## 2023-02-10 NOTE — PATIENT INSTRUCTIONS
Medicare Preventive Visit Patient Instructions  Thank you for completing your Welcome to Medicare Visit or Medicare Annual Wellness Visit today  Your next wellness visit will be due in one year (2/11/2024)  The screening/preventive services that you may require over the next 5-10 years are detailed below  Some tests may not apply to you based off risk factors and/or age  Screening tests ordered at today's visit but not completed yet may show as past due  Also, please note that scanned in results may not display below  Preventive Screenings:  Service Recommendations Previous Testing/Comments   Colorectal Cancer Screening  · Colonoscopy    · Fecal Occult Blood Test (FOBT)/Fecal Immunochemical Test (FIT)  · Fecal DNA/Cologuard Test  · Flexible Sigmoidoscopy Age: 39-70 years old   Colonoscopy: every 10 years (May be performed more frequently if at higher risk)  OR  FOBT/FIT: every 1 year  OR  Cologuard: every 3 years  OR  Sigmoidoscopy: every 5 years  Screening may be recommended earlier than age 39 if at higher risk for colorectal cancer  Also, an individualized decision between you and your healthcare provider will decide whether screening between the ages of 74-80 would be appropriate   Colonoscopy: 08/19/2015  FOBT/FIT: 11/15/2021  Cologuard: 11/15/2021  Sigmoidoscopy: Not on file          Prostate Cancer Screening Individualized decision between patient and health care provider in men between ages of 53-78   Medicare will cover every 12 months beginning on the day after your 50th birthday PSA: 2 7 ng/mL           Hepatitis C Screening Once for adults born between 1945 and 1965  More frequently in patients at high risk for Hepatitis C Hep C Antibody: Not on file        Diabetes Screening 1-2 times per year if you're at risk for diabetes or have pre-diabetes Fasting glucose: No results in last 5 years (No results in last 5 years)  A1C: 5 5 % (12/27/2019)      Cholesterol Screening Once every 5 years if you don't have a lipid disorder  May order more often based on risk factors  Lipid panel: 01/30/2023         Other Preventive Screenings Covered by Medicare:  1  Abdominal Aortic Aneurysm (AAA) Screening: covered once if your at risk  You're considered to be at risk if you have a family history of AAA or a male between the age of 73-68 who smoking at least 100 cigarettes in your lifetime  2  Lung Cancer Screening: covers low dose CT scan once per year if you meet all of the following conditions: (1) Age 50-69; (2) No signs or symptoms of lung cancer; (3) Current smoker or have quit smoking within the last 15 years; (4) You have a tobacco smoking history of at least 20 pack years (packs per day x number of years you smoked); (5) You get a written order from a healthcare provider  3  Glaucoma Screening: covered annually if you're considered high risk: (1) You have diabetes OR (2) Family history of glaucoma OR (3)  aged 48 and older OR (3)  American aged 72 and older  3  Osteoporosis Screening: covered every 2 years if you meet one of the following conditions: (1) Have a vertebral abnormality; (2) On glucocorticoid therapy for more than 3 months; (3) Have primary hyperparathyroidism; (4) On osteoporosis medications and need to assess response to drug therapy  5  HIV Screening: covered annually if you're between the age of 12-76  Also covered annually if you are younger than 13 and older than 72 with risk factors for HIV infection  For pregnant patients, it is covered up to 3 times per pregnancy      Immunizations:  Immunization Recommendations   Influenza Vaccine Annual influenza vaccination during flu season is recommended for all persons aged >= 6 months who do not have contraindications   Pneumococcal Vaccine   * Pneumococcal conjugate vaccine = PCV13 (Prevnar 13), PCV15 (Vaxneuvance), PCV20 (Prevnar 20)  * Pneumococcal polysaccharide vaccine = PPSV23 (Pneumovax) Adults 25-60 years old: 1-3 doses may be recommended based on certain risk factors  Adults 72 years old: 1-2 doses may be recommended based off what pneumonia vaccine you previously received   Hepatitis B Vaccine 3 dose series if at intermediate or high risk (ex: diabetes, end stage renal disease, liver disease)   Tetanus (Td) Vaccine - COST NOT COVERED BY MEDICARE PART B Following completion of primary series, a booster dose should be given every 10 years to maintain immunity against tetanus  Td may also be given as tetanus wound prophylaxis  Tdap Vaccine - COST NOT COVERED BY MEDICARE PART B Recommended at least once for all adults  For pregnant patients, recommended with each pregnancy  Shingles Vaccine (Shingrix) - COST NOT COVERED BY MEDICARE PART B  2 shot series recommended in those aged 48 and above     Health Maintenance Due:      Topic Date Due   • Hepatitis C Screening  Never done   • Colorectal Cancer Screening  11/16/2021     Immunizations Due:      Topic Date Due   • Pneumococcal Vaccine: 65+ Years (2 - PCV) 09/18/2014   • COVID-19 Vaccine (4 - Booster for Schumacher Peter series) 12/06/2022     Advance Directives   What are advance directives? Advance directives are legal documents that state your wishes and plans for medical care  These plans are made ahead of time in case you lose your ability to make decisions for yourself  Advance directives can apply to any medical decision, such as the treatments you want, and if you want to donate organs  What are the types of advance directives? There are many types of advance directives, and each state has rules about how to use them  You may choose a combination of any of the following:  · Living will: This is a written record of the treatment you want  You can also choose which treatments you do not want, which to limit, and which to stop at a certain time  This includes surgery, medicine, IV fluid, and tube feedings  · Durable power of  for healthcare [facility]):   This is a written record that states who you want to make healthcare choices for you when you are unable to make them for yourself  This person, called a proxy, is usually a family member or a friend  You may choose more than 1 proxy  · Do not resuscitate (DNR) order:  A DNR order is used in case your heart stops beating or you stop breathing  It is a request not to have certain forms of treatment, such as CPR  A DNR order may be included in other types of advance directives  · Medical directive: This covers the care that you want if you are in a coma, near death, or unable to make decisions for yourself  You can list the treatments you want for each condition  Treatment may include pain medicine, surgery, blood transfusions, dialysis, IV or tube feedings, and a ventilator (breathing machine)  · Values history: This document has questions about your views, beliefs, and how you feel and think about life  This information can help others choose the care that you would choose  Why are advance directives important? An advance directive helps you control your care  Although spoken wishes may be used, it is better to have your wishes written down  Spoken wishes can be misunderstood, or not followed  Treatments may be given even if you do not want them  An advance directive may make it easier for your family to make difficult choices about your care  Weight Management   Why it is important to manage your weight:  Being overweight increases your risk of health conditions such as heart disease, high blood pressure, type 2 diabetes, and certain types of cancer  It can also increase your risk for osteoarthritis, sleep apnea, and other respiratory problems  Aim for a slow, steady weight loss  Even a small amount of weight loss can lower your risk of health problems  How to lose weight safely:  A safe and healthy way to lose weight is to eat fewer calories and get regular exercise   You can lose up about 1 pound a week by decreasing the number of calories you eat by 500 calories each day  Healthy meal plan for weight management:  A healthy meal plan includes a variety of foods, contains fewer calories, and helps you stay healthy  A healthy meal plan includes the following:  · Eat whole-grain foods more often  A healthy meal plan should contain fiber  Fiber is the part of grains, fruits, and vegetables that is not broken down by your body  Whole-grain foods are healthy and provide extra fiber in your diet  Some examples of whole-grain foods are whole-wheat breads and pastas, oatmeal, brown rice, and bulgur  · Eat a variety of vegetables every day  Include dark, leafy greens such as spinach, kale, marlon greens, and mustard greens  Eat yellow and orange vegetables such as carrots, sweet potatoes, and winter squash  · Eat a variety of fruits every day  Choose fresh or canned fruit (canned in its own juice or light syrup) instead of juice  Fruit juice has very little or no fiber  · Eat low-fat dairy foods  Drink fat-free (skim) milk or 1% milk  Eat fat-free yogurt and low-fat cottage cheese  Try low-fat cheeses such as mozzarella and other reduced-fat cheeses  · Choose meat and other protein foods that are low in fat  Choose beans or other legumes such as split peas or lentils  Choose fish, skinless poultry (chicken or turkey), or lean cuts of red meat (beef or pork)  Before you cook meat or poultry, cut off any visible fat  · Use less fat and oil  Try baking foods instead of frying them  Add less fat, such as margarine, sour cream, regular salad dressing and mayonnaise to foods  Eat fewer high-fat foods  Some examples of high-fat foods include french fries, doughnuts, ice cream, and cakes  · Eat fewer sweets  Limit foods and drinks that are high in sugar  This includes candy, cookies, regular soda, and sweetened drinks  Exercise:  Exercise at least 30 minutes per day on most days of the week   Some examples of exercise include walking, biking, dancing, and swimming  You can also fit in more physical activity by taking the stairs instead of the elevator or parking farther away from stores  Ask your healthcare provider about the best exercise plan for you  © Copyright [employer] 2018 Information is for End User's use only and may not be sold, redistributed or otherwise used for commercial purposes   All illustrations and images included in CareNotes® are the copyrighted property of A D A M , Inc  or [address] [place] [address]

## 2023-02-13 ENCOUNTER — TELEPHONE (OUTPATIENT)
Dept: ADMINISTRATIVE | Facility: OTHER | Age: 73
End: 2023-02-13

## 2023-02-13 NOTE — TELEPHONE ENCOUNTER
----- [facility] sent at 2/10/2023  7:44 AM EST -----  Regarding: Care Gap Request- colorectal screening  02/10/23 7:44 AM    Hello, our patient attached above has had CRC: Cologuard completed/performed  Please assist in updating the patient chart by pulling the document from the Media Tab   The date of service is 11/24/2021- by exact sciences- found in Media scanned in date on 02/22/2022    Thank you,  Fariba Agrawal PG [facility]

## 2023-02-13 NOTE — TELEPHONE ENCOUNTER
Upon review of the In Basket request we found the  already up to date with the requested item    Any additional questions or concerns should be emailed to the Practice Liaisons via the appropriate education email address, please do not reply via In Basket      Thank you  Vandana German MA

## 2023-11-09 ENCOUNTER — TELEPHONE (OUTPATIENT)
Age: 73
End: 2023-11-09

## 2023-11-09 NOTE — TELEPHONE ENCOUNTER
Kris Manuel from Dr. Ross Vaughn office called stating pt has a hemoglobin of 11.3. Kris aMnuel can be reached at [phone] ext. [phone].

## 2023-11-15 ENCOUNTER — OFFICE VISIT (OUTPATIENT)
Age: 73
End: 2023-11-15
Payer: COMMERCIAL

## 2023-11-15 ENCOUNTER — TELEPHONE (OUTPATIENT)
Age: 73
End: 2023-11-15

## 2023-11-15 VITALS
BODY MASS INDEX: 26.29 KG/M2 | DIASTOLIC BLOOD PRESSURE: 78 MMHG | SYSTOLIC BLOOD PRESSURE: 124 MMHG | WEIGHT: 198.4 LBS | HEIGHT: 73 IN

## 2023-11-15 DIAGNOSIS — Z80.0 FAMILY HISTORY OF COLON CANCER: ICD-10-CM

## 2023-11-15 DIAGNOSIS — D50.9 MICROCYTIC ANEMIA: Primary | ICD-10-CM

## 2023-11-15 DIAGNOSIS — Z12.11 COLON CANCER SCREENING: ICD-10-CM

## 2023-11-15 DIAGNOSIS — Z79.01 LONG TERM (CURRENT) USE OF ANTICOAGULANTS: ICD-10-CM

## 2023-11-15 PROCEDURE — 99203 OFFICE O/P NEW LOW 30 MIN: CPT | Performed by: INTERNAL MEDICINE

## 2023-11-15 NOTE — PATIENT INSTRUCTIONS
[facility] - Outpatient Consultation  Tricia Adair 74 y.o. male MRN: [id number]  Encounter: [id number]    ASSESSMENT AND PLAN:      1. Microcytic anemia  --With mild microcytic anemia. Has had about a 2 g drop in his hemoglobin in the past year. Patient did have a dental implant procedure and had blood taken out prior to that and then recent skin surgery in which he lost some blood  -Patient without any major GI symptoms. Last colonoscopy was 2015. He has had some polyps in the past and a family history    - Colonoscopy; Future  - EGD; Future  - Iron Panel (Includes Ferritin, Iron Sat%, Iron, and TIBC); Future  -Patient without apparent GI blood loss. If patient with low iron saturation will prescribe oral iron    2. Family history of colon cancer  --Mother with a history colon cancer in her 76s and on his 46s  ( son stage 3 )    3. Long term (current) use of anticoagulants  --On Xerelto for afib    -Reviewed with the patient risk of holding anticoagulation for short period pending endoscopic work-up. Risk for stroke or other embolic event probably low but not 0.    -Obtain clearance from cardiology and should not be an issue    4.  Colon cancer screening  --see above       Followup Appointment: PRN

## 2023-11-15 NOTE — TELEPHONE ENCOUNTER
Scheduled date of colonoscopy (as of today):1/9/24  Physician performing colonoscopy:BUD  Location of colonoscopy:[facility]  Bowel prep reviewed with patient:Devan  Instructions reviewed with patient by:KURT  Clearances: Bria Chavez

## 2023-11-15 NOTE — PROGRESS NOTES
[address] [place] [address] [facility] Outpatient Consultation  Nancy Ornelas 74 y.o. male MRN: [id number]  Encounter: [id number]    ASSESSMENT AND PLAN:      1. Microcytic anemia  --With mild microcytic anemia. Has had about a 2 g drop in his hemoglobin in the past year. Patient did have a dental implant procedure and had blood taken out prior to that and then recent skin surgery in which he lost some blood  -Patient without any major GI symptoms. Last colonoscopy was 2015. He has had some polyps in the past and a family history    - Colonoscopy; Future  - EGD; Future  - Iron Panel (Includes Ferritin, Iron Sat%, Iron, and TIBC); Future  -Patient without apparent GI blood loss. If patient with low iron saturation will prescribe oral iron    2. Family history of colon cancer  --Mother with a history colon cancer in her 76s and on his 46s  ( son stage 3 )    3. Long term (current) use of anticoagulants  --On Xerelto for afib  -Reviewed with the patient risk of holding anticoagulation for short period pending endoscopic work-up. Risk for stroke or other embolic event probably low but not 0.  -Obtain clearance from cardiology and should not be an issue    4. Colon cancer screening  --see above   -remote history of colon polyps -last colonoscopy in 2015 with no polyps      Followup Appointment: PRN  ______________________________________________________________________    Chief Complaint   Patient presents with    GI Consult     Pt had recent blood work that showed low hemoglobin. Dr. Dana Perkins recommended GI consult. Pt noted he is not experiencing any GI symptoms. November 2021 Pt had occult blood test and Cologuard, both negative. Patient referred for GI evaluation by his cardiologist Dr. Nick Mckeon for evaluation of anemia with borderline low red cell indices    HPI:   Nancy Ornelas is a 74y.o. year old male who presents for evaluation of anemia.   Patient is followed by his cardiologist on a yearly basis. Laboratory studies 1 year ago revealed a hemoglobin in the 13.6 g   Most recently repeat studies showed a hemoglobin of 11.3  Patient denies any symptoms of dysphagia, heartburn, or abdominal pain. He reports his move his bowels regularly. There is no blood per rectum or problems with abdominal pain. Patient reports to me that he has no GI symptoms whatsoever. Patient has had small polyps in the remote past.  He had been getting colonoscopies on a regular basis by virtue of his family history of colon cancer. His mother had colon cancer in her 76s. It should be noted patient has a son who was diagnosed with stage IV colon cancer in his early 46s and is undergoing treatment at this time. Patient's last colonoscopy was performed in 2015 and note was made of moderate diverticulosis that time and internal hemorrhoids. He had no polyps. Patient was to have a follow-up exam in 2020 but this was averted by the patient's secondary to the pandemic. He had a subsequent Cologuard study which was negative in 2021. It should be noted the patient is on Xarelto for longstanding history of atrial fibrillation. He has never had a TIA or stroke. Maria E Riggs     Historical Information   Past Medical History:   Diagnosis Date    Anemia Nov. 2023    Atrial fibrillation ([address])     Cancer [facility])     malignant melanoma of skin    Cataract of right eye     removal cataract R eye    Clotting disorder [facility]) Feb 2019    begin Xarelto    Coronary artery disease 2006    mitral valve repair    Hernia 2016    repaired twice    History of colonic polyps     Hypertension     Insomnia     Mitral valve disorder     history of mitral valve repair    PONV (postoperative nausea and vomiting)     Retinal detachment      Past Surgical History:   Procedure Laterality Date    APPENDECTOMY      CARDIAC SURGERY  2006    mitral valve repair    CATARACT EXTRACTION      COLONOSCOPY      EYE SURGERY  2009    cataract, detatched retina    FRACTURE SURGERY  1957    left elbow    HERNIA REPAIR  2020; 2021    LIPOMA RESECTION      right ankle    LYMPH NODE BIOPSY      2/7/11 Dr Donnie Meyer, Dr Divya Cao, sentinel lymph node biopsy x2 left posterior neck, lymphoscintigraphy, 12/28/10 Klaudia, Dr Heladio Carlos sclap excision-melanoma, resolved: 2/7/11      MITRAL VALVE REPAIR  2006    MECHANICAL HEART VALVE    MT EXC TUMOR SOFT TISSUE LEG/ANKLE SUBFASCIAL <5CM Right 12/15/2016    Procedure: EXCISION/RESECTION LOWER EXTREMITY MASS;  Surgeon: Rakan Alvarenga MD;  Location: [facility];  Service: Plastics    MT EXCISION MALIGNANT LESION F/E/E/N/L >4.0 CM Left 11/2/2017    Procedure: CHEEK BASAL CELL CARCINOMA EXCISION; RECONSTRUCTION;  FROZEN SECTION;  Surgeon: Rakan Alvarenga MD;  Location[facility];  Service: Plastics    MT LAPAROSCOPY SURG RPR INITIAL INGUINAL HERNIA Bilateral 1/14/2020    Procedure: Joshua Sandhoff;  Surgeon: Edelmira Anderson MD;  Location: [facility];  Service: General    RETINAL DETACHMENT SURGERY      resolved: 8/2006    SCALP EXCISION      Excision Of Lesion Scalp Malignant Over 4cm, resolved: 2/7/11    SKIN BIOPSY      SKIN GRAFT      autograft,scalp    TONSILLECTOMY       Social History     Substance and Sexual Activity   Alcohol Use No     Social History     Substance and Sexual Activity   Drug Use No     Social History     Tobacco Use   Smoking Status Never   Smokeless Tobacco Never     Family History   Problem Relation Age of Onset    Basal cell carcinoma Mother     Colon cancer Mother     Hypertension Mother     Other Mother         Benign Polyps of the Large Intestine    Dementia Mother     Arthritis Mother     Cancer Mother         colon cancer surgically removed    Colon cancer Family     Substance Abuse Neg Hx     Mental illness Neg Hx        Meds/Allergies     Current Outpatient Medications:     hydrochlorothiazide (HYDRODIURIL) 25 mg tablet    metoprolol succinate (TOPROL-XL) 50 mg 24 hr tablet    pravastatin (PRAVACHOL) 40 mg tablet    Xarelto 20 MG tablet    No Known Allergies    PHYSICAL EXAM:    Blood pressure 124/78, height 6' 0.5" (1.842 m), weight 90 kg (198 lb 6.4 oz). Body mass index is 26.54 kg/m². General Appearance: NAD, cooperative, alert  Eyes: Anicteric, conjunctiva pink  ENT:  Normocephalic, atraumatic, normal mucosa. Neck:  Supple, symmetrical, trachea midline,   Resp:  Clear to auscultation bilaterally; no rales, rhonchi or wheezing; respirations unlabored   CV:  S1 S2, , irregular rhythm no murmur, rub, or gallop. GI:  Soft, non-tender, non-distended; normal bowel sounds; no masses, no organomegaly   Rectal: Deferred  Musculoskeletal: No cyanosis, clubbing or edema. Normal ROM. Skin:  No jaundice, rashes, or lesions   Heme/Lymph: No palpable cervical lymphadenopathy  Psych: Normal affect, good eye contact  Neuro: No gross deficits, AAOx3    Lab Results:   Hemoglobin 11.2. MCV 84. Platelet count normal.--November 2023  Hemoglobin 13. 6-November 2022      REVIEW OF SYSTEMS:    CONSTITUTIONAL: Denies any fever, chills, rigors, and weight loss. HEENT: No earache or tinnitus. Denies hearing loss or visual disturbances. CARDIOVASCULAR: No chest pain or palpitations. RESPIRATORY: Denies any cough, hemoptysis, shortness of breath or dyspnea on exertion. GASTROINTESTINAL: As noted in the History of Present Illness. GENITOURINARY: No problems with urination. Denies any hematuria or dysuria. NEUROLOGIC: No dizziness or vertigo, denies headaches. MUSCULOSKELETAL: Denies any muscle or joint pain. SKIN: Denies skin rashes or itching. ENDOCRINE: Denies excessive thirst. Denies intolerance to heat or cold. PSYCHOSOCIAL: Denies depression or anxiety. Denies any recent memory loss.

## 2023-11-20 ENCOUNTER — TELEPHONE (OUTPATIENT)
Age: 73
End: 2023-11-20

## 2023-11-21 LAB
IRON SATN MFR SERPL: 10 % (ref 15–55)
IRON SERPL-MCNC: 39 UG/DL (ref 38–169)
TIBC SERPL-MCNC: 374 UG/DL (ref 250–450)
UIBC SERPL-MCNC: 335 UG/DL (ref 111–343)

## 2023-11-21 NOTE — RESULT ENCOUNTER NOTE
I called the patient and reviewed labs. His hemoglobin was 11 before recently. Iron studies show low iron saturation at 10%. Spoke with patient and advised him to go to the drugstore and get ferrous sulfate 325 daily. He will need to discontinue the medicine 1 week prior to his colonoscopy to not interfere with the prep. We will repeat CBC subsequently.   Please copy patient's cardiologist Dr. Lilia Cullen

## 2023-11-22 ENCOUNTER — TELEPHONE (OUTPATIENT)
Dept: GASTROENTEROLOGY | Facility: CLINIC | Age: 73
End: 2023-11-22

## 2023-11-22 NOTE — TELEPHONE ENCOUNTER
Provider: Dr. Maggie Escudero  Procedure: Colonoscopy/EGD  Scheduled Date: 1/9/2024  Location: [facility]  Medication(s) to stop: Xarelto  Days to hold: 2  Last dose should be: 1/6/2024  Requested from: Dr. Esteban Lennon   Date requested: 11/21/2023  Date received:    Patient alerted:

## 2023-12-26 ENCOUNTER — TELEPHONE (OUTPATIENT)
Age: 73
End: 2023-12-26

## 2023-12-26 ENCOUNTER — ANESTHESIA (OUTPATIENT)
Dept: ANESTHESIOLOGY | Facility: AMBULATORY SURGERY CENTER | Age: 73
End: 2023-12-26

## 2023-12-26 ENCOUNTER — ANESTHESIA EVENT (OUTPATIENT)
Dept: ANESTHESIOLOGY | Facility: AMBULATORY SURGERY CENTER | Age: 73
End: 2023-12-26

## 2023-12-26 NOTE — TELEPHONE ENCOUNTER
Patients GI provider:  Dr. Teixeira    Number to return call: [phone]    Reason for call: Pt called in to rescheduled colonoscopy and EGD. Patient is requesting if there are any cancellations to please give patient a call a few days prior.    Scheduled procedure/appointment date if applicable: Apt/procedure 04/11/2024

## 2023-12-27 ENCOUNTER — TELEPHONE (OUTPATIENT)
Dept: GASTROENTEROLOGY | Facility: CLINIC | Age: 73
End: 2023-12-27

## 2023-12-27 NOTE — TELEPHONE ENCOUNTER
Spoke to patient. He has golytely prep instructions prescription needs to be sent to [facility] pharmacy.

## 2023-12-27 NOTE — TELEPHONE ENCOUNTER
Fariba Suero, RN  [facility]; [facility]  Please see the message below from anesthesia. They are requesting that patient be rescheduled to hospital location. If you have any questions or concerns, please reach out to anesthesia directly.    Thank you.          Previous Messages       ----- Message -----  From: Matthias Klein DO  Sent: 12/26/2023   7:43 AM EST  To: Asc Reschedule Pool    Pt has chronic a fib and last note from Cardiology 11/23 indicated he had worsening SOB.  There weren't any further notes or studies after that. Would prefer hospital I believe.

## 2024-01-19 DIAGNOSIS — Z00.6 ENCOUNTER FOR EXAMINATION FOR NORMAL COMPARISON OR CONTROL IN CLINICAL RESEARCH PROGRAM: ICD-10-CM

## 2024-02-06 ENCOUNTER — APPOINTMENT (OUTPATIENT)
Dept: LAB | Facility: HOSPITAL | Age: 74
End: 2024-02-06

## 2024-02-06 DIAGNOSIS — Z00.6 ENCOUNTER FOR EXAMINATION FOR NORMAL COMPARISON OR CONTROL IN CLINICAL RESEARCH PROGRAM: ICD-10-CM

## 2024-02-06 PROCEDURE — 36415 COLL VENOUS BLD VENIPUNCTURE: CPT

## 2024-02-21 ENCOUNTER — TELEPHONE (OUTPATIENT)
Dept: FAMILY MEDICINE CLINIC | Facility: CLINIC | Age: 74
End: 2024-02-21

## 2024-02-21 DIAGNOSIS — D50.9 MICROCYTIC ANEMIA: ICD-10-CM

## 2024-02-21 DIAGNOSIS — E78.2 MIXED HYPERLIPIDEMIA: Primary | ICD-10-CM

## 2024-02-21 DIAGNOSIS — Z12.5 SCREENING PSA (PROSTATE SPECIFIC ANTIGEN): ICD-10-CM

## 2024-02-21 NOTE — TELEPHONE ENCOUNTER
Pt would like yearly blood work placed including iron panel. Specifically Ferritan, percent iron saturation, iron and TIBC.     Lab-arun.     Wants to  printed copy, wants to be notified when done.

## 2024-03-03 LAB
APOB+LDLR+PCSK9 GENE MUT ANL BLD/T: NOT DETECTED
BRCA1+BRCA2 DEL+DUP + FULL MUT ANL BLD/T: NOT DETECTED
MLH1+MSH2+MSH6+PMS2 GN DEL+DUP+FUL M: NOT DETECTED

## 2024-03-06 LAB
ALBUMIN SERPL-MCNC: 4 G/DL (ref 3.8–4.8)
ALBUMIN/GLOB SERPL: 1.7 {RATIO} (ref 1.2–2.2)
ALP SERPL-CCNC: 79 IU/L (ref 44–121)
ALT SERPL-CCNC: 16 IU/L (ref 0–44)
AST SERPL-CCNC: 21 IU/L (ref 0–40)
BASOPHILS # BLD AUTO: 0 X10E3/UL (ref 0–0.2)
BASOPHILS NFR BLD AUTO: 0 %
BILIRUB SERPL-MCNC: 0.8 MG/DL (ref 0–1.2)
BUN SERPL-MCNC: 25 MG/DL (ref 8–27)
BUN/CREAT SERPL: 23 (ref 10–24)
CALCIUM SERPL-MCNC: 9.2 MG/DL (ref 8.6–10.2)
CHLORIDE SERPL-SCNC: 105 MMOL/L (ref 96–106)
CHOLEST SERPL-MCNC: 121 MG/DL (ref 100–199)
CO2 SERPL-SCNC: 26 MMOL/L (ref 20–29)
CREAT SERPL-MCNC: 1.11 MG/DL (ref 0.76–1.27)
EGFR: 69 ML/MIN/1.73
EOSINOPHIL # BLD AUTO: 0.1 X10E3/UL (ref 0–0.4)
EOSINOPHIL NFR BLD AUTO: 2 %
ERYTHROCYTE [DISTWIDTH] IN BLOOD BY AUTOMATED COUNT: 13.6 % (ref 11.6–15.4)
FERRITIN SERPL-MCNC: 13 NG/ML (ref 30–400)
GLOBULIN SER-MCNC: 2.3 G/DL (ref 1.5–4.5)
GLUCOSE SERPL-MCNC: 89 MG/DL (ref 70–99)
HCT VFR BLD AUTO: 40.3 % (ref 37.5–51)
HDLC SERPL-MCNC: 38 MG/DL
HGB BLD-MCNC: 13.8 G/DL (ref 13–17.7)
IMM GRANULOCYTES # BLD: 0 X10E3/UL (ref 0–0.1)
IMM GRANULOCYTES NFR BLD: 0 %
IRON SATN MFR SERPL: 21 % (ref 15–55)
IRON SERPL-MCNC: 77 UG/DL (ref 38–169)
LDLC SERPL CALC-MCNC: 61 MG/DL (ref 0–99)
LDLC/HDLC SERPL: 1.6 RATIO (ref 0–3.6)
LYMPHOCYTES # BLD AUTO: 1.6 X10E3/UL (ref 0.7–3.1)
LYMPHOCYTES NFR BLD AUTO: 26 %
MCH RBC QN AUTO: 29.9 PG (ref 26.6–33)
MCHC RBC AUTO-ENTMCNC: 34.2 G/DL (ref 31.5–35.7)
MCV RBC AUTO: 87 FL (ref 79–97)
MONOCYTES # BLD AUTO: 0.4 X10E3/UL (ref 0.1–0.9)
MONOCYTES NFR BLD AUTO: 7 %
NEUTROPHILS # BLD AUTO: 4 X10E3/UL (ref 1.4–7)
NEUTROPHILS NFR BLD AUTO: 65 %
PLATELET # BLD AUTO: 325 X10E3/UL (ref 150–450)
POTASSIUM SERPL-SCNC: 4.7 MMOL/L (ref 3.5–5.2)
PROT SERPL-MCNC: 6.3 G/DL (ref 6–8.5)
PSA SERPL-MCNC: 2.1 NG/ML (ref 0–4)
RBC # BLD AUTO: 4.61 X10E6/UL (ref 4.14–5.8)
SL AMB REFLEX CRITERIA: NORMAL
SL AMB VLDL CHOLESTEROL CALC: 22 MG/DL (ref 5–40)
SODIUM SERPL-SCNC: 141 MMOL/L (ref 134–144)
TIBC SERPL-MCNC: 359 UG/DL (ref 250–450)
TRIGL SERPL-MCNC: 122 MG/DL (ref 0–149)
UIBC SERPL-MCNC: 282 UG/DL (ref 111–343)
WBC # BLD AUTO: 6.2 X10E3/UL (ref 3.4–10.8)

## 2024-03-13 ENCOUNTER — OFFICE VISIT (OUTPATIENT)
Dept: FAMILY MEDICINE CLINIC | Facility: CLINIC | Age: 74
End: 2024-03-13
Payer: COMMERCIAL

## 2024-03-13 VITALS
SYSTOLIC BLOOD PRESSURE: 126 MMHG | DIASTOLIC BLOOD PRESSURE: 78 MMHG | WEIGHT: 199 LBS | OXYGEN SATURATION: 99 % | BODY MASS INDEX: 26.37 KG/M2 | HEIGHT: 73 IN

## 2024-03-13 DIAGNOSIS — F51.01 PRIMARY INSOMNIA: ICD-10-CM

## 2024-03-13 DIAGNOSIS — C43.9 MALIGNANT MELANOMA OF SKIN (HCC): ICD-10-CM

## 2024-03-13 DIAGNOSIS — Z00.00 MEDICARE ANNUAL WELLNESS VISIT, SUBSEQUENT: Primary | ICD-10-CM

## 2024-03-13 DIAGNOSIS — Z79.01 LONG TERM (CURRENT) USE OF ANTICOAGULANTS: ICD-10-CM

## 2024-03-13 DIAGNOSIS — I48.0 PAROXYSMAL ATRIAL FIBRILLATION (HCC): ICD-10-CM

## 2024-03-13 DIAGNOSIS — I05.9 MITRAL VALVE DISORDER: ICD-10-CM

## 2024-03-13 DIAGNOSIS — E78.2 MIXED HYPERLIPIDEMIA: ICD-10-CM

## 2024-03-13 PROCEDURE — 99214 OFFICE O/P EST MOD 30 MIN: CPT | Performed by: FAMILY MEDICINE

## 2024-03-13 PROCEDURE — G0439 PPPS, SUBSEQ VISIT: HCPCS | Performed by: FAMILY MEDICINE

## 2024-03-13 RX ORDER — ZOLPIDEM TARTRATE 5 MG/1
5 TABLET ORAL
Qty: 30 TABLET | Refills: 5 | Status: SHIPPED | OUTPATIENT
Start: 2024-03-13

## 2024-03-13 NOTE — PROGRESS NOTES
Assessment and Plan:     Problem List Items Addressed This Visit       Malignant melanoma of skin (HCC)    Mitral valve disorder    Long term (current) use of anticoagulants    Mixed hyperlipidemia     Other Visit Diagnoses       Medicare annual wellness visit, subsequent    -  Primary            Depression Screening and Follow-up Plan: Patient was screened for depression during today's encounter. They screened negative with a PHQ-2 score of 0.      Preventive health issues were discussed with patient, and age appropriate screening tests were ordered as noted in patient's After Visit Summary.  Personalized health advice and appropriate referrals for health education or preventive services given if needed, as noted in patient's After Visit Summary.     History of Present Illness:     Patient presents for a Medicare Wellness Visit    HPI   Patient Care Team:  Bon Summers MD as PCP - General  MD Epifanio Scherer MD     Review of Systems:     Review of Systems   Constitutional:  Negative for fatigue, fever and unexpected weight change.   HENT:  Negative for congestion, sinus pain and sore throat.    Eyes:  Negative for visual disturbance.   Respiratory:  Negative for shortness of breath and wheezing.    Cardiovascular:  Negative for chest pain and palpitations.   Gastrointestinal:  Negative for abdominal pain, nausea and vomiting.   Musculoskeletal: Negative.  Negative for arthralgias and myalgias.   Neurological:  Negative for syncope, weakness and numbness.   Psychiatric/Behavioral: Negative.  Negative for confusion, dysphoric mood and suicidal ideas.         Problem List:     Patient Active Problem List   Diagnosis    Atrial fibrillation (HCC)    Malignant melanoma of skin (HCC)    Familial hypercholesterolemia    Insomnia    Mitral valve disorder    Hypertension    Closed nondisplaced fracture of medial malleolus of left tibia    Closed traumatic nondisplaced fracture of posterior malleolus  of left tibia    Closed traumatic nondisplaced fracture of proximal end of left fibula    Pre-op examination    Long term (current) use of anticoagulants    Mixed hyperlipidemia      Past Medical and Surgical History:     Past Medical History:   Diagnosis Date    Anemia Nov. 2023    Atrial fibrillation (HCC)     Cancer (HCC)     malignant melanoma of skin    Cataract of right eye     removal cataract R eye    Clotting disorder (HCC) Feb 2019    begin Xarelto    Coronary artery disease 2006    mitral valve repair    Hernia 2016    repaired twice    History of colonic polyps     Hypertension     Insomnia     Mitral valve disorder     history of mitral valve repair    PONV (postoperative nausea and vomiting)     Retinal detachment      Past Surgical History:   Procedure Laterality Date    APPENDECTOMY      CARDIAC SURGERY  2006    mitral valve repair    CATARACT EXTRACTION      COLONOSCOPY      EYE SURGERY  2009    cataract, detatched retina    FRACTURE SURGERY  1957    left elbow    HERNIA REPAIR  2020; 2021    LIPOMA RESECTION      right ankle    LYMPH NODE BIOPSY      2/7/11 Dr Cedeno, Dr Garland-ANA sclcha melanoma, sentinel lymph node biopsy x2 left posterior neck, lymphoscintigraphy, 12/28/10 Klaudia, Dr Bon wallace excision-melanoma, resolved: 2/7/11      MITRAL VALVE REPAIR  2006    MECHANICAL HEART VALVE    MO EXC TUMOR SOFT TISSUE LEG/ANKLE SUBFASCIAL <5CM Right 12/15/2016    Procedure: EXCISION/RESECTION LOWER EXTREMITY MASS;  Surgeon: Epifanio Garland MD;  [facility];  Service: Plastics    MO EXCISION MALIGNANT LESION F/E/E/N/L >4.0 CM Left 11/2/2017    Procedure: CHEEK BASAL CELL CARCINOMA EXCISION; RECONSTRUCTION;  FROZEN SECTION;  Surgeon: Epifanio Garland MD;  [facility];  Service: Plastics    MO LAPAROSCOPY SURG RPR INITIAL INGUINAL HERNIA Bilateral 1/14/2020    Procedure: REPAIR HERNIA INGUINAL, LAPAROSCOPIC;  Surgeon: Mo Turcios MD;  Location:  MAIN OR;   Service: General    RETINAL DETACHMENT SURGERY      resolved: 8/2006    SCALP EXCISION      Excision Of Lesion Scalp Malignant Over 4cm, resolved: 2/7/11    SKIN BIOPSY      SKIN GRAFT      autograft,scalp    TONSILLECTOMY        Family History:     Family History   Problem Relation Age of Onset    Basal cell carcinoma Mother     Colon cancer Mother     Hypertension Mother     Other Mother         Benign Polyps of the Large Intestine    Dementia Mother     Arthritis Mother     Cancer Mother         colon cancer surgically removed    Colon cancer Family     Substance Abuse Neg Hx     Mental illness Neg Hx       Social History:     Social History     Socioeconomic History    Marital status: /Civil Union     Spouse name: Rosanna    Number of children: 2    Years of education: None    Highest education level: None   Occupational History    Occupation: Retired   Tobacco Use    Smoking status: Never    Smokeless tobacco: Never   Vaping Use    Vaping status: Never Used   Substance and Sexual Activity    Alcohol use: No    Drug use: No    Sexual activity: Yes     Partners: Female     Birth control/protection: None   Other Topics Concern    None   Social History Narrative    None     Social Determinants of Health     Financial Resource Strain: Low Risk  (3/7/2024)    Overall Financial Resource Strain (CARDIA)     Difficulty of Paying Living Expenses: Not hard at all   Food Insecurity: No Food Insecurity (3/13/2024)    Hunger Vital Sign     Worried About Running Out of Food in the Last Year: Never true     Ran Out of Food in the Last Year: Never true   Transportation Needs: No Transportation Needs (3/13/2024)    PRAPARE - Transportation     Lack of Transportation (Medical): No     Lack of Transportation (Non-Medical): No   Physical Activity: Not on file   Stress: Not on file   Social Connections: Not on file   Intimate Partner Violence: Not on file   Housing Stability: Unknown (3/13/2024)    Housing Stability Vital Sign      Unable to Pay for Housing in the Last Year: No     Number of Places Lived in the Last Year: Not on file     Unstable Housing in the Last Year: No      Medications and Allergies:     Current Outpatient Medications   Medication Sig Dispense Refill    hydrochlorothiazide (HYDRODIURIL) 25 mg tablet Take 12.5 mg by mouth daily      metoprolol succinate (TOPROL-XL) 50 mg 24 hr tablet Take 50 mg by mouth daily  3    pravastatin (PRAVACHOL) 40 mg tablet Take 40 mg by mouth daily      Xarelto 20 MG tablet 1 tablet daily       No current facility-administered medications for this visit.     No Known Allergies   Immunizations:     Immunization History   Administered Date(s) Administered    COVID-19 PFIZER VACCINE 0.3 ML IM 03/07/2021, 03/28/2021, 11/03/2021    COVID-19 Pfizer Vac BIVALENT Julio-sucrose 12 Yr+ IM 10/11/2022    COVID-19 Pfizer mRNA vacc PF julio-sucrose 12 yr and older (Comirnaty) 10/20/2023    INFLUENZA 10/03/2017, 09/04/2018, 10/06/2020, 10/06/2021, 09/14/2022    Influenza Split High Dose Preservative Free IM 09/30/2015, 09/16/2016    Influenza, seasonal, injectable 09/16/2014    Pneumococcal Conjugate Vaccine 20-valent (Pcv20), Polysace 02/10/2023    Pneumococcal Polysaccharide PPV23 09/18/2013    Rabies-IM Human Diploid Cell Culture 08/22/2020, 08/25/2020, 08/29/2020, 09/05/2020    Tdap 08/22/2020    Zoster 10/22/2014    Zoster Vaccine Recombinant 09/30/2019, 02/07/2020    influenza, trivalent, adjuvanted 09/30/2019      Health Maintenance:         Topic Date Due    Hepatitis C Screening  Never done    Colorectal Cancer Screening  11/16/2021         Topic Date Due    COVID-19 Vaccine (6 - 2023-24 season) 12/15/2023      Medicare Screening Tests and Risk Assessments:     Last Medicare Wellness visit information reviewed, patient interviewed and updates made to the record today.      Health Risk Assessment:   Patient rates overall health as good. Patient feels that their physical health rating is slightly  better. Patient is satisfied with their life. Eyesight was rated as same. Hearing was rated as same. Patient feels that their emotional and mental health rating is slightly better. Patients states they are sometimes angry. Patient states they are sometimes unusually tired/fatigued. Pain experienced in the last 7 days has been none. Patient states that he has experienced no weight loss or gain in last 6 months.     Depression Screening:   PHQ-2 Score: 0  PHQ-9 Score: 0      Fall Risk Screening:   In the past year, patient has experienced: no history of falling in past year      Home Safety:  Patient does not have trouble with stairs inside or outside of their home. Patient has working smoke alarms and has no working carbon monoxide detector. Home safety hazards include: none.     Nutrition:   Current diet is Regular and Limited junk food.     Medications:   Patient is currently taking over-the-counter supplements. OTC medications include: see medication list. Patient is able to manage medications.     Activities of Daily Living (ADLs)/Instrumental Activities of Daily Living (IADLs):   Walk and transfer into and out of bed and chair?: Yes  Dress and groom yourself?: Yes    Bathe or shower yourself?: Yes    Feed yourself? Yes  Do your laundry/housekeeping?: Yes  Manage your money, pay your bills and track your expenses?: Yes  Make your own meals?: Yes    Do your own shopping?: Yes    Durable Medical Equipment Suppliers  none    Previous Hospitalizations:   Any hospitalizations or ED visits within the last 12 months?: No      Advance Care Planning:   Living will: Yes    Durable POA for healthcare: Yes    Advanced directive: Yes    Provider agrees with end of life decisions: No      Cognitive Screening:   Provider or family/friend/caregiver concerned regarding cognition?: No    PREVENTIVE SCREENINGS      Cardiovascular Screening:    General: Screening Not Indicated and History Lipid Disorder      Diabetes Screening:      "General: Screening Current      Prostate Cancer Screening:    General: Screening Not Indicated      Osteoporosis Screening:    General: Screening Not Indicated      Abdominal Aortic Aneurysm (AAA) Screening:    Risk factors include: age between 65-76 yo        Lung Cancer Screening:     General: Screening Not Indicated    Screening, Brief Intervention, and Referral to Treatment (SBIRT)    Screening  Typical number of drinks in a day: 0  Typical number of drinks in a week: 0  Interpretation: Low risk drinking behavior.    AUDIT-C Screenin) How often did you have a drink containing alcohol in the past year? never  2) How many drinks did you have on a typical day when you were drinking in the past year? 0  3) How often did you have 6 or more drinks on one occasion in the past year? never    AUDIT-C Score: 0  Interpretation: Score 0-3 (male): Negative screen for alcohol misuse    Single Item Drug Screening:  How often have you used an illegal drug (including marijuana) or a prescription medication for non-medical reasons in the past year? never    Single Item Drug Screen Score: 0  Interpretation: Negative screen for possible drug use disorder    No results found.     Physical Exam:     /78 (BP Location: Left arm, Patient Position: Sitting, Cuff Size: Standard)   Ht 6' 0.5\" (1.842 m)   Wt 90.3 kg (199 lb)   SpO2 99%   BMI 26.62 kg/m²     Physical Exam  Vitals and nursing note reviewed.   Constitutional:       General: He is not in acute distress.     Appearance: He is well-developed.   HENT:      Head: Normocephalic and atraumatic.   Eyes:      Conjunctiva/sclera: Conjunctivae normal.   Cardiovascular:      Rate and Rhythm: Normal rate and regular rhythm.      Heart sounds: No murmur heard.  Pulmonary:      Effort: Pulmonary effort is normal. No respiratory distress.      Breath sounds: Normal breath sounds.   Abdominal:      Palpations: Abdomen is soft.      Tenderness: There is no abdominal tenderness. "   Musculoskeletal:         General: No swelling.      Cervical back: Neck supple.   Skin:     General: Skin is warm and dry.      Capillary Refill: Capillary refill takes less than 2 seconds.   Neurological:      Mental Status: He is alert.   Psychiatric:         Mood and Affect: Mood normal.          Bon Summers MD

## 2024-03-13 NOTE — PATIENT INSTRUCTIONS
Medicare Preventive Visit Patient Instructions  Thank you for completing your Welcome to Medicare Visit or Medicare Annual Wellness Visit today. Your next wellness visit will be due in one year (3/14/2025).  The screening/preventive services that you may require over the next 5-10 years are detailed below. Some tests may not apply to you based off risk factors and/or age. Screening tests ordered at today's visit but not completed yet may show as past due. Also, please note that scanned in results may not display below.  Preventive Screenings:  Service Recommendations Previous Testing/Comments   Colorectal Cancer Screening  Colonoscopy    Fecal Occult Blood Test (FOBT)/Fecal Immunochemical Test (FIT)  Fecal DNA/Cologuard Test  Flexible Sigmoidoscopy Age: 45-75 years old   Colonoscopy: every 10 years (May be performed more frequently if at higher risk)  OR  FOBT/FIT: every 1 year  OR  Cologuard: every 3 years  OR  Sigmoidoscopy: every 5 years  Screening may be recommended earlier than age 45 if at higher risk for colorectal cancer. Also, an individualized decision between you and your healthcare provider will decide whether screening between the ages of 76-85 would be appropriate. Colonoscopy: 08/19/2015  FOBT/FIT: 11/15/2021  Cologuard: 11/15/2021  Sigmoidoscopy: Not on file          Prostate Cancer Screening Individualized decision between patient and health care provider in men between ages of 55-69   Medicare will cover every 12 months beginning on the day after your 50th birthday PSA: 2.1 ng/mL           Hepatitis C Screening Once for adults born between 1945 and 1965  More frequently in patients at high risk for Hepatitis C Hep C Antibody: Not on file        Diabetes Screening 1-2 times per year if you're at risk for diabetes or have pre-diabetes Fasting glucose: No results in last 5 years (No results in last 5 years)  A1C: 5.5 % (12/27/2019)      Cholesterol Screening Once every 5 years if you don't have a  lipid disorder. May order more often based on risk factors. Lipid panel: 03/05/2024         Other Preventive Screenings Covered by Medicare:  Abdominal Aortic Aneurysm (AAA) Screening: covered once if your at risk. You're considered to be at risk if you have a family history of AAA or a male between the age of 65-75 who smoking at least 100 cigarettes in your lifetime.  Lung Cancer Screening: covers low dose CT scan once per year if you meet all of the following conditions: (1) Age 55-77; (2) No signs or symptoms of lung cancer; (3) Current smoker or have quit smoking within the last 15 years; (4) You have a tobacco smoking history of at least 20 pack years (packs per day x number of years you smoked); (5) You get a written order from a healthcare provider.  Glaucoma Screening: covered annually if you're considered high risk: (1) You have diabetes OR (2) Family history of glaucoma OR (3)  aged 50 and older OR (4)  American aged 65 and older  Osteoporosis Screening: covered every 2 years if you meet one of the following conditions: (1) Have a vertebral abnormality; (2) On glucocorticoid therapy for more than 3 months; (3) Have primary hyperparathyroidism; (4) On osteoporosis medications and need to assess response to drug therapy.  HIV Screening: covered annually if you're between the age of 15-65. Also covered annually if you are younger than 15 and older than 65 with risk factors for HIV infection. For pregnant patients, it is covered up to 3 times per pregnancy.    Immunizations:  Immunization Recommendations   Influenza Vaccine Annual influenza vaccination during flu season is recommended for all persons aged >= 6 months who do not have contraindications   Pneumococcal Vaccine   * Pneumococcal conjugate vaccine = PCV13 (Prevnar 13), PCV15 (Vaxneuvance), PCV20 (Prevnar 20)  * Pneumococcal polysaccharide vaccine = PPSV23 (Pneumovax) Adults 19-63 yo with certain risk factors or if 65+ yo  If  never received any pneumonia vaccine: recommend Prevnar 20 (PCV20)  Give PCV20 if previously received 1 dose of PCV13 or PPSV23   Hepatitis B Vaccine 3 dose series if at intermediate or high risk (ex: diabetes, end stage renal disease, liver disease)   Respiratory syncytial virus (RSV) Vaccine - COVERED BY MEDICARE PART D  * RSVPreF3 (Arexvy) CDC recommends that adults 60 years of age and older may receive a single dose of RSV vaccine using shared clinical decision-making (SCDM)   Tetanus (Td) Vaccine - COST NOT COVERED BY MEDICARE PART B Following completion of primary series, a booster dose should be given every 10 years to maintain immunity against tetanus. Td may also be given as tetanus wound prophylaxis.   Tdap Vaccine - COST NOT COVERED BY MEDICARE PART B Recommended at least once for all adults. For pregnant patients, recommended with each pregnancy.   Shingles Vaccine (Shingrix) - COST NOT COVERED BY MEDICARE PART B  2 shot series recommended in those 19 years and older who have or will have weakened immune systems or those 50 years and older     Health Maintenance Due:      Topic Date Due   • Hepatitis C Screening  Never done   • Colorectal Cancer Screening  11/16/2021     Immunizations Due:      Topic Date Due   • COVID-19 Vaccine (6 - 2023-24 season) 12/15/2023     Advance Directives   What are advance directives?  Advance directives are legal documents that state your wishes and plans for medical care. These plans are made ahead of time in case you lose your ability to make decisions for yourself. Advance directives can apply to any medical decision, such as the treatments you want, and if you want to donate organs.   What are the types of advance directives?  There are many types of advance directives, and each state has rules about how to use them. You may choose a combination of any of the following:  Living will:  This is a written record of the treatment you want. You can also choose which  treatments you do not want, which to limit, and which to stop at a certain time. This includes surgery, medicine, IV fluid, and tube feedings.   Durable power of  for healthcare (DPAHC):  This is a written record that states who you want to make healthcare choices for you when you are unable to make them for yourself. This person, called a proxy, is usually a family member or a friend. You may choose more than 1 proxy.  Do not resuscitate (DNR) order:  A DNR order is used in case your heart stops beating or you stop breathing. It is a request not to have certain forms of treatment, such as CPR. A DNR order may be included in other types of advance directives.  Medical directive:  This covers the care that you want if you are in a coma, near death, or unable to make decisions for yourself. You can list the treatments you want for each condition. Treatment may include pain medicine, surgery, blood transfusions, dialysis, IV or tube feedings, and a ventilator (breathing machine).  Values history:  This document has questions about your views, beliefs, and how you feel and think about life. This information can help others choose the care that you would choose.  Why are advance directives important?  An advance directive helps you control your care. Although spoken wishes may be used, it is better to have your wishes written down. Spoken wishes can be misunderstood, or not followed. Treatments may be given even if you do not want them. An advance directive may make it easier for your family to make difficult choices about your care.   Weight Management   Why it is important to manage your weight:  Being overweight increases your risk of health conditions such as heart disease, high blood pressure, type 2 diabetes, and certain types of cancer. It can also increase your risk for osteoarthritis, sleep apnea, and other respiratory problems. Aim for a slow, steady weight loss. Even a small amount of weight loss can  lower your risk of health problems.  How to lose weight safely:  A safe and healthy way to lose weight is to eat fewer calories and get regular exercise. You can lose up about 1 pound a week by decreasing the number of calories you eat by 500 calories each day.   Healthy meal plan for weight management:  A healthy meal plan includes a variety of foods, contains fewer calories, and helps you stay healthy. A healthy meal plan includes the following:  Eat whole-grain foods more often.  A healthy meal plan should contain fiber. Fiber is the part of grains, fruits, and vegetables that is not broken down by your body. Whole-grain foods are healthy and provide extra fiber in your diet. Some examples of whole-grain foods are whole-wheat breads and pastas, oatmeal, brown rice, and bulgur.  Eat a variety of vegetables every day.  Include dark, leafy greens such as spinach, kale, marlon greens, and mustard greens. Eat yellow and orange vegetables such as carrots, sweet potatoes, and winter squash.   Eat a variety of fruits every day.  Choose fresh or canned fruit (canned in its own juice or light syrup) instead of juice. Fruit juice has very little or no fiber.  Eat low-fat dairy foods.  Drink fat-free (skim) milk or 1% milk. Eat fat-free yogurt and low-fat cottage cheese. Try low-fat cheeses such as mozzarella and other reduced-fat cheeses.  Choose meat and other protein foods that are low in fat.  Choose beans or other legumes such as split peas or lentils. Choose fish, skinless poultry (chicken or turkey), or lean cuts of red meat (beef or pork). Before you cook meat or poultry, cut off any visible fat.   Use less fat and oil.  Try baking foods instead of frying them. Add less fat, such as margarine, sour cream, regular salad dressing and mayonnaise to foods. Eat fewer high-fat foods. Some examples of high-fat foods include french fries, doughnuts, ice cream, and cakes.  Eat fewer sweets.  Limit foods and drinks that are  high in sugar. This includes candy, cookies, regular soda, and sweetened drinks.  Exercise:  Exercise at least 30 minutes per day on most days of the week. Some examples of exercise include walking, biking, dancing, and swimming. You can also fit in more physical activity by taking the stairs instead of the elevator or parking farther away from stores. Ask your healthcare provider about the best exercise plan for you.      © Copyright [employer] 2018 Information is for End User's use only and may not be sold, redistributed or otherwise used for commercial purposes. All illustrations and images included in CareNotes® are the copyrighted property of [employer].A[employer]M., Inc. or Intellectual Investments

## 2024-03-28 DIAGNOSIS — Z12.11 SCREENING FOR COLON CANCER: Primary | ICD-10-CM

## 2024-04-01 ENCOUNTER — PATIENT MESSAGE (OUTPATIENT)
Dept: GASTROENTEROLOGY | Facility: CLINIC | Age: 74
End: 2024-04-01

## 2024-04-04 ENCOUNTER — TELEPHONE (OUTPATIENT)
Dept: GASTROENTEROLOGY | Facility: CLINIC | Age: 74
End: 2024-04-04

## 2024-04-04 NOTE — TELEPHONE ENCOUNTER
Nirmala from Dr. Perez's office called back and advised they will be faxing over the paperwork now.

## 2024-04-11 ENCOUNTER — ANESTHESIA EVENT (OUTPATIENT)
Dept: GASTROENTEROLOGY | Facility: HOSPITAL | Age: 74
End: 2024-04-11

## 2024-04-11 ENCOUNTER — ANESTHESIA (OUTPATIENT)
Dept: GASTROENTEROLOGY | Facility: HOSPITAL | Age: 74
End: 2024-04-11

## 2024-04-11 ENCOUNTER — HOSPITAL ENCOUNTER (OUTPATIENT)
Dept: GASTROENTEROLOGY | Facility: HOSPITAL | Age: 74
Setting detail: OUTPATIENT SURGERY
End: 2024-04-11
Attending: INTERNAL MEDICINE
Payer: COMMERCIAL

## 2024-04-11 VITALS
OXYGEN SATURATION: 94 % | TEMPERATURE: 98 F | SYSTOLIC BLOOD PRESSURE: 105 MMHG | HEART RATE: 72 BPM | RESPIRATION RATE: 18 BRPM | DIASTOLIC BLOOD PRESSURE: 68 MMHG

## 2024-04-11 DIAGNOSIS — D50.9 MICROCYTIC ANEMIA: ICD-10-CM

## 2024-04-11 PROCEDURE — 43239 EGD BIOPSY SINGLE/MULTIPLE: CPT | Performed by: INTERNAL MEDICINE

## 2024-04-11 PROCEDURE — 45380 COLONOSCOPY AND BIOPSY: CPT | Performed by: INTERNAL MEDICINE

## 2024-04-11 PROCEDURE — 88342 IMHCHEM/IMCYTCHM 1ST ANTB: CPT | Performed by: STUDENT IN AN ORGANIZED HEALTH CARE EDUCATION/TRAINING PROGRAM

## 2024-04-11 PROCEDURE — 88305 TISSUE EXAM BY PATHOLOGIST: CPT | Performed by: STUDENT IN AN ORGANIZED HEALTH CARE EDUCATION/TRAINING PROGRAM

## 2024-04-11 RX ORDER — PROPOFOL 10 MG/ML
INJECTION, EMULSION INTRAVENOUS AS NEEDED
Status: DISCONTINUED | OUTPATIENT
Start: 2024-04-11 | End: 2024-04-11

## 2024-04-11 RX ORDER — LIDOCAINE HYDROCHLORIDE 20 MG/ML
INJECTION, SOLUTION EPIDURAL; INFILTRATION; INTRACAUDAL; PERINEURAL AS NEEDED
Status: DISCONTINUED | OUTPATIENT
Start: 2024-04-11 | End: 2024-04-11

## 2024-04-11 RX ORDER — SODIUM CHLORIDE, SODIUM LACTATE, POTASSIUM CHLORIDE, CALCIUM CHLORIDE 600; 310; 30; 20 MG/100ML; MG/100ML; MG/100ML; MG/100ML
INJECTION, SOLUTION INTRAVENOUS CONTINUOUS PRN
Status: DISCONTINUED | OUTPATIENT
Start: 2024-04-11 | End: 2024-04-11

## 2024-04-11 RX ADMIN — PROPOFOL 50 MG: 10 INJECTION, EMULSION INTRAVENOUS at 10:38

## 2024-04-11 RX ADMIN — PROPOFOL 100 MG: 10 INJECTION, EMULSION INTRAVENOUS at 10:22

## 2024-04-11 RX ADMIN — PROPOFOL 50 MG: 10 INJECTION, EMULSION INTRAVENOUS at 10:42

## 2024-04-11 RX ADMIN — PROPOFOL 50 MG: 10 INJECTION, EMULSION INTRAVENOUS at 10:47

## 2024-04-11 RX ADMIN — LIDOCAINE HYDROCHLORIDE 100 MG: 20 INJECTION, SOLUTION EPIDURAL; INFILTRATION; INTRACAUDAL; PERINEURAL at 10:22

## 2024-04-11 RX ADMIN — PROPOFOL 50 MG: 10 INJECTION, EMULSION INTRAVENOUS at 10:31

## 2024-04-11 RX ADMIN — PROPOFOL 50 MG: 10 INJECTION, EMULSION INTRAVENOUS at 10:25

## 2024-04-11 RX ADMIN — SODIUM CHLORIDE, SODIUM LACTATE, POTASSIUM CHLORIDE, AND CALCIUM CHLORIDE: .6; .31; .03; .02 INJECTION, SOLUTION INTRAVENOUS at 10:10

## 2024-04-11 NOTE — ANESTHESIA PREPROCEDURE EVALUATION
Procedure:  COLONOSCOPY  EGD    Relevant Problems   CARDIO   (+) Atrial fibrillation (HCC)   (+) Familial hypercholesterolemia   (+) Hypertension   (+) Mixed hyperlipidemia        Physical Exam    Airway    Mallampati score: III  TM Distance: <3 FB  Neck ROM: full     Dental   Comment: None loose     Cardiovascular      Pulmonary      Other Findings        Anesthesia Plan  ASA Score- 3     Anesthesia Type- IV sedation with anesthesia with ASA Monitors.         Additional Monitors:     Airway Plan:     Comment: 05:00 - last of PO bowel prep    Patient educated on the possibility for awareness under sedation and of the possibility of airway intervention in the event of an airway or procedural emergency    Xarelto appropriately held.       Plan Factors-Exercise tolerance (METS): >4 METS.    Chart reviewed.    Patient summary reviewed.    Patient is not a current smoker.              Induction- intravenous.    Postoperative Plan-     Informed Consent- Anesthetic plan and risks discussed with patient.  I personally reviewed this patient with the CRNA. Discussed and agreed on the Anesthesia Plan with the CRNA..

## 2024-04-11 NOTE — ANESTHESIA POSTPROCEDURE EVALUATION
Post-Op Assessment Note    CV Status:  Stable  Pain Score: 0    Pain management: adequate       Mental Status:  Alert and awake   Hydration Status:  Euvolemic   PONV Controlled:  Controlled   Airway Patency:  Patent     Post Op Vitals Reviewed: Yes    No anethesia notable event occurred.    Staff: Anesthesiologist, CRNA               /63 (04/11/24 1054)    Temp      Pulse 68 (04/11/24 1054)   Resp 18 (04/11/24 1054)    SpO2 94 % (04/11/24 1054)

## 2024-04-11 NOTE — H&P
History and Physical - [facility]  Jake Perez 73 y.o. male MRN: [id number]    HPI: Jake Perez is a 73 y.o. year old male who presents for EGD and colonoscopy.  He has a recent diagnosis of iron deficiency anemia    REVIEW OF SYSTEMS: Per the HPI, and otherwise unremarkable.    Historical Information   Past Medical History:   Diagnosis Date    Anemia Nov. 2023    Atrial fibrillation (HCC)     Cancer (HCC)     malignant melanoma of skin    Cataract of right eye     removal cataract R eye    Clotting disorder (HCC) Feb 2019    begin Xarelto    Coronary artery disease 2006    mitral valve repair    Hernia 2016    repaired twice    History of colonic polyps     Hypertension     Insomnia     Mitral valve disorder     history of mitral valve repair    PONV (postoperative nausea and vomiting)     Retinal detachment      Past Surgical History:   Procedure Laterality Date    APPENDECTOMY      CARDIAC SURGERY  2006    mitral valve repair    CATARACT EXTRACTION      COLONOSCOPY      EYE SURGERY  2009    cataract, detatched retina    FRACTURE SURGERY  1957    left elbow    HERNIA REPAIR  2020; 2021    LIPOMA RESECTION      right ankle    LYMPH NODE BIOPSY      2/7/11 Dr Cedeno, Dr Garland-ANA sclap melanoma, sentinel lymph node biopsy x2 left posterior neck, lymphoscintigraphy, 12/28/10 [facility], Dr Bon Summers sclap excision-melanoma, resolved: 2/7/11      MITRAL VALVE REPAIR  2006    MECHANICAL HEART VALVE    IN EXC TUMOR SOFT TISSUE LEG/ANKLE SUBFASCIAL <5CM Right 12/15/2016    Procedure: EXCISION/RESECTION LOWER EXTREMITY MASS;  Surgeon: Epifanio Garland MD;  Location:  MAIN OR;  Service: Plastics    IN EXCISION MALIGNANT LESION F/E/E/N/L >4.0 CM Left 11/2/2017    Procedure: CHEEK BASAL CELL CARCINOMA EXCISION; RECONSTRUCTION;  FROZEN SECTION;  Surgeon: Epifanio Garland MD;  Location:  MAIN OR;  Service: Plastics    IN LAPAROSCOPY SURG RPR INITIAL INGUINAL HERNIA Bilateral 1/14/2020     Procedure: REPAIR HERNIA INGUINAL, LAPAROSCOPIC;  Surgeon: Mo Turcios MD;  Location: [facility] MAIN OR;  Service: General    RETINAL DETACHMENT SURGERY      resolved: 8/2006    SCALP EXCISION      Excision Of Lesion Scalp Malignant Over 4cm, resolved: 2/7/11    SKIN BIOPSY      SKIN GRAFT      autograft,scalp    TONSILLECTOMY       Social History   Social History     Substance and Sexual Activity   Alcohol Use No     Social History     Substance and Sexual Activity   Drug Use No     Social History     Tobacco Use   Smoking Status Never   Smokeless Tobacco Never     Family History   Problem Relation Age of Onset    Basal cell carcinoma Mother     Colon cancer Mother     Hypertension Mother     Other Mother         Benign Polyps of the Large Intestine    Dementia Mother     Arthritis Mother     Cancer Mother         colon cancer surgically removed    Colon cancer Family     Substance Abuse Neg Hx     Mental illness Neg Hx        Meds/Allergies       Current Outpatient Medications:     hydrochlorothiazide (HYDRODIURIL) 25 mg tablet    metoprolol succinate (TOPROL-XL) 50 mg 24 hr tablet    pravastatin (PRAVACHOL) 40 mg tablet    polyethylene glycol (GOLYTELY) 4000 mL solution    Xarelto 20 MG tablet    zolpidem (AMBIEN) 5 mg tablet    No Known Allergies    Objective     There were no vitals taken for this visit.    PHYSICAL EXAM    Gen: NAD AAOx3  Head: Normocephalic, Atraumatic  CV: S1S2 RRR no m/r/g  CHEST: Clear b/l no c/r/w  ABD: soft, +BS NT/ND  EXT: no edema    ASSESSMENT/PLAN:  This is a 73 y.o. year old male here for diagnostic EGD and colonoscopy, and he is stable and optimized for his procedure.

## 2024-04-16 PROCEDURE — 88342 IMHCHEM/IMCYTCHM 1ST ANTB: CPT | Performed by: STUDENT IN AN ORGANIZED HEALTH CARE EDUCATION/TRAINING PROGRAM

## 2024-04-16 PROCEDURE — 88305 TISSUE EXAM BY PATHOLOGIST: CPT | Performed by: STUDENT IN AN ORGANIZED HEALTH CARE EDUCATION/TRAINING PROGRAM

## 2024-04-19 NOTE — RESULT ENCOUNTER NOTE
Patient did review results of biopsies on MyChart.  Upper endoscopy some chronic active gastritis no bacteria in the stomach-H. pylori.  No celiac disease raised area on colon biopsies just lymphoid tissue.  No adenoma.  No recall EGD or colonoscopy.  If patient agrees we will arrange capsule endoscopy.  Patient did see results on MyChart.

## 2024-04-22 ENCOUNTER — TELEPHONE (OUTPATIENT)
Dept: GASTROENTEROLOGY | Facility: CLINIC | Age: 74
End: 2024-04-22

## 2024-04-22 DIAGNOSIS — E61.1 IRON DEFICIENCY: Primary | ICD-10-CM

## 2024-04-22 NOTE — TELEPHONE ENCOUNTER
Called the patient and reviewed results of his studies and lab work.  Has low ferritin so we do not have a clear explanation for why this is the case.  Rationale for capsule endoscopy is to make sure that is not a small bowel lesion or leaky vessel.  Patient wants to hold off on this.  Prefers just to follow labs.  Will check CBC in 3 to 4 months and iron panel and ferritin.  Can follow-up in the office 6 months

## 2024-04-23 ENCOUNTER — TELEPHONE (OUTPATIENT)
Dept: GASTROENTEROLOGY | Facility: CLINIC | Age: 74
End: 2024-04-23

## 2024-04-23 NOTE — RESULT ENCOUNTER NOTE
Called the patient and reviewed path.  No celiac disease and mild gastritis.  No H. pylori.  No colon polyps or adenoma.  Suggested possible capsule for further evaluation and following CBC.  Patient wants to hold off at least for now.  CBC 3 to 4 months on iron panel and follow-up in the office 6 months

## 2024-04-23 NOTE — TELEPHONE ENCOUNTER
----- Message from Dione Monreal sent at 4/23/2024 11:07 AM EDT -----  Enter lab recall  ----- Message -----  From: Kendall Teixeira MD  Sent: 4/23/2024   8:36 AM EDT  To: Bon Summers MD; Dione Monreal    Called the patient and reviewed path.  No celiac disease and mild gastritis.  No H. pylori.  No colon polyps or adenoma.  Suggested possible capsule for further evaluation and following CBC.  Patient wants to hold off at least for now.  CBC 3 to 4 months on iron panel and follow-up in the office 6 months

## 2024-07-24 LAB
BASOPHILS # BLD AUTO: 0 X10E3/UL (ref 0–0.2)
BASOPHILS NFR BLD AUTO: 0 %
EOSINOPHIL # BLD AUTO: 0.1 X10E3/UL (ref 0–0.4)
EOSINOPHIL NFR BLD AUTO: 3 %
ERYTHROCYTE [DISTWIDTH] IN BLOOD BY AUTOMATED COUNT: 13.7 % (ref 11.6–15.4)
FERRITIN SERPL-MCNC: 13 NG/ML (ref 30–400)
HCT VFR BLD AUTO: 39.6 % (ref 37.5–51)
HGB BLD-MCNC: 13.2 G/DL (ref 13–17.7)
IMM GRANULOCYTES # BLD: 0 X10E3/UL (ref 0–0.1)
IMM GRANULOCYTES NFR BLD: 0 %
IRON SATN MFR SERPL: 17 % (ref 15–55)
IRON SERPL-MCNC: 61 UG/DL (ref 38–169)
LYMPHOCYTES # BLD AUTO: 1.8 X10E3/UL (ref 0.7–3.1)
LYMPHOCYTES NFR BLD AUTO: 32 %
MCH RBC QN AUTO: 28.9 PG (ref 26.6–33)
MCHC RBC AUTO-ENTMCNC: 33.3 G/DL (ref 31.5–35.7)
MCV RBC AUTO: 87 FL (ref 79–97)
MONOCYTES # BLD AUTO: 0.5 X10E3/UL (ref 0.1–0.9)
MONOCYTES NFR BLD AUTO: 8 %
NEUTROPHILS # BLD AUTO: 3.1 X10E3/UL (ref 1.4–7)
NEUTROPHILS NFR BLD AUTO: 57 %
PLATELET # BLD AUTO: 314 X10E3/UL (ref 150–450)
RBC # BLD AUTO: 4.57 X10E6/UL (ref 4.14–5.8)
TIBC SERPL-MCNC: 358 UG/DL (ref 250–450)
UIBC SERPL-MCNC: 297 UG/DL (ref 111–343)
WBC # BLD AUTO: 5.5 X10E3/UL (ref 3.4–10.8)

## 2024-08-02 ENCOUNTER — TELEPHONE (OUTPATIENT)
Dept: GASTROENTEROLOGY | Facility: CLINIC | Age: 74
End: 2024-08-02

## 2024-08-02 DIAGNOSIS — E61.1 IRON DEFICIENCY: Primary | ICD-10-CM

## 2024-08-02 NOTE — TELEPHONE ENCOUNTER
I called the patient and reviewed labs.  Hemoglobin low normal 13.2 ferritin is low and iron sat low normal range-13 and 17% respectively.  Patient not interested in having capsule endoscopy at this time.  No major symptoms.  Discussed possibility of occult small bowel bleed.  Patient with negative recent EGD and colonoscopy.  Recommend ferrous gluconate 240 mg daily.  Does not tolerate ferrous sulfate.  Hemoglobin not low enough to get IV iron.  Recheck CBC and iron panel in late October early November prior to patient's visit.

## 2024-08-29 ENCOUNTER — TELEPHONE (OUTPATIENT)
Dept: GASTROENTEROLOGY | Facility: CLINIC | Age: 74
End: 2024-08-29

## 2024-08-29 DIAGNOSIS — E61.1 IRON DEFICIENCY: Primary | ICD-10-CM

## 2024-08-29 LAB
FERRITIN SERPL-MCNC: 16 NG/ML (ref 30–400)
IRON SATN MFR SERPL: 13 % (ref 15–55)
IRON SERPL-MCNC: 46 UG/DL (ref 38–169)
TIBC SERPL-MCNC: 345 UG/DL (ref 250–450)
UIBC SERPL-MCNC: 299 UG/DL (ref 111–343)

## 2024-08-29 NOTE — RESULT ENCOUNTER NOTE
Labs reviewed and [facility] and message sent.  Iron stores on the low side.  Repeat labs in 2 months-patient had been advised to try ferrous gluconate.  Intolerance to ferrous sulfate.

## 2024-11-01 ENCOUNTER — IMMUNIZATIONS (OUTPATIENT)
Dept: FAMILY MEDICINE CLINIC | Facility: CLINIC | Age: 74
End: 2024-11-01
Payer: COMMERCIAL

## 2024-11-01 DIAGNOSIS — Z23 ENCOUNTER FOR IMMUNIZATION: Primary | ICD-10-CM

## 2024-11-01 PROCEDURE — 90662 IIV NO PRSV INCREASED AG IM: CPT

## 2024-11-01 PROCEDURE — G0008 ADMIN INFLUENZA VIRUS VAC: HCPCS

## 2024-11-13 ENCOUNTER — TELEPHONE (OUTPATIENT)
Age: 74
End: 2024-11-13

## 2024-11-13 NOTE — TELEPHONE ENCOUNTER
Patients GI provider:  Dr. Teixeira    Number to return call: [phone]    Reason for call: Pt calling stating he went to [facility] for his Iron blood work but has not gotten any results. No results in chart. He has an appt. tomorrow morning and wants to make sure Dr. Teixeira has the results.      Scheduled procedure/appointment date if applicable: 11/14/24

## 2024-11-14 ENCOUNTER — OFFICE VISIT (OUTPATIENT)
Dept: GASTROENTEROLOGY | Facility: CLINIC | Age: 74
End: 2024-11-14
Payer: COMMERCIAL

## 2024-11-14 VITALS
WEIGHT: 200.6 LBS | DIASTOLIC BLOOD PRESSURE: 66 MMHG | SYSTOLIC BLOOD PRESSURE: 124 MMHG | HEIGHT: 73 IN | BODY MASS INDEX: 26.59 KG/M2

## 2024-11-14 DIAGNOSIS — K29.50 CHRONIC GASTRITIS WITHOUT BLEEDING, UNSPECIFIED GASTRITIS TYPE: ICD-10-CM

## 2024-11-14 DIAGNOSIS — D50.9 MICROCYTIC ANEMIA: Primary | ICD-10-CM

## 2024-11-14 DIAGNOSIS — Z79.01 LONG TERM (CURRENT) USE OF ANTICOAGULANTS: ICD-10-CM

## 2024-11-14 PROCEDURE — 99213 OFFICE O/P EST LOW 20 MIN: CPT | Performed by: INTERNAL MEDICINE

## 2024-11-14 PROCEDURE — G2211 COMPLEX E/M VISIT ADD ON: HCPCS | Performed by: INTERNAL MEDICINE

## 2024-11-14 NOTE — PROGRESS NOTES
[facility] - Outpatient Follow-up Note  Jake Perez 74 y.o. male MRN: [id number]  Encounter: [id number]    ASSESSMENT AND PLAN:      1. Microcytic anemia (Primary)  --Patient with low normal hemoglobin but low iron saturation and ferritin as of August of this year.  Repeat studies performed      - CBC and differential; Future  - Iron Panel (Includes Ferritin, Iron Sat%, Iron, and TIBC); Future  -Patient with upper and lower endoscopy in April 2024.  2 cm hiatal hernia and mild gastritis.  No source for GI blood loss.  Colonoscopy likewise negative  -Patient did have lab results at [facility] last week but unable to access at this time.  Will call with results when available    -Repeat studies in 3 months.  Could consider capsule endoscopy in the future-need to do this by April of this year to ensure coverage    2. Long term (current) use of anticoagulants  -Patient on Xarelto for history of atrial fibs    3. Chronic gastritis without bleeding, unspecified gastritis type  -Patient with chronic gastritis on endoscopic biopsy.  H. pylori negative      Followup Appointment: 6 mo   ______________________________________________________________________    Chief Complaint   Patient presents with    Low Ferritin     HPI: Patient returns to the office for follow-up with respect to his iron deficiency.  Patient presented about 1 year ago with a decrease in his hemoglobin by 2 g.  Baseline hemoglobin 13-14 that had dropped down to 11.  He had iron deficiency parameters.  Patient underwent upper and lower endoscopy in April of this year.  Note was made of the 2 cm hiatal hernia and some gastritis.  No major colonic lesions were noted.  Patient did have some diverticulosis.  He has no GI symptoms.  There is no overt blood loss.  He has been taking supplemental iron.  Patient's hemoglobin was normal and July but in August his iron saturation and ferritin were in the low range.  He has been  taking supplemental iron at that time.  He went for lab work last week.  He feels completely well.    Historical Information   Past Medical History:   Diagnosis Date    Anemia Nov. 2023    Atrial fibrillation (HCC)     Cancer (HCC)     malignant melanoma of skin    Cataract of right eye     removal cataract R eye    Clotting disorder (HCC) Feb 2019    begin Xarelto    Coronary artery disease 2006    mitral valve repair    Hernia 2016    repaired twice    History of colonic polyps     Hypertension     Insomnia     Mitral valve disorder     history of mitral valve repair    PONV (postoperative nausea and vomiting)     Retinal detachment      Past Surgical History:   Procedure Laterality Date    APPENDECTOMY      CARDIAC SURGERY  2006    mitral valve repair    CATARACT EXTRACTION      COLONOSCOPY      EYE SURGERY  2009    cataract, detatched retina    FRACTURE SURGERY  1957    left elbow    HERNIA REPAIR  2020; 2021    LIPOMA RESECTION      right ankle    LYMPH NODE BIOPSY      2/7/11 Dr Cedeno, Dr Garland-ANA sclap melanoma, sentinel lymph node biopsy x2 left posterior neck, lymphoscintigraphy, 12/28/10 Klaudia, Dr Bon Summers sclap excision-melanoma, resolved: 2/7/11      MITRAL VALVE REPAIR  2006    MECHANICAL HEART VALVE    NY EXC TUMOR SOFT TISSUE LEG/ANKLE SUBFASCIAL <5CM Right 12/15/2016    Procedure: EXCISION/RESECTION LOWER EXTREMITY MASS;  Surgeon: Epifanio Garland MD;  Location: [facility];  Service: Plastics    NY EXCISION MALIGNANT LESION F/E/E/N/L >4.0 CM Left 11/2/2017    Procedure: CHEEK BASAL CELL CARCINOMA EXCISION; RECONSTRUCTION;  FROZEN SECTION;  Surgeon: Epifanio Garland MD;  Location: [facility];  Service: Plastics    NY LAPAROSCOPY SURG RPR INITIAL INGUINAL HERNIA Bilateral 1/14/2020    Procedure: REPAIR HERNIA INGUINAL, LAPAROSCOPIC;  Surgeon: Mo Turcios MD;  Location:  MAIN OR;  Service: General    RETINAL DETACHMENT SURGERY      resolved: 8/2006    SCALP EXCISION      Excision  "Of Lesion Scalp Malignant Over 4cm, resolved: 2/7/11    SKIN BIOPSY      SKIN GRAFT      autograft,scalp    TONSILLECTOMY       Social History     Substance and Sexual Activity   Alcohol Use No     Social History     Substance and Sexual Activity   Drug Use No     Social History     Tobacco Use   Smoking Status Never   Smokeless Tobacco Never     Family History   Problem Relation Age of Onset    Basal cell carcinoma Mother     Colon cancer Mother     Hypertension Mother     Other Mother         Benign Polyps of the Large Intestine    Dementia Mother     Arthritis Mother     Cancer Mother         colon cancer surgically removed    Colon cancer Family     Substance Abuse Neg Hx     Mental illness Neg Hx          Current Outpatient Medications:     hydrochlorothiazide (HYDRODIURIL) 25 mg tablet    metoprolol succinate (TOPROL-XL) 50 mg 24 hr tablet    pravastatin (PRAVACHOL) 40 mg tablet    zolpidem (AMBIEN) 5 mg tablet    Xarelto 20 MG tablet  No Known Allergies  Reviewed medications and allergies and updated as indicated    PHYSICAL EXAM:    Blood pressure 124/66, height 6' 1\" (1.854 m), weight 91 kg (200 lb 9.6 oz). Body mass index is 26.47 kg/m².  General Appearance: NAD, cooperative, alert  Eyes: Anicteric, conjunctiva pink  ENT:  Normocephalic, atraumatic, normal mucosa.    Neck:  Supple, symmetrical, trachea midline  Resp:  Clear to auscultation bilaterally; no rales, rhonchi or wheezing; respirations unlabored   CV:  S1 S2, Regular rate and rhythm; no murmur, rub, or gallop.  GI:  Soft, non-tender, non-distended; normal bowel sounds; no masses, no organomegaly   Rectal: Deferred  Musculoskeletal: No cyanosis, clubbing or edema. Normal ROM.  Skin:  No jaundice, rashes, or lesions   Heme/Lymph: No palpable cervical lymphadenopathy  Psych: Normal affect, good eye contact  Neuro: No gross deficits, AAOx3    Lab Results:   Lab Results   Component Value Date    WBC 5.5 07/23/2024    HGB 13.2 07/23/2024    HCT 39.6 " 07/23/2024    MCV 87 07/23/2024     07/23/2024       .

## 2024-11-14 NOTE — PATIENT INSTRUCTIONS
[facility] - Outpatient Follow-up Note  Jake Perez 74 y.o. male MRN: [id number]  Encounter: [id number]    ASSESSMENT AND PLAN:      1. Microcytic anemia (Primary)  --Patient with low normal hemoglobin but low iron saturation and ferritin as of August of this year.  Repeat studies performed      - CBC and differential; Future  - Iron Panel (Includes Ferritin, Iron Sat%, Iron, and TIBC); Future  -Patient with upper and lower endoscopy in April 2024.  2 cm hiatal hernia and mild gastritis.  No source for GI blood loss.  Colonoscopy likewise negative  -Patient did have lab results at [facility] last week but unable to access at this time.  Will call with results when available    -Repeat studies in 3 months.  Could consider capsule endoscopy in the future-would need to do this by April of this year to ensure coverage    2. Long term (current) use of anticoagulants  -Patient on Xarelto for history of atrial fibs    3. Chronic gastritis without bleeding, unspecified gastritis type  -Patient with chronic gastritis on endoscopic biopsy.  H. pylori negative      Followup Appointment: 6 mo

## 2024-12-23 ENCOUNTER — TELEPHONE (OUTPATIENT)
Dept: GASTROENTEROLOGY | Facility: CLINIC | Age: 74
End: 2024-12-23

## 2024-12-23 NOTE — TELEPHONE ENCOUNTER
Called the patient in response to his call.  He had seen his cardiologist Dr. Perez who has been following his labs.  Patient's ferritin and iron panel are low normal.  He did have a CBC but I cannot find it in the chart.  Will asked staff to get CBC from [facility] from recent labs.  If not the patient can send me a copy.  Reasonable to proceed with capsule endoscopy as I had recommended previously.  Will schedule for the early new year.  Patient would qualify as he had an EGD and colonoscopy in April 2024 so we should not have trouble getting Medicare approval

## 2025-02-03 ENCOUNTER — TELEPHONE (OUTPATIENT)
Age: 75
End: 2025-02-03

## 2025-02-18 ENCOUNTER — PROCEDURE VISIT (OUTPATIENT)
Dept: GASTROENTEROLOGY | Facility: CLINIC | Age: 75
End: 2025-02-18
Attending: INTERNAL MEDICINE

## 2025-02-18 DIAGNOSIS — D50.9 IRON DEFICIENCY ANEMIA, UNSPECIFIED IRON DEFICIENCY ANEMIA TYPE: ICD-10-CM

## 2025-02-23 ENCOUNTER — PATIENT MESSAGE (OUTPATIENT)
Dept: FAMILY MEDICINE CLINIC | Facility: CLINIC | Age: 75
End: 2025-02-23

## 2025-02-23 DIAGNOSIS — E78.2 MIXED HYPERLIPIDEMIA: Primary | ICD-10-CM

## 2025-02-28 ENCOUNTER — DOCUMENTATION (OUTPATIENT)
Dept: GASTROENTEROLOGY | Facility: CLINIC | Age: 75
End: 2025-02-28

## 2025-02-28 NOTE — PROGRESS NOTES
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 Teixeira as scheduled   Discussed with patient

## 2025-03-03 LAB
FERRITIN SERPL-MCNC: 42 NG/ML (ref 30–400)
IRON SATN MFR SERPL: 58 % (ref 15–55)
IRON SERPL-MCNC: 180 UG/DL (ref 38–169)
TIBC SERPL-MCNC: 313 UG/DL (ref 250–450)
UIBC SERPL-MCNC: 133 UG/DL (ref 111–343)

## 2025-03-04 ENCOUNTER — RESULTS FOLLOW-UP (OUTPATIENT)
Dept: GASTROENTEROLOGY | Facility: CLINIC | Age: 75
End: 2025-03-04

## 2025-03-04 DIAGNOSIS — D50.0 IRON DEFICIENCY ANEMIA DUE TO CHRONIC BLOOD LOSS: Primary | ICD-10-CM

## 2025-03-04 LAB
ALBUMIN SERPL-MCNC: 4.1 G/DL (ref 3.8–4.8)
ALP SERPL-CCNC: 85 IU/L (ref 44–121)
ALT SERPL-CCNC: 20 IU/L (ref 0–44)
AST SERPL-CCNC: 23 IU/L (ref 0–40)
BILIRUB SERPL-MCNC: 0.8 MG/DL (ref 0–1.2)
BUN SERPL-MCNC: 22 MG/DL (ref 8–27)
BUN/CREAT SERPL: 21 (ref 10–24)
CALCIUM SERPL-MCNC: 9.3 MG/DL (ref 8.6–10.2)
CHLORIDE SERPL-SCNC: 104 MMOL/L (ref 96–106)
CHOLEST SERPL-MCNC: 125 MG/DL (ref 100–199)
CO2 SERPL-SCNC: 23 MMOL/L (ref 20–29)
CREAT SERPL-MCNC: 1.05 MG/DL (ref 0.76–1.27)
EGFR: 74 ML/MIN/1.73
GLOBULIN SER-MCNC: 2.3 G/DL (ref 1.5–4.5)
GLUCOSE SERPL-MCNC: 86 MG/DL (ref 70–99)
HDLC SERPL-MCNC: 38 MG/DL
LDLC SERPL CALC-MCNC: 64 MG/DL (ref 0–99)
LDLC/HDLC SERPL: 1.7 RATIO (ref 0–3.6)
POTASSIUM SERPL-SCNC: 4.4 MMOL/L (ref 3.5–5.2)
PROT SERPL-MCNC: 6.4 G/DL (ref 6–8.5)
SL AMB VLDL CHOLESTEROL CALC: 23 MG/DL (ref 5–40)
SODIUM SERPL-SCNC: 142 MMOL/L (ref 134–144)
TRIGL SERPL-MCNC: 131 MG/DL (ref 0–149)

## 2025-03-04 NOTE — RESULT ENCOUNTER NOTE
Patient reviewed labs.  Patient's iron saturation level is normal.  May be reasonable to repeat CBC in about 1 to 2 months.  Patient is on Xarelto.  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  Most recent hemoglobin 15.  No endoscopic intervention at this time

## 2025-03-13 ENCOUNTER — OFFICE VISIT (OUTPATIENT)
Dept: FAMILY MEDICINE CLINIC | Facility: CLINIC | Age: 75
End: 2025-03-13
Payer: COMMERCIAL

## 2025-03-13 VITALS
BODY MASS INDEX: 26.96 KG/M2 | SYSTOLIC BLOOD PRESSURE: 128 MMHG | WEIGHT: 203.4 LBS | OXYGEN SATURATION: 96 % | HEIGHT: 73 IN | TEMPERATURE: 97.7 F | HEART RATE: 94 BPM | DIASTOLIC BLOOD PRESSURE: 80 MMHG

## 2025-03-13 DIAGNOSIS — I10 PRIMARY HYPERTENSION: ICD-10-CM

## 2025-03-13 DIAGNOSIS — C43.9 MALIGNANT MELANOMA OF SKIN (HCC): ICD-10-CM

## 2025-03-13 DIAGNOSIS — F51.01 PRIMARY INSOMNIA: ICD-10-CM

## 2025-03-13 DIAGNOSIS — I48.19 PERSISTENT ATRIAL FIBRILLATION (HCC): ICD-10-CM

## 2025-03-13 DIAGNOSIS — Z00.00 MEDICARE ANNUAL WELLNESS VISIT, SUBSEQUENT: Primary | ICD-10-CM

## 2025-03-13 PROCEDURE — G0439 PPPS, SUBSEQ VISIT: HCPCS | Performed by: FAMILY MEDICINE

## 2025-03-13 PROCEDURE — 99214 OFFICE O/P EST MOD 30 MIN: CPT | Performed by: FAMILY MEDICINE

## 2025-03-13 RX ORDER — ZOLPIDEM TARTRATE 5 MG/1
5 TABLET ORAL
Qty: 30 TABLET | Refills: 5 | Status: SHIPPED | OUTPATIENT
Start: 2025-03-13 | End: 2025-03-13 | Stop reason: SDUPTHER

## 2025-03-13 RX ORDER — ZOLPIDEM TARTRATE 5 MG/1
5 TABLET ORAL
Qty: 30 TABLET | Refills: 5 | Status: SHIPPED | OUTPATIENT
Start: 2025-03-13

## 2025-03-13 NOTE — PATIENT INSTRUCTIONS
Medicare Preventive Visit Patient Instructions  Thank you for completing your Welcome to Medicare Visit or Medicare Annual Wellness Visit today. Your next wellness visit will be due in one year (3/14/2026).  The screening/preventive services that you may require over the next 5-10 years are detailed below. Some tests may not apply to you based off risk factors and/or age. Screening tests ordered at today's visit but not completed yet may show as past due. Also, please note that scanned in results may not display below.  Preventive Screenings:  Service Recommendations Previous Testing/Comments   Colorectal Cancer Screening  Colonoscopy    Fecal Occult Blood Test (FOBT)/Fecal Immunochemical Test (FIT)  Fecal DNA/Cologuard Test  Flexible Sigmoidoscopy Age: 45-75 years old   Colonoscopy: every 10 years (May be performed more frequently if at higher risk)  OR  FOBT/FIT: every 1 year  OR  Cologuard: every 3 years  OR  Sigmoidoscopy: every 5 years  Screening may be recommended earlier than age 45 if at higher risk for colorectal cancer. Also, an individualized decision between you and your healthcare provider will decide whether screening between the ages of 76-85 would be appropriate. Colonoscopy: 04/11/2024  FOBT/FIT: Not on file  Cologuard: Not on file  Sigmoidoscopy: Not on file          Prostate Cancer Screening Individualized decision between patient and health care provider in men between ages of 55-69   Medicare will cover every 12 months beginning on the day after your 50th birthday PSA: 2.1 ng/mL           Hepatitis C Screening Once for adults born between 1945 and 1965  More frequently in patients at high risk for Hepatitis C Hep C Antibody: Not on file        Diabetes Screening 1-2 times per year if you're at risk for diabetes or have pre-diabetes Fasting glucose: No results in last 5 years (No results in last 5 years)  A1C: No results in last 5 years (No results in last 5 years)      Cholesterol Screening Once  every 5 years if you don't have a lipid disorder. May order more often based on risk factors. Lipid panel: 03/03/2025         Other Preventive Screenings Covered by Medicare:  Abdominal Aortic Aneurysm (AAA) Screening: covered once if your at risk. You're considered to be at risk if you have a family history of AAA or a male between the age of 65-75 who smoking at least 100 cigarettes in your lifetime.  Lung Cancer Screening: covers low dose CT scan once per year if you meet all of the following conditions: (1) Age 55-77; (2) No signs or symptoms of lung cancer; (3) Current smoker or have quit smoking within the last 15 years; (4) You have a tobacco smoking history of at least 20 pack years (packs per day x number of years you smoked); (5) You get a written order from a healthcare provider.  Glaucoma Screening: covered annually if you're considered high risk: (1) You have diabetes OR (2) Family history of glaucoma OR (3)  aged 50 and older OR (4)  American aged 65 and older  Osteoporosis Screening: covered every 2 years if you meet one of the following conditions: (1) Have a vertebral abnormality; (2) On glucocorticoid therapy for more than 3 months; (3) Have primary hyperparathyroidism; (4) On osteoporosis medications and need to assess response to drug therapy.  HIV Screening: covered annually if you're between the age of 15-65. Also covered annually if you are younger than 15 and older than 65 with risk factors for HIV infection. For pregnant patients, it is covered up to 3 times per pregnancy.    Immunizations:  Immunization Recommendations   Influenza Vaccine Annual influenza vaccination during flu season is recommended for all persons aged >= 6 months who do not have contraindications   Pneumococcal Vaccine   * Pneumococcal conjugate vaccine = PCV13 (Prevnar 13), PCV15 (Vaxneuvance), PCV20 (Prevnar 20)  * Pneumococcal polysaccharide vaccine = PPSV23 (Pneumovax) Adults 19-65 yo with  certain risk factors or if 65+ yo  If never received any pneumonia vaccine: recommend Prevnar 20 (PCV20)  Give PCV20 if previously received 1 dose of PCV13 or PPSV23   Hepatitis B Vaccine 3 dose series if at intermediate or high risk (ex: diabetes, end stage renal disease, liver disease)   Respiratory syncytial virus (RSV) Vaccine - COVERED BY MEDICARE PART D  * RSVPreF3 (Arexvy) CDC recommends that adults 60 years of age and older may receive a single dose of RSV vaccine using shared clinical decision-making (SCDM)   Tetanus (Td) Vaccine - COST NOT COVERED BY MEDICARE PART B Following completion of primary series, a booster dose should be given every 10 years to maintain immunity against tetanus. Td may also be given as tetanus wound prophylaxis.   Tdap Vaccine - COST NOT COVERED BY MEDICARE PART B Recommended at least once for all adults. For pregnant patients, recommended with each pregnancy.   Shingles Vaccine (Shingrix) - COST NOT COVERED BY MEDICARE PART B  2 shot series recommended in those 19 years and older who have or will have weakened immune systems or those 50 years and older     Health Maintenance Due:      Topic Date Due   • Hepatitis C Screening  Never done   • Colorectal Cancer Screening  Discontinued     Immunizations Due:      Topic Date Due   • COVID-19 Vaccine (6 - 2024-25 season) 09/01/2024     Advance Directives   What are advance directives?  Advance directives are legal documents that state your wishes and plans for medical care. These plans are made ahead of time in case you lose your ability to make decisions for yourself. Advance directives can apply to any medical decision, such as the treatments you want, and if you want to donate organs.   What are the types of advance directives?  There are many types of advance directives, and each state has rules about how to use them. You may choose a combination of any of the following:  Living will:  This is a written record of the treatment you  want. You can also choose which treatments you do not want, which to limit, and which to stop at a certain time. This includes surgery, medicine, IV fluid, and tube feedings.   Durable power of  for healthcare (DPAHC):  This is a written record that states who you want to make healthcare choices for you when you are unable to make them for yourself. This person, called a proxy, is usually a family member or a friend. You may choose more than 1 proxy.  Do not resuscitate (DNR) order:  A DNR order is used in case your heart stops beating or you stop breathing. It is a request not to have certain forms of treatment, such as CPR. A DNR order may be included in other types of advance directives.  Medical directive:  This covers the care that you want if you are in a coma, near death, or unable to make decisions for yourself. You can list the treatments you want for each condition. Treatment may include pain medicine, surgery, blood transfusions, dialysis, IV or tube feedings, and a ventilator (breathing machine).  Values history:  This document has questions about your views, beliefs, and how you feel and think about life. This information can help others choose the care that you would choose.  Why are advance directives important?  An advance directive helps you control your care. Although spoken wishes may be used, it is better to have your wishes written down. Spoken wishes can be misunderstood, or not followed. Treatments may be given even if you do not want them. An advance directive may make it easier for your family to make difficult choices about your care.   Weight Management   Why it is important to manage your weight:  Being overweight increases your risk of health conditions such as heart disease, high blood pressure, type 2 diabetes, and certain types of cancer. It can also increase your risk for osteoarthritis, sleep apnea, and other respiratory problems. Aim for a slow, steady weight loss. Even a  small amount of weight loss can lower your risk of health problems.  How to lose weight safely:  A safe and healthy way to lose weight is to eat fewer calories and get regular exercise. You can lose up about 1 pound a week by decreasing the number of calories you eat by 500 calories each day.   Healthy meal plan for weight management:  A healthy meal plan includes a variety of foods, contains fewer calories, and helps you stay healthy. A healthy meal plan includes the following:  Eat whole-grain foods more often.  A healthy meal plan should contain fiber. Fiber is the part of grains, fruits, and vegetables that is not broken down by your body. Whole-grain foods are healthy and provide extra fiber in your diet. Some examples of whole-grain foods are whole-wheat breads and pastas, oatmeal, brown rice, and bulgur.  Eat a variety of vegetables every day.  Include dark, leafy greens such as spinach, kale, marlon greens, and mustard greens. Eat yellow and orange vegetables such as carrots, sweet potatoes, and winter squash.   Eat a variety of fruits every day.  Choose fresh or canned fruit (canned in its own juice or light syrup) instead of juice. Fruit juice has very little or no fiber.  Eat low-fat dairy foods.  Drink fat-free (skim) milk or 1% milk. Eat fat-free yogurt and low-fat cottage cheese. Try low-fat cheeses such as mozzarella and other reduced-fat cheeses.  Choose meat and other protein foods that are low in fat.  Choose beans or other legumes such as split peas or lentils. Choose fish, skinless poultry (chicken or turkey), or lean cuts of red meat (beef or pork). Before you cook meat or poultry, cut off any visible fat.   Use less fat and oil.  Try baking foods instead of frying them. Add less fat, such as margarine, sour cream, regular salad dressing and mayonnaise to foods. Eat fewer high-fat foods. Some examples of high-fat foods include french fries, doughnuts, ice cream, and cakes.  Eat fewer sweets.   Limit foods and drinks that are high in sugar. This includes candy, cookies, regular soda, and sweetened drinks.  Exercise:  Exercise at least 30 minutes per day on most days of the week. Some examples of exercise include walking, biking, dancing, and swimming. You can also fit in more physical activity by taking the stairs instead of the elevator or parking farther away from stores. Ask your healthcare provider about the best exercise plan for you.      © Copyright [employer] 2018 Information is for End User's use only and may not be sold, redistributed or otherwise used for commercial purposes. All illustrations and images included in CareNotes® are the copyrighted property of [employer] or [employer]

## 2025-03-13 NOTE — PROGRESS NOTES
Name: Jake Perez      : 1950      MRN: [id number]  Encounter Provider: Bon Summers MD  Encounter Date: 3/13/2025   Encounter department: [facility]    Assessment & Plan  Medicare annual wellness visit, subsequent         Persistent atrial fibrillation (HCC)  stable         Primary hypertension  Stable on current medication regimen. Patient to continue prescribed medication.            Malignant melanoma of skin (HCC)  Followed  derm         Primary insomnia    Orders:    zolpidem (AMBIEN) 5 mg tablet; Take 1 tablet (5 mg total) by mouth daily at bedtime as needed for sleep       Preventive health issues were discussed with patient, and age appropriate screening tests were ordered as noted in patient's After Visit Summary. Personalized health advice and appropriate referrals for health education or preventive services given if needed, as noted in patient's After Visit Summary.    History of Present Illness     HPI   Patient Care Team:  Bon Summers MD as PCP - General  MD Epifanio Scherer MD Jerome Burke, MD (Gastroenterology)    Review of Systems   Constitutional:  Negative for fatigue, fever and unexpected weight change.   HENT:  Negative for congestion, sinus pain and sore throat.    Eyes:  Negative for visual disturbance.   Respiratory:  Negative for shortness of breath and wheezing.    Cardiovascular:  Negative for chest pain and palpitations.   Gastrointestinal:  Negative for abdominal pain, nausea and vomiting.   Musculoskeletal: Negative.  Negative for arthralgias and myalgias.   Neurological:  Negative for syncope, weakness and numbness.   Psychiatric/Behavioral: Negative.  Negative for confusion, dysphoric mood and suicidal ideas.      Medical History Reviewed by provider this encounter:  Tobacco  Allergies  Meds  Problems  Med Hx  Surg Hx  Fam Hx       Annual Wellness Visit Questionnaire   Last Medicare Wellness visit  information reviewed, patient interviewed and updates made to the record today.      Health Risk Assessment:   Patient rates overall health as very good. Patient feels that their physical health rating is same. Patient is very satisfied with their life. Eyesight was rated as same. Hearing was rated as same. Patient feels that their emotional and mental health rating is same. Patients states they are sometimes angry. Patient states they are sometimes unusually tired/fatigued. Pain experienced in the last 7 days has been none. Patient states that he has experienced no weight loss or gain in last 6 months.     Depression Screening:   PHQ-2 Score: 0      Fall Risk Screening:   In the past year, patient has experienced: no history of falling in past year      Home Safety:  Patient does not have trouble with stairs inside or outside of their home. Patient has working smoke alarms and has no working carbon monoxide detector. Home safety hazards include: none.     Nutrition:   Current diet is Limited junk food.     Medications:   Patient is currently taking over-the-counter supplements. OTC medications include: see medication list. Patient is able to manage medications.     Activities of Daily Living (ADLs)/Instrumental Activities of Daily Living (IADLs):   Walk and transfer into and out of bed and chair?: Yes  Dress and groom yourself?: Yes    Bathe or shower yourself?: Yes    Feed yourself? Yes  Do your laundry/housekeeping?: Yes  Manage your money, pay your bills and track your expenses?: Yes  Make your own meals?: Yes    Do your own shopping?: Yes    Durable Medical Equipment Suppliers  none    Previous Hospitalizations:   Any hospitalizations or ED visits within the last 12 months?: No      Advance Care Planning:   Living will: Yes    Durable POA for healthcare: Yes    Advanced directive: Yes    Provider agrees with end of life decisions: Yes      Cognitive Screening:   Provider or family/friend/caregiver concerned  regarding cognition?: No    PREVENTIVE SCREENINGS      Cardiovascular Screening:    General: Screening Not Indicated and History Lipid Disorder      Diabetes Screening:     General: Screening Current      Colorectal Cancer Screening:     General: Screening Current      Prostate Cancer Screening:    General: Screening Not Indicated      Osteoporosis Screening:    General: Screening Not Indicated      Abdominal Aortic Aneurysm (AAA) Screening:        General: Screening Not Indicated      Lung Cancer Screening:     General: Screening Not Indicated      Hepatitis C Screening:    General: Screening Not Indicated    Screening, Brief Intervention, and Referral to Treatment (SBIRT)     Screening  Typical number of drinks in a day: 0  Typical number of drinks in a week: 0  Interpretation: Low risk drinking behavior.    AUDIT-C Screenin) How often did you have a drink containing alcohol in the past year? never  2) How many drinks did you have on a typical day when you were drinking in the past year? 0  3) How often did you have 6 or more drinks on one occasion in the past year? never    AUDIT-C Score: 0  Interpretation: Score 0-3 (male): Negative screen for alcohol misuse    Single Item Drug Screening:  How often have you used an illegal drug (including marijuana) or a prescription medication for non-medical reasons in the past year? never    Single Item Drug Screen Score: 0  Interpretation: Negative screen for possible drug use disorder    Social Drivers of Health     Financial Resource Strain: Low Risk  (3/7/2024)    Overall Financial Resource Strain (CARDIA)     Difficulty of Paying Living Expenses: Not hard at all   Food Insecurity: No Food Insecurity (3/10/2025)    Hunger Vital Sign     Worried About Running Out of Food in the Last Year: Never true     Ran Out of Food in the Last Year: Never true   Transportation Needs: No Transportation Needs (3/10/2025)    PRAPARE - Transportation     Lack of Transportation  "(Medical): No     Lack of Transportation (Non-Medical): No   Housing Stability: Low Risk  (3/10/2025)    Housing Stability Vital Sign     Unable to Pay for Housing in the Last Year: No     Number of Times Moved in the Last Year: 0     Homeless in the Last Year: No   Utilities: Not At Risk (3/10/2025)    [facility] Utilities     Threatened with loss of utilities: No     No results found.    Objective   /80 (BP Location: Left arm, Patient Position: Sitting, Cuff Size: Standard)   Pulse 94   Temp 97.7 °F (36.5 °C) (Tympanic)   Ht 6' 1\" (1.854 m)   Wt 92.3 kg (203 lb 6.4 oz)   SpO2 96%   BMI 26.84 kg/m²     Physical Exam  Vitals and nursing note reviewed.   Constitutional:       General: He is not in acute distress.     Appearance: He is well-developed.   HENT:      Head: Normocephalic and atraumatic.   Eyes:      Conjunctiva/sclera: Conjunctivae normal.   Cardiovascular:      Rate and Rhythm: Normal rate and regular rhythm.      Heart sounds: No murmur heard.  Pulmonary:      Effort: Pulmonary effort is normal. No respiratory distress.      Breath sounds: Normal breath sounds.   Abdominal:      Palpations: Abdomen is soft.      Tenderness: There is no abdominal tenderness.   Musculoskeletal:         General: No swelling.      Cervical back: Neck supple.   Skin:     General: Skin is warm and dry.      Capillary Refill: Capillary refill takes less than 2 seconds.   Neurological:      Mental Status: He is alert.   Psychiatric:         Mood and Affect: Mood normal.         "

## 2025-03-19 DIAGNOSIS — K04.7 TOOTH INFECTION: Primary | ICD-10-CM

## 2025-03-19 RX ORDER — AMOXICILLIN 500 MG/1
CAPSULE ORAL
Qty: 30 CAPSULE | Refills: 0 | Status: SHIPPED | OUTPATIENT
Start: 2025-03-19 | End: 2025-03-29

## 2025-08-16 DIAGNOSIS — K04.7 TOOTH INFECTION: ICD-10-CM

## 2025-08-20 RX ORDER — AMOXICILLIN 500 MG/1
CAPSULE ORAL
Qty: 30 CAPSULE | Refills: 0 | Status: SHIPPED | OUTPATIENT
Start: 2025-08-20 | End: 2025-08-30

## (undated) DEVICE — REM POLYHESIVE ADULT PATIENT RETURN ELECTRODE: Brand: VALLEYLAB

## (undated) DEVICE — 2000CC GUARDIAN II: Brand: GUARDIAN

## (undated) DEVICE — VIAL DECANTER

## (undated) DEVICE — GLOVE INDICATOR PI UNDERGLOVE SZ 6.5 BLUE

## (undated) DEVICE — 3M™ STERI-STRIP™ COMPOUND BENZOIN TINCTURE 40 BAGS/CARTON 4 CARTONS/CASE C1544: Brand: 3M™ STERI-STRIP™

## (undated) DEVICE — PENCIL ELECTROSURG E-Z CLEAN -0035H

## (undated) DEVICE — GLOVE SRG BIOGEL 7

## (undated) DEVICE — INTENDED FOR TISSUE SEPARATION, AND OTHER PROCEDURES THAT REQUIRE A SHARP SURGICAL BLADE TO PUNCTURE OR CUT.: Brand: BARD-PARKER SAFETY BLADES SIZE 11, STERILE

## (undated) DEVICE — SYRINGE CATH TIP 50ML

## (undated) DEVICE — GLOVE SRG BIOGEL ECLIPSE 8

## (undated) DEVICE — ADHESIVE SKIN HIGH VISCOSITY EXOFIN 1ML

## (undated) DEVICE — 3M™ STERI-STRIP™ REINFORCED ADHESIVE SKIN CLOSURES, R1547, 1/2 IN X 4 IN (12 MM X 100 MM), 6 STRIPS/ENVELOPE: Brand: 3M™ STERI-STRIP™

## (undated) DEVICE — TROCARS: Brand: KII® BALLOON BLUNT TIP SYSTEM

## (undated) DEVICE — ABSORBABLE WOUND CLOSURE DEVICE: Brand: V-LOC 90

## (undated) DEVICE — DRAPE EQUIPMENT RF WAND

## (undated) DEVICE — CHLORAPREP HI-LITE 26ML ORANGE

## (undated) DEVICE — TRAY FOLEY 16FR URIMETER SURESTEP

## (undated) DEVICE — ENDOPATH 5MM ENDOSCOPIC BLUNT TIP DISSECTORS (12 POUCHES CONTAINING 3 DISSECTORS EACH): Brand: ENDOPATH

## (undated) DEVICE — SCD SEQUENTIAL COMPRESSION COMFORT SLEEVE MEDIUM KNEE LENGTH: Brand: KENDALL SCD

## (undated) DEVICE — PAD GROUNDING ADULT

## (undated) DEVICE — SUT SILK 5-0 P-3 18 IN 640G

## (undated) DEVICE — NEEDLE 27 G X 1 1/4

## (undated) DEVICE — SUT MONOCRYL 4-0 PS-2 27 IN Y426H

## (undated) DEVICE — GLOVE SRG BIOGEL 6.5

## (undated) DEVICE — TIBURON SPLIT SHEET: Brand: CONVERTORS

## (undated) DEVICE — INTENDED FOR TISSUE SEPARATION, AND OTHER PROCEDURES THAT REQUIRE A SHARP SURGICAL BLADE TO PUNCTURE OR CUT.: Brand: BARD-PARKER SAFETY BLADES SIZE 15, STERILE

## (undated) DEVICE — STRL UNIVERSAL OUTPATIENT PACK: Brand: CARDINAL HEALTH

## (undated) DEVICE — GLOVE INDICATOR PI UNDERGLOVE SZ 8 BLUE

## (undated) DEVICE — SUT PROLENE 2-0 CT-2 30 IN 8411H

## (undated) DEVICE — STAPLER ENDO HERNIA 4.0 X 12MM

## (undated) DEVICE — TROCAR: Brand: KII® SLEEVE

## (undated) DEVICE — TROCAR: Brand: KII FIOS FIRST ENTRY

## (undated) DEVICE — TUBING SUCTION 5MM X 12 FT

## (undated) DEVICE — SKIN MARKER DUAL TIP WITH RULER CAP, FLEXIBLE RULER AND LABELS: Brand: DEVON

## (undated) DEVICE — NEEDLE 25G X 1 1/2

## (undated) DEVICE — VESSEL CANNULA

## (undated) DEVICE — ENDOPATH 5MM CURVED SCISSORS WITH MONOPOLAR CAUTERY: Brand: ENDOPATH

## (undated) DEVICE — SYRINGE 10ML LL

## (undated) DEVICE — ELECTRODE BLADE MOD E-Z CLEAN 2.5IN 6.4CM -0012M

## (undated) DEVICE — SUT VICRYL 0 UR-6 27 IN J603H

## (undated) DEVICE — BLUE HEAT SCOPE WARMER

## (undated) DEVICE — ALLENTOWN LAP CHOLE APP PACK: Brand: CARDINAL HEALTH

## (undated) DEVICE — UNDYED MONOFILAMENT (POLYDIOXANONE), ABSORBABLE SYNTHETIC SURGICAL SUTURE: Brand: PDS

## (undated) DEVICE — 10FR FRAZIER SUCTION HANDLE: Brand: CARDINAL HEALTH

## (undated) DEVICE — MICRODISSECTION NEEDLE STRAIGHT SLEEVE: Brand: COLORADO